# Patient Record
Sex: FEMALE | Race: WHITE | Employment: FULL TIME | URBAN - METROPOLITAN AREA
[De-identification: names, ages, dates, MRNs, and addresses within clinical notes are randomized per-mention and may not be internally consistent; named-entity substitution may affect disease eponyms.]

---

## 2017-01-04 DIAGNOSIS — G43.109 MIGRAINE WITH AURA AND WITHOUT STATUS MIGRAINOSUS, NOT INTRACTABLE: Primary | ICD-10-CM

## 2017-01-04 RX ORDER — SUMATRIPTAN 50 MG/1
50 TABLET, FILM COATED ORAL
Qty: 18 TABLET | Refills: 1 | Status: SHIPPED | OUTPATIENT
Start: 2017-01-04 | End: 2018-10-29

## 2017-01-04 NOTE — TELEPHONE ENCOUNTER
Received refill request for patients sumatriptan. Last seen in clinic 7/2016 with Dr. Colon and plan was to continue this med for migraines. Refill sent per  RN protocol.

## 2017-01-25 ENCOUNTER — RADIANT APPOINTMENT (OUTPATIENT)
Dept: MAMMOGRAPHY | Facility: CLINIC | Age: 47
End: 2017-01-25
Attending: OBSTETRICS & GYNECOLOGY

## 2017-01-25 DIAGNOSIS — Z12.39 BREAST CANCER SCREENING: ICD-10-CM

## 2017-05-24 DIAGNOSIS — I10 ESSENTIAL HYPERTENSION, BENIGN: ICD-10-CM

## 2017-05-25 RX ORDER — ENALAPRIL MALEATE 20 MG/1
TABLET ORAL
Qty: 30 TABLET | Refills: 2 | Status: SHIPPED | OUTPATIENT
Start: 2017-05-25 | End: 2017-08-25

## 2017-05-25 NOTE — TELEPHONE ENCOUNTER
Received refill for enalapril (VASOTEC) 20 MG tablet. Able to temporarily fill per protocol but patient needs to be seen for more refills. Note sent to pharmacy.

## 2017-08-23 DIAGNOSIS — I10 ESSENTIAL HYPERTENSION, BENIGN: ICD-10-CM

## 2017-08-23 NOTE — TELEPHONE ENCOUNTER
Received refill request for enalapril. Patient has clinic appointment in two days on 8/25.     Tried to reach Adriana but received voicemail.  Left message to call back and advise if she has enough medicine to wait until her appointment or if she needs a refill now.

## 2017-08-24 RX ORDER — ENALAPRIL MALEATE 20 MG/1
TABLET ORAL
Qty: 30 TABLET | Refills: 1 | OUTPATIENT
Start: 2017-08-24

## 2017-08-25 ENCOUNTER — OFFICE VISIT (OUTPATIENT)
Dept: INTERNAL MEDICINE | Facility: CLINIC | Age: 47
End: 2017-08-25
Attending: INTERNAL MEDICINE
Payer: COMMERCIAL

## 2017-08-25 VITALS
SYSTOLIC BLOOD PRESSURE: 106 MMHG | WEIGHT: 232.7 LBS | BODY MASS INDEX: 36.45 KG/M2 | DIASTOLIC BLOOD PRESSURE: 74 MMHG | HEART RATE: 78 BPM

## 2017-08-25 DIAGNOSIS — N39.41 URGE INCONTINENCE OF URINE: ICD-10-CM

## 2017-08-25 DIAGNOSIS — G43.109 MIGRAINE WITH AURA AND WITHOUT STATUS MIGRAINOSUS, NOT INTRACTABLE: ICD-10-CM

## 2017-08-25 DIAGNOSIS — I10 ESSENTIAL HYPERTENSION, BENIGN: ICD-10-CM

## 2017-08-25 DIAGNOSIS — Z00.00 ROUTINE GENERAL MEDICAL EXAMINATION AT A HEALTH CARE FACILITY: Primary | ICD-10-CM

## 2017-08-25 LAB
ANION GAP SERPL CALCULATED.3IONS-SCNC: 9 MMOL/L (ref 3–14)
BUN SERPL-MCNC: 14 MG/DL (ref 7–30)
CALCIUM SERPL-MCNC: 8.6 MG/DL (ref 8.5–10.1)
CHLORIDE SERPL-SCNC: 109 MMOL/L (ref 94–109)
CHOLEST SERPL-MCNC: 292 MG/DL
CO2 SERPL-SCNC: 26 MMOL/L (ref 20–32)
CREAT SERPL-MCNC: 0.69 MG/DL (ref 0.52–1.04)
GFR SERPL CREATININE-BSD FRML MDRD: >90 ML/MIN/1.7M2
GLUCOSE SERPL-MCNC: 93 MG/DL (ref 70–99)
HDLC SERPL-MCNC: 57 MG/DL
LDLC SERPL CALC-MCNC: 212 MG/DL
NONHDLC SERPL-MCNC: 235 MG/DL
POTASSIUM SERPL-SCNC: 4.1 MMOL/L (ref 3.4–5.3)
SODIUM SERPL-SCNC: 144 MMOL/L (ref 133–144)
TRIGL SERPL-MCNC: 116 MG/DL
TSH SERPL DL<=0.005 MIU/L-ACNC: 2.04 MU/L (ref 0.4–4)

## 2017-08-25 PROCEDURE — 99213 OFFICE O/P EST LOW 20 MIN: CPT | Mod: ZF

## 2017-08-25 PROCEDURE — 84443 ASSAY THYROID STIM HORMONE: CPT | Performed by: INTERNAL MEDICINE

## 2017-08-25 PROCEDURE — 36415 COLL VENOUS BLD VENIPUNCTURE: CPT | Performed by: INTERNAL MEDICINE

## 2017-08-25 PROCEDURE — 80061 LIPID PANEL: CPT | Performed by: INTERNAL MEDICINE

## 2017-08-25 PROCEDURE — 80048 BASIC METABOLIC PNL TOTAL CA: CPT | Performed by: INTERNAL MEDICINE

## 2017-08-25 RX ORDER — ENALAPRIL MALEATE 20 MG/1
TABLET ORAL
Qty: 90 TABLET | Refills: 3 | Status: SHIPPED | OUTPATIENT
Start: 2017-08-25 | End: 2018-08-10

## 2017-08-25 ASSESSMENT — ANXIETY QUESTIONNAIRES
2. NOT BEING ABLE TO STOP OR CONTROL WORRYING: NOT AT ALL
1. FEELING NERVOUS, ANXIOUS, OR ON EDGE: NOT AT ALL
5. BEING SO RESTLESS THAT IT IS HARD TO SIT STILL: NOT AT ALL
GAD7 TOTAL SCORE: 1
6. BECOMING EASILY ANNOYED OR IRRITABLE: NOT AT ALL
7. FEELING AFRAID AS IF SOMETHING AWFUL MIGHT HAPPEN: SEVERAL DAYS
3. WORRYING TOO MUCH ABOUT DIFFERENT THINGS: NOT AT ALL

## 2017-08-25 ASSESSMENT — ENCOUNTER SYMPTOMS
DIZZINESS: 0
DIARRHEA: 0
POLYPHAGIA: 0
HEARTBURN: 0
NUMBNESS: 0
HALLUCINATIONS: 0
NAUSEA: 0
ALTERED TEMPERATURE REGULATION: 0
JAUNDICE: 0
TINGLING: 0
DECREASED APPETITE: 0
CONSTIPATION: 1
FATIGUE: 1
BLOOD IN STOOL: 0
POLYDIPSIA: 0
MEMORY LOSS: 0
WEIGHT LOSS: 0
CHILLS: 0
HEMATURIA: 0
RECTAL BLEEDING: 0
INCREASED ENERGY: 1
FEVER: 0
VOMITING: 0
WEAKNESS: 0
FLANK PAIN: 0
SEIZURES: 0
DISTURBANCES IN COORDINATION: 0
DYSURIA: 0
TREMORS: 0
HEADACHES: 1
DIFFICULTY URINATING: 0
WEIGHT GAIN: 1
BLOATING: 1
PARALYSIS: 0
SPEECH CHANGE: 0
ABDOMINAL PAIN: 0
RECTAL PAIN: 0
NIGHT SWEATS: 0
BOWEL INCONTINENCE: 0
LOSS OF CONSCIOUSNESS: 0

## 2017-08-25 ASSESSMENT — PATIENT HEALTH QUESTIONNAIRE - PHQ9
SUM OF ALL RESPONSES TO PHQ QUESTIONS 1-9: 2
5. POOR APPETITE OR OVEREATING: NOT AT ALL

## 2017-08-25 NOTE — LETTER
9/26/2017         Adriana Lucia   [address] [facility] [place] [address]        Dear Ms. Lucia:    Your cholesterol was significantly elevated. Recommend starting a medication. Please, schedule a visit to discuss treatment options.     Results for orders placed or performed in visit on 08/25/17   Basic Metabolic Panel   Result Value Ref Range    Sodium 144 133 - 144 mmol/L    Potassium 4.1 3.4 - 5.3 mmol/L    Chloride 109 94 - 109 mmol/L    Carbon Dioxide 26 20 - 32 mmol/L    Anion Gap 9 3 - 14 mmol/L    Glucose 93 70 - 99 mg/dL    Urea Nitrogen 14 7 - 30 mg/dL    Creatinine 0.69 0.52 - 1.04 mg/dL    GFR Estimate >90 >60 mL/min/1.7m2    GFR Estimate If Black >90 >60 mL/min/1.7m2    Calcium 8.6 8.5 - 10.1 mg/dL   Lipid Profile   Result Value Ref Range    Cholesterol 292 (H) <200 mg/dL    Triglycerides 116 <150 mg/dL    HDL Cholesterol 57 >49 mg/dL    LDL Cholesterol Calculated 212 (H) <100 mg/dL    Non HDL Cholesterol 235 (H) <130 mg/dL   TSH with free T4 reflex   Result Value Ref Range    TSH 2.04 0.40 - 4.00 mU/L         Please note that test explanations are brief and do not reflect all diagnostic uses.  If you have any questions or concerns, please call the clinic at [phone].      Sincerely,      Crissy Dozier sent on behalf of  Nadira Colon MD

## 2017-08-25 NOTE — LETTER
8/25/2017        RE: Adriana Lucia  [address] [facility] [place] [address]     Dear Colleague,    Thank you for referring your patient, Adirana Lucia, to the [facility]  at [facility]. Please see a copy of my visit note below.       SUBJECTIVE:   CC: Adriana Lucia is an 46 year old woman who presents for preventive health visit.     Healthy Habits:    Do you get at least three servings of calcium containing foods daily (dairy, green leafy vegetables, etc.)? yes    Amount of exercise or daily activities, outside of work: no regular exercise    Problems taking medications regularly No    Medication side effects: No    Have you had an eye exam in the past two years? yes    Do you see a dentist twice per year? no    Do you have sleep apnea, excessive snoring or daytime drowsiness?no          -------------------------------------    Today's PHQ-2 Score: PHQ-2 ( 1999 [employer]) 8/25/2017 8/8/2016   Q1: Little interest or pleasure in doing things 0 0   Q2: Feeling down, depressed or hopeless 0 0   PHQ-2 Score 0 0   Q1: Little interest or pleasure in doing things Not at all -   Q2: Feeling down, depressed or hopeless Not at all -   PHQ-2 Score 0 -       Abuse: Current or Past(Physical, Sexual or Emotional)- No  Do you feel safe in your environment - Yes  Social History   Substance Use Topics     Smoking status: Never Smoker     Smokeless tobacco: Former User     Quit date: 8/8/2014     Alcohol use 0.0 oz/week     The patient does not drink >3 drinks per day nor >7 drinks per week.    Reviewed orders with patient.  Reviewed health maintenance and updated orders accordingly - Yes  Labs reviewed in EPIC    Patient under age 50, mutual decision reflected in health maintenance.    Pertinent mammograms are reviewed under the imaging tab.  History of abnormal Pap smear: NO - age 30-65 PAP every 5 years with negative HPV co-testing  recommended    Reviewed and updated as needed this visit by clinical staffTobacco  Allergies  Meds         Reviewed and updated as needed this visit by Provider        Past Medical History:   Diagnosis Date     HSV (herpes simplex virus) infection      Hypertension       Past Surgical History:   Procedure Laterality Date      SECTION N/A 2015    Procedure:  SECTION;  Surgeon: Meredith Clark MD;  Location:  L+D     wisdom teeth             ROS:  Review of Systems     Constitutional:  Positive for weight gain, fatigue, recent stressors and increased energy. Negative for fever, chills, weight loss, decreased appetite, night sweats, height loss, post-operative complications, incisional pain, hallucinations, hyperactivity and confused.   HENT:  Negative for ear pain, hearing loss, tinnitus, nosebleeds, trouble swallowing, hoarse voice, mouth sores, sore throat, ear discharge, tooth pain, gum tenderness, taste disturbance, smell disturbance, hearing aid, bleeding gums, dry mouth, sinus pain, sinus congestion and neck mass.    Eyes:  Negative for double vision, pain, redness, eye pain, decreased vision, eye watering, eye bulging, eye dryness, flashing lights, spots, floaters, strabismus, tunnel vision, jaundice and eye irritation.   Respiratory:   Negative for cough, hemoptysis, sputum production, shortness of breath, wheezing, sleep disturbances due to breathing, snores loudly, respiratory pain, dyspnea on exertion, cough disturbing sleep and postural dyspnea.    Cardiovascular:  Negative for chest pain, dyspnea on exertion, palpitations, orthopnea, claudication, leg swelling, fingers/toes turn blue, hypertension, hypotension, syncope, history of heart murmur, chest pain on exertion, chest pain at rest, pacemaker, few scattered varicosities, leg pain, sleep disturbances due to breathing, tachycardia, light-headedness, exercise intolerance and edema.   Gastrointestinal:   Positive for constipation and bloating. Negative for heartburn, nausea, vomiting, abdominal pain, diarrhea, blood in stool, melena, rectal pain, hemorrhoids, bowel incontinence, jaundice, rectal bleeding, coffee ground emesis and change in stool.   Genitourinary:  Positive for bladder incontinence and nocturia. Negative for dysuria, urgency, hematuria, flank pain, vaginal discharge, difficulty urinating, genital sores, dyspareunia, decreased libido, voiding less frequently, arousal difficulty, abnormal vaginal bleeding, excessive menstruation, menstrual changes, hot flashes, vaginal dryness and postmenopausal bleeding.   Musculoskeletal:  Negative for myalgias, back pain, joint swelling, arthralgias, stiffness, muscle cramps, neck pain, bone pain, muscle weakness and fracture.   Skin:  Negative for nail changes, itching, poor wound healing, rash, hair changes, skin changes, acne, warts, poor wound healing, scarring, flaky skin, Raynaud's phenomenon, sensitivity to sunlight and skin thickening.   Neurological:  Positive for headaches. Negative for dizziness, tingling, tremors, speech change, seizures, loss of consciousness, weakness, light-headedness, numbness, disturbances in coordination, memory loss, difficulty walking and paralysis.   Endo/Heme:  Negative for anemia, swollen glands and bruises/bleeds easily.   Psychiatric/Behavioral:  Negative for depression, hallucinations, memory loss, decreased concentration, mood swings and panic attacks.    Breast:  Negative for breast discharge, breast mass, breast pain and nipple retraction.   Endocrine:  Negative for altered temperature regulation, polyphagia, polydipsia, unwanted hair growth and change in facial hair.        OBJECTIVE:   /74  Pulse 78  Wt 105.6 kg (232 lb 11.2 oz)  LMP 08/20/2017  Breastfeeding? No  BMI 36.45 kg/m2  EXAM:  GENERAL: healthy, alert and no distress  EYES: Eyes grossly normal to inspection  HENT: normal cephalic/atraumatic, nose  "and mouth without ulcers or lesions, oropharynx clear and oral mucous membranes moist  NECK: no asymmetry, masses, or scars  RESP: lungs clear to auscultation - no rales, rhonchi or wheezes  CV: regular rate and rhythm, normal S1 S2, no S3 or S4, no murmur, click or rub, no peripheral edema and peripheral pulses strong  ABDOMEN: soft, nontender, no hepatosplenomegaly, no masses and bowel sounds normal  MS: no gross musculoskeletal defects noted, no edema  SKIN: no suspicious lesions or rashes  NEURO: Normal strength and tone, mentation intact and speech normal  PSYCH: mentation appears normal, affect normal/bright    ASSESSMENT/PLAN:   1. Routine general medical examination at a health care facility  Patient is up to date on PAP smear. Discussed screening for hyperlipidemia and diabetes. Patient will be advised on test results accordingly.   - Basic Metabolic Panel  - Lipid Profile  - TSH with free T4 reflex    2. Migraine with aura and without status migrainosus, not intractable  Patient will be monitoring headaches at this time. Will conisder further evaluation of headaches are bocoming more frequent and more severe.     3. Essential hypertension, benign  Blood pressure is at goal. Recommend to continue with current medical therapy without changes.  - enalapril (VASOTEC) 20 MG tablet; TAKE 1 TABLET (20 MG) BY MOUTH DAILY  Dispense: 90 tablet; Refill: 3    4. Urge incontinence of urine  Patient is interested in further evaluation, referral to Urology was placed.   - UROLOGY ADULT REFERRAL    COUNSELING:   Reviewed preventive health counseling, as reflected in patient instructions       Regular exercise       Healthy diet/nutrition       Vision screening   reports that she has never smoked. She quit smokeless tobacco use about 3 years ago.    Estimated body mass index is 36.45 kg/(m^2) as calculated from the following:    Height as of 8/8/16: 1.702 m (5' 7\").    Weight as of this encounter: 105.6 kg (232 lb 11.2 " oz).     Nadira Colon MD  [facility]

## 2017-08-25 NOTE — PROGRESS NOTES
SUBJECTIVE:   CC: Adriana Luica is an 46 year old woman who presents for preventive health visit.     Healthy Habits:    Do you get at least three servings of calcium containing foods daily (dairy, green leafy vegetables, etc.)? yes    Amount of exercise or daily activities, outside of work: no regular exercise    Problems taking medications regularly No    Medication side effects: No    Have you had an eye exam in the past two years? yes    Do you see a dentist twice per year? no    Do you have sleep apnea, excessive snoring or daytime drowsiness?no          -------------------------------------    Today's PHQ-2 Score: PHQ-2 ( 1999 Pf[employer]) 8/25/2017 8/8/2016   Q1: Little interest or pleasure in doing things 0 0   Q2: Feeling down, depressed or hopeless 0 0   PHQ-2 Score 0 0   Q1: Little interest or pleasure in doing things Not at all -   Q2: Feeling down, depressed or hopeless Not at all -   PHQ-2 Score 0 -         Abuse: Current or Past(Physical, Sexual or Emotional)- No  Do you feel safe in your environment - Yes  Social History   Substance Use Topics     Smoking status: Never Smoker     Smokeless tobacco: Former User     Quit date: 8/8/2014     Alcohol use 0.0 oz/week     The patient does not drink >3 drinks per day nor >7 drinks per week.    Reviewed orders with patient.  Reviewed health maintenance and updated orders accordingly - Yes  Labs reviewed in EPIC    Patient under age 50, mutual decision reflected in health maintenance.        Pertinent mammograms are reviewed under the imaging tab.  History of abnormal Pap smear: NO - age 30-65 PAP every 5 years with negative HPV co-testing recommended    Reviewed and updated as needed this visit by clinical staffTobacco  Allergies  Meds         Reviewed and updated as needed this visit by Provider        Past Medical History:   Diagnosis Date     HSV (herpes simplex virus) infection      Hypertension       Past Surgical History:   Procedure Laterality  Date      SECTION N/A 2015    Procedure:  SECTION;  Surgeon: Aparna Camarillo, Meredith Chisholm MD;  Location: [facility] L+[facility]     wisdom teeth      -       ROS:  Review of Systems     Constitutional:  Positive for weight gain, fatigue, recent stressors and increased energy. Negative for fever, chills, weight loss, decreased appetite, night sweats, height loss, post-operative complications, incisional pain, hallucinations, hyperactivity and confused.   HENT:  Negative for ear pain, hearing loss, tinnitus, nosebleeds, trouble swallowing, hoarse voice, mouth sores, sore throat, ear discharge, tooth pain, gum tenderness, taste disturbance, smell disturbance, hearing aid, bleeding gums, dry mouth, sinus pain, sinus congestion and neck mass.    Eyes:  Negative for double vision, pain, redness, eye pain, decreased vision, eye watering, eye bulging, eye dryness, flashing lights, spots, floaters, strabismus, tunnel vision, jaundice and eye irritation.   Respiratory:   Negative for cough, hemoptysis, sputum production, shortness of breath, wheezing, sleep disturbances due to breathing, snores loudly, respiratory pain, dyspnea on exertion, cough disturbing sleep and postural dyspnea.    Cardiovascular:  Negative for chest pain, dyspnea on exertion, palpitations, orthopnea, claudication, leg swelling, fingers/toes turn blue, hypertension, hypotension, syncope, history of heart murmur, chest pain on exertion, chest pain at rest, pacemaker, few scattered varicosities, leg pain, sleep disturbances due to breathing, tachycardia, light-headedness, exercise intolerance and edema.   Gastrointestinal:  Positive for constipation and bloating. Negative for heartburn, nausea, vomiting, abdominal pain, diarrhea, blood in stool, melena, rectal pain, hemorrhoids, bowel incontinence, jaundice, rectal bleeding, coffee ground emesis and change in stool.   Genitourinary:  Positive for bladder incontinence and nocturia. Negative for  dysuria, urgency, hematuria, flank pain, vaginal discharge, difficulty urinating, genital sores, dyspareunia, decreased libido, voiding less frequently, arousal difficulty, abnormal vaginal bleeding, excessive menstruation, menstrual changes, hot flashes, vaginal dryness and postmenopausal bleeding.   Musculoskeletal:  Negative for myalgias, back pain, joint swelling, arthralgias, stiffness, muscle cramps, neck pain, bone pain, muscle weakness and fracture.   Skin:  Negative for nail changes, itching, poor wound healing, rash, hair changes, skin changes, acne, warts, poor wound healing, scarring, flaky skin, Raynaud's phenomenon, sensitivity to sunlight and skin thickening.   Neurological:  Positive for headaches. Negative for dizziness, tingling, tremors, speech change, seizures, loss of consciousness, weakness, light-headedness, numbness, disturbances in coordination, memory loss, difficulty walking and paralysis.   Endo/Heme:  Negative for anemia, swollen glands and bruises/bleeds easily.   Psychiatric/Behavioral:  Negative for depression, hallucinations, memory loss, decreased concentration, mood swings and panic attacks.    Breast:  Negative for breast discharge, breast mass, breast pain and nipple retraction.   Endocrine:  Negative for altered temperature regulation, polyphagia, polydipsia, unwanted hair growth and change in facial hair.        OBJECTIVE:   /74  Pulse 78  Wt 105.6 kg (232 lb 11.2 oz)  LMP 08/20/2017  Breastfeeding? No  BMI 36.45 kg/m2  EXAM:  GENERAL: healthy, alert and no distress  EYES: Eyes grossly normal to inspection  HENT: normal cephalic/atraumatic, nose and mouth without ulcers or lesions, oropharynx clear and oral mucous membranes moist  NECK: no asymmetry, masses, or scars  RESP: lungs clear to auscultation - no rales, rhonchi or wheezes  CV: regular rate and rhythm, normal S1 S2, no S3 or S4, no murmur, click or rub, no peripheral edema and peripheral pulses  "strong  ABDOMEN: soft, nontender, no hepatosplenomegaly, no masses and bowel sounds normal  MS: no gross musculoskeletal defects noted, no edema  SKIN: no suspicious lesions or rashes  NEURO: Normal strength and tone, mentation intact and speech normal  PSYCH: mentation appears normal, affect normal/bright    ASSESSMENT/PLAN:   1. Routine general medical examination at a health care facility  Patient is up to date on PAP smear. Discussed screening for hyperlipidemia and diabetes. Patient will be advised on test results accordingly.   - Basic Metabolic Panel  - Lipid Profile  - TSH with free T4 reflex    2. Migraine with aura and without status migrainosus, not intractable  Patient will be monitoring headaches at this time. Will conisder further evaluation of headaches are bocoming more frequent and more severe.     3. Essential hypertension, benign  Blood pressure is at goal. Recommend to continue with current medical therapy without changes.  - enalapril (VASOTEC) 20 MG tablet; TAKE 1 TABLET (20 MG) BY MOUTH DAILY  Dispense: 90 tablet; Refill: 3    4. Urge incontinence of urine  Patient is interested in further evaluation, referral to Urology was placed.   - UROLOGY ADULT REFERRAL    COUNSELING:   Reviewed preventive health counseling, as reflected in patient instructions       Regular exercise       Healthy diet/nutrition       Vision screening         reports that she has never smoked. She quit smokeless tobacco use about 3 years ago.    Estimated body mass index is 36.45 kg/(m^2) as calculated from the following:    Height as of 8/8/16: 1.702 m (5' 7\").    Weight as of this encounter: 105.6 kg (232 lb 11.2 oz).         Counseling Resources:  ATP IV Guidelines  Pooled Cohorts Equation Calculator  Breast Cancer Risk Calculator  FRAX Risk Assessment  ICSI Preventive Guidelines  Dietary Guidelines for Americans, 2010  [employer] MyPlate  ASA Prophylaxis  Lung CA Screening    Nadira Colon MD  [facility]

## 2017-08-25 NOTE — MR AVS SNAPSHOT
After Visit Summary   8/25/2017    Adriana Lucia    MRN: [id number]           Patient Information     Date Of Birth          1970        Visit Information        Provider Department      8/25/2017 10:20 AM Nadira Colon MD [facility]         Today's Diagnoses     Routine general medical examination at a health care facility    -  1    Migraine with aura and without status migrainosus, not intractable        Essential hypertension, benign        Urge incontinence of urine          Care Instructions      Preventive Health Recommendations  Female Ages 40 to 49    Yearly exam:     See your health care provider every year in order to  1. Review health changes.   2. Discuss preventive care.    3. Review your medicines if your doctor prescribed any.      Get a Pap test every three years (unless you have an abnormal result and your provider advises testing more often).      If you get Pap tests with HPV test, you only need to test every 5 years, unless you have an abnormal result. You do not need a Pap test if your uterus was removed (hysterectomy) and you have not had cancer.      You should be tested each year for STDs (sexually transmitted diseases), if you're at risk.       Ask your doctor if you should have a mammogram.      Have a colonoscopy (test for colon cancer) if someone in your family has had colon cancer or polyps before age 50.       Have a cholesterol test every 5 years.       Have a diabetes test (fasting glucose) after age 45. If you are at risk for diabetes, you should have this test every 3 years.    Shots: Get a flu shot each year. Get a tetanus shot every 10 years.     Nutrition:     Eat at least 5 servings of fruits and vegetables each day.    Eat whole-grain bread, whole-wheat pasta and brown rice instead of white grains and rice.    Talk to your provider about Calcium and Vitamin D.     Lifestyle    Exercise at least 150 minutes a week (an  average of 30 minutes a day, 5 days a week). This will help you control your weight and prevent disease.    Limit alcohol to one drink per day.    No smoking.     Wear sunscreen to prevent skin cancer.    See your dentist every six months for an exam and cleaning.          Follow-ups after your visit        Additional Services     UROLOGY ADULT REFERRAL       Your provider has referred you to: [facility]: [place] [facility] [place] [phone]   [email or web address]    Please be aware that coverage of these services is subject to the terms and limitations of your health insurance plan.  Call member services at your health plan with any benefit or coverage questions.      Please bring the following with you to your appointment:    (1) Any X-Rays, CTs or MRIs which have been performed.  Contact the facility where they were done to arrange for  prior to your scheduled appointment.    (2) List of current medications  (3) This referral request   (4) Any documents/labs given to you for this referral                  Who to contact     Please call your clinic at [phone] to:    Ask questions about your health    Make or cancel appointments    Discuss your medicines    Learn about your test results    Speak to your doctor   If you have compliments or concerns about an experience at your clinic, or if you wish to file a complaint, please contact [facility] Patient Relations at [phone] or email us at [email or web address]         Additional Information About Your Visit        Remotemedicalhart Information     [employer] gives you secure access to your electronic health record. If you see a primary care provider, you can also send messages to your care team and make appointments. If you have questions, please call your primary care clinic.  If you do not have a primary care provider, please call  [phone] and they will assist you.      [employer] is an electronic gateway that provides easy, online access to your medical records. With [employer], you can request a clinic appointment, read your test results, renew a prescription or communicate with your care team.     To access your existing account, please contact your [facility] or call [phone] for assistance.        Care EveryWhere ID     This is your Care EveryWhere ID. This could be used by other organizations to access your [facility] medical records  [id number]        Your Vitals Were     Pulse Last Period Breastfeeding? BMI (Body Mass Index)          78 08/20/2017 No 36.45 kg/m2         Blood Pressure from Last 3 Encounters:   08/25/17 106/74   08/08/16 124/84   07/11/16 103/70    Weight from Last 3 Encounters:   08/25/17 105.6 kg (232 lb 11.2 oz)   08/08/16 99.5 kg (219 lb 4.8 oz)   07/11/16 98.9 kg (218 lb)              We Performed the Following     Basic Metabolic Panel     Lipid Profile     TSH with free T4 reflex     UROLOGY ADULT REFERRAL          Today's Medication Changes          These changes are accurate as of: 8/25/17 11:22 AM.  If you have any questions, ask your nurse or doctor.               These medicines have changed or have updated prescriptions.        Dose/Directions    enalapril 20 MG tablet   Commonly known as:  VASOTEC   This may have changed:  See the new instructions.   Used for:  Essential hypertension, benign   Changed by:  Nadira Colon MD        TAKE 1 TABLET (20 MG) BY MOUTH DAILY   Quantity:  90 tablet   Refills:  3            Where to get your medicines      These medications were sent to [id number] [place] [id number] [address]     Phone:  [phone]     enalapril 20 MG tablet                Primary Care Provider    Physician No Ref-Primary       No address on file        Equal Access to Services     NIA MOTA AH:  Hadii aad ku hadsherriernesto Sorebecaali, wakarisda luqadaha, qaybta kaallizette pacheco, beverly jorgeramon martínezmarci golden. So [facility] [phone].    ATENCIÓN: Si giuseppe rosenbaum, tiene a bolanos disposición servicios gratuitos de asistencia lingüística. Llame al [phone].    We comply with applicable federal civil rights laws and [place] laws. We do not discriminate on the basis of race, color, national origin, age, disability sex, sexual orientation or gender identity.            Thank you!     Thank you for choosing [facility]   for your care. Our goal is always to provide you with excellent care. Hearing back from our patients is one way we can continue to improve our services. Please take a few minutes to complete the written survey that you may receive in the mail after your visit with us. Thank you!             Your Updated Medication List - Protect others around you: Learn how to safely use, store and throw away your medicines at www.disposemymeds.org.          This list is accurate as of: 8/25/17 11:22 AM.  Always use your most recent med list.                   Brand Name Dispense Instructions for use Diagnosis    CALCIUM PO           enalapril 20 MG tablet    VASOTEC    90 tablet    TAKE 1 TABLET (20 MG) BY MOUTH DAILY    Essential hypertension, benign       SUMAtriptan 50 MG tablet    IMITREX    18 tablet    Take 1 tablet (50 mg) by mouth at onset of headache for migraine May repeat dose in 2 hours.  Do not exceed 200 mg in 24 hours    Migraine with aura and without status migrainosus, not intractable       VITAMIN D (CHOLECALCIFEROL) PO      Take 5,000 Units by mouth every other day

## 2017-08-26 ASSESSMENT — ANXIETY QUESTIONNAIRES: GAD7 TOTAL SCORE: 1

## 2017-08-27 ASSESSMENT — ENCOUNTER SYMPTOMS
BOWEL INCONTINENCE: 0
MYALGIAS: 0
NERVOUS/ANXIOUS: 0
FLANK PAIN: 0
LEG SWELLING: 0
WEIGHT GAIN: 1
FEVER: 0
WHEEZING: 0
EYE PAIN: 0
EYE WATERING: 0
BREAST PAIN: 0
DEPRESSION: 0
HEMATURIA: 0
SINUS CONGESTION: 0
COUGH DISTURBING SLEEP: 0
DECREASED CONCENTRATION: 0
MUSCLE WEAKNESS: 0
SPEECH CHANGE: 0
NAUSEA: 0
SNORES LOUDLY: 0
SPUTUM PRODUCTION: 0
JOINT SWELLING: 0
TINGLING: 0
SLEEP DISTURBANCES DUE TO BREATHING: 0
LOSS OF CONSCIOUSNESS: 0
TROUBLE SWALLOWING: 0
TASTE DISTURBANCE: 0
DIFFICULTY URINATING: 0
SMELL DISTURBANCE: 0
CLAUDICATION: 0
POSTURAL DYSPNEA: 0
STIFFNESS: 0
EYE IRRITATION: 0
PALPITATIONS: 0
LIGHT-HEADEDNESS: 0
DECREASED APPETITE: 0
RECTAL BLEEDING: 0
CONSTIPATION: 1
SEIZURES: 0
INSOMNIA: 0
BRUISES/BLEEDS EASILY: 0
BLOATING: 1
HEARTBURN: 0
POOR WOUND HEALING: 0
SWOLLEN GLANDS: 0
TREMORS: 0
HYPERTENSION: 0
JAUNDICE: 0
HOARSE VOICE: 0
NECK MASS: 0
INCREASED ENERGY: 1
NAIL CHANGES: 0
HALLUCINATIONS: 0
DISTURBANCES IN COORDINATION: 0
DIARRHEA: 0
ALTERED TEMPERATURE REGULATION: 0
NIGHT SWEATS: 0
ARTHRALGIAS: 0
PARALYSIS: 0
SYNCOPE: 0
HEMOPTYSIS: 0
CHILLS: 0
POLYDIPSIA: 0
RESPIRATORY PAIN: 0
PANIC: 0
NECK PAIN: 0
HYPOTENSION: 0
DYSPNEA ON EXERTION: 0
EYE REDNESS: 0
TACHYCARDIA: 0
VOMITING: 0
WEAKNESS: 0
SINUS PAIN: 0
BACK PAIN: 0
LEG PAIN: 0
NUMBNESS: 0
POLYPHAGIA: 0
FATIGUE: 1
DOUBLE VISION: 0
EXERCISE INTOLERANCE: 0
ORTHOPNEA: 0
MEMORY LOSS: 0
DYSURIA: 0
COUGH: 0
MUSCLE CRAMPS: 0
HEADACHES: 1
WEIGHT LOSS: 0
BLOOD IN STOOL: 0
ABDOMINAL PAIN: 0
SKIN CHANGES: 0
BREAST MASS: 0
HOT FLASHES: 0
RECTAL PAIN: 0
DECREASED LIBIDO: 0
SORE THROAT: 0
SHORTNESS OF BREATH: 0
DIZZINESS: 0

## 2017-10-11 ENCOUNTER — OFFICE VISIT (OUTPATIENT)
Dept: INTERNAL MEDICINE | Facility: CLINIC | Age: 47
End: 2017-10-11
Attending: INTERNAL MEDICINE
Payer: COMMERCIAL

## 2017-10-11 ENCOUNTER — MYC MEDICAL ADVICE (OUTPATIENT)
Dept: INTERNAL MEDICINE | Facility: CLINIC | Age: 47
End: 2017-10-11

## 2017-10-11 VITALS
HEART RATE: 98 BPM | BODY MASS INDEX: 36.34 KG/M2 | SYSTOLIC BLOOD PRESSURE: 107 MMHG | WEIGHT: 232 LBS | DIASTOLIC BLOOD PRESSURE: 75 MMHG

## 2017-10-11 DIAGNOSIS — E78.5 HYPERLIPIDEMIA LDL GOAL <100: Primary | ICD-10-CM

## 2017-10-11 DIAGNOSIS — E78.5 HYPERLIPIDEMIA LDL GOAL <100: ICD-10-CM

## 2017-10-11 PROCEDURE — 99212 OFFICE O/P EST SF 10 MIN: CPT | Mod: ZF

## 2017-10-11 RX ORDER — ATORVASTATIN CALCIUM 10 MG/1
10 TABLET, FILM COATED ORAL DAILY
Qty: 30 TABLET | Refills: 1 | Status: SHIPPED | OUTPATIENT
Start: 2017-10-11 | End: 2017-10-11

## 2017-10-11 RX ORDER — ATORVASTATIN CALCIUM 10 MG/1
10 TABLET, FILM COATED ORAL DAILY
Qty: 30 TABLET | Refills: 0 | Status: SHIPPED | OUTPATIENT
Start: 2017-10-11 | End: 2017-12-08

## 2017-10-11 RX ORDER — ATORVASTATIN CALCIUM 10 MG/1
5 TABLET, FILM COATED ORAL DAILY
Qty: 15 TABLET | Refills: 0 | Status: SHIPPED | OUTPATIENT
Start: 2017-10-11 | End: 2017-12-06

## 2017-10-11 NOTE — LETTER
10/11/2017       RE: Adriana Lucia  [address] [facility] [place] [address]     Dear Colleague,    Thank you for referring your patient, Adriana Lucia, to the [facility]  at [facility]. Please see a copy of my visit note below.    HPI  Patient is here for follow up in recent blood tests. She states that several of her relatives have elevated cholesterol and take medications. She is wondering if she needs to start the medication.     Review of Systems     Constitutional:  Negative for fever, chills and fatigue.   Respiratory:   Negative for cough and dyspnea on exertion.    Cardiovascular:  Negative for chest pain, dyspnea on exertion and claudication.   Gastrointestinal:  Negative for abdominal pain, diarrhea, constipation and change in stool.   Neurological:  Negative for dizziness and disturbances in coordination.   Psychiatric/Behavioral:  Negative for depression, decreased concentration, mood swings and panic attacks.    Endocrine:  Negative for altered temperature regulation, polyphagia, polydipsia, unwanted hair growth and change in facial hair.    Current Outpatient Prescriptions   Medication     atorvastatin (LIPITOR) 10 MG tablet     atorvastatin (LIPITOR) 10 MG tablet     enalapril (VASOTEC) 20 MG tablet     SUMAtriptan (IMITREX) 50 MG tablet     CALCIUM PO     VITAMIN D, CHOLECALCIFEROL, PO     No current facility-administered medications for this visit.      Facility-Administered Medications Ordered in Other Visits   Medication     bupivacaine 0.25 % - EPINEPHrine 1:200,000 injection     Vitals:    10/11/17 1029 10/11/17 1030 10/11/17 1031   BP: 117/79 115/80 107/75   Pulse: 92 92 98   Weight: 105.2 kg (232 lb)           Physical Exam   Constitutional: She is well-developed, well-nourished, and in no distress.   HENT:   Head: Normocephalic and atraumatic.   Eyes: Conjunctivae are normal.   Neck: Normal range of motion. Neck  supple.   Skin: Skin is warm and dry.   Psychiatric: Mood, memory and affect normal.   Vitals reviewed.    Assessment and Plan:  Adriana was seen today for recheck.    Diagnoses and all orders for this visit:    Hyperlipidemia LDL goal <100. Discussed lipid levels with patient. Advised on options for management of hyperlipidemia. Patient was advised on statin therapy benefits and side effects. Will start atorvastatin at low dose and increase the dose to 40 mg daily over time. She is in agreement with the plan.   -     atorvastatin (LIPITOR) 10 MG tablet; Take 1 tablet (10 mg) by mouth daily      Total time spent 15 minutes.  More than 50% of the time spent with Ms. Lucia on counseling / coordinating her care    Nadira Colon MD

## 2017-10-11 NOTE — MR AVS SNAPSHOT
After Visit Summary   10/11/2017    Adriana Lucia    MRN: [id number]           Patient Information     Date Of Birth          1970        Visit Information        Provider Department      10/11/2017 10:20 AM Nadira Colon MD [facility]         Today's Diagnoses     Hyperlipidemia LDL goal <100    -  1       Follow-ups after your visit        Who to contact     Please call your clinic at [phone] to:    Ask questions about your health    Make or cancel appointments    Discuss your medicines    Learn about your test results    Speak to your doctor   If you have compliments or concerns about an experience at your clinic, or if you wish to file a complaint, please contact [facility] Patient Relations at [phone] or email us at [email or web address]         Additional Information About Your Visit        MyChart Information     [employer] gives you secure access to your electronic health record. If you see a primary care provider, you can also send messages to your care team and make appointments. If you have questions, please call your primary care clinic.  If you do not have a primary care provider, please call [phone] and they will assist you.      [employer] is an electronic gateway that provides easy, online access to your medical records. With [employer], you can request a clinic appointment, read your test results, renew a prescription or communicate with your care team.     To access your existing account, please contact your [facility] or call [phone] for assistance.        Care EveryWhere ID     This is your Care EveryWhere ID. This could be used by other organizations to access your [facility] medical records  [id number]        Your Vitals Were     Pulse Last Period Breastfeeding? BMI (Body Mass Index)          98 09/17/2017 No 36.34 kg/m2         Blood Pressure from Last 3  Encounters:   10/11/17 107/75   08/25/17 106/74   08/08/16 124/84    Weight from Last 3 Encounters:   10/11/17 105.2 kg (232 lb)   08/25/17 105.6 kg (232 lb 11.2 oz)   08/08/16 99.5 kg (219 lb 4.8 oz)              Today, you had the following     No orders found for display         Today's Medication Changes          These changes are accurate as of: 10/11/17 11:59 PM.  If you have any questions, ask your nurse or doctor.               Start taking these medicines.        Dose/Directions    * atorvastatin 10 MG tablet   Commonly known as:  LIPITOR   Used for:  Hyperlipidemia LDL goal <100   Started by:  Nadira Colon MD        Dose:  5 mg   Take 0.5 tablets (5 mg) by mouth daily   Quantity:  15 tablet   Refills:  0       * atorvastatin 10 MG tablet   Commonly known as:  LIPITOR   Used for:  Hyperlipidemia LDL goal <100   Started by:  Nadira Colon MD        Dose:  10 mg   Take 1 tablet (10 mg) by mouth daily   Quantity:  30 tablet   Refills:  0       * Notice:  This list has 2 medication(s) that are the same as other medications prescribed for you. Read the directions carefully, and ask your doctor or other care provider to review them with you.         Where to get your medicines      These medications were sent to [id number] [address]     Phone:  [phone]     atorvastatin 10 MG tablet    atorvastatin 10 MG tablet                Primary Care Provider    Physician No Ref-Primary       NO REF-PRIMARY PHYSICIAN        Equal Access to Services     NIA MOTA AH: Rakan Ndiaye, waaxda luqadaha, qaybta kaalmada adeegyada, waxay yovani franks. So [facility] [phone].    ATENCIÓN: Si habla español, tiene a bolanos disposición servicios gratuitos de asistencia lingüística. Llame al [phone].    We comply with applicable federal civil rights laws and [place] laws. We do not discriminate on  the basis of race, color, national origin, age, disability, sex, sexual orientation, or gender identity.            Thank you!     Thank you for choosing [facility]   for your care. Our goal is always to provide you with excellent care. Hearing back from our patients is one way we can continue to improve our services. Please take a few minutes to complete the written survey that you may receive in the mail after your visit with us. Thank you!             Your Updated Medication List - Protect others around you: Learn how to safely use, store and throw away your medicines at www.disposemymeds.org.          This list is accurate as of: 10/11/17 11:59 PM.  Always use your most recent med list.                   Brand Name Dispense Instructions for use Diagnosis    * atorvastatin 10 MG tablet    LIPITOR    15 tablet    Take 0.5 tablets (5 mg) by mouth daily    Hyperlipidemia LDL goal <100       * atorvastatin 10 MG tablet    LIPITOR    30 tablet    Take 1 tablet (10 mg) by mouth daily    Hyperlipidemia LDL goal <100       CALCIUM PO           enalapril 20 MG tablet    VASOTEC    90 tablet    TAKE 1 TABLET (20 MG) BY MOUTH DAILY    Essential hypertension, benign       SUMAtriptan 50 MG tablet    IMITREX    18 tablet    Take 1 tablet (50 mg) by mouth at onset of headache for migraine May repeat dose in 2 hours.  Do not exceed 200 mg in 24 hours    Migraine with aura and without status migrainosus, not intractable       VITAMIN D (CHOLECALCIFEROL) PO      Take 5,000 Units by mouth every other day        * Notice:  This list has 2 medication(s) that are the same as other medications prescribed for you. Read the directions carefully, and ask your doctor or other care provider to review them with you.

## 2017-10-11 NOTE — TELEPHONE ENCOUNTER
Pt requested order be rewritten and sent to pharmacy to prevent trouble refilling after dose increase in one month

## 2017-10-15 ASSESSMENT — ENCOUNTER SYMPTOMS
INSOMNIA: 0
ALTERED TEMPERATURE REGULATION: 0
NERVOUS/ANXIOUS: 0
DYSPNEA ON EXERTION: 0
COUGH: 0
FEVER: 0
FATIGUE: 0
DISTURBANCES IN COORDINATION: 0
CONSTIPATION: 0
DIZZINESS: 0
DECREASED CONCENTRATION: 0
PANIC: 0
CHILLS: 0
DEPRESSION: 0
DIARRHEA: 0
ABDOMINAL PAIN: 0
POLYPHAGIA: 0
CLAUDICATION: 0
POLYDIPSIA: 0

## 2017-10-16 ASSESSMENT — ANXIETY QUESTIONNAIRES
2. NOT BEING ABLE TO STOP OR CONTROL WORRYING: NOT AT ALL
3. WORRYING TOO MUCH ABOUT DIFFERENT THINGS: NOT AT ALL
GAD7 TOTAL SCORE: 0
7. FEELING AFRAID AS IF SOMETHING AWFUL MIGHT HAPPEN: NOT AT ALL
5. BEING SO RESTLESS THAT IT IS HARD TO SIT STILL: NOT AT ALL
1. FEELING NERVOUS, ANXIOUS, OR ON EDGE: NOT AT ALL
6. BECOMING EASILY ANNOYED OR IRRITABLE: NOT AT ALL

## 2017-10-16 ASSESSMENT — PATIENT HEALTH QUESTIONNAIRE - PHQ9
5. POOR APPETITE OR OVEREATING: NOT AT ALL
SUM OF ALL RESPONSES TO PHQ QUESTIONS 1-9: 2

## 2017-10-16 NOTE — PROGRESS NOTES
HPI  Patient is here for follow up in recent blood tests. She states that several of her relatives have elevated cholesterol and take medications. She is wondering if she needs to start the medication.     Review of Systems     Constitutional:  Negative for fever, chills and fatigue.   Respiratory:   Negative for cough and dyspnea on exertion.    Cardiovascular:  Negative for chest pain, dyspnea on exertion and claudication.   Gastrointestinal:  Negative for abdominal pain, diarrhea, constipation and change in stool.   Neurological:  Negative for dizziness and disturbances in coordination.   Psychiatric/Behavioral:  Negative for depression, decreased concentration, mood swings and panic attacks.    Endocrine:  Negative for altered temperature regulation, polyphagia, polydipsia, unwanted hair growth and change in facial hair.    Current Outpatient Prescriptions   Medication     atorvastatin (LIPITOR) 10 MG tablet     atorvastatin (LIPITOR) 10 MG tablet     enalapril (VASOTEC) 20 MG tablet     SUMAtriptan (IMITREX) 50 MG tablet     CALCIUM PO     VITAMIN D, CHOLECALCIFEROL, PO     No current facility-administered medications for this visit.      Facility-Administered Medications Ordered in Other Visits   Medication     bupivacaine 0.25 % - EPINEPHrine 1:200,000 injection     Vitals:    10/11/17 1029 10/11/17 1030 10/11/17 1031   BP: 117/79 115/80 107/75   Pulse: 92 92 98   Weight: 105.2 kg (232 lb)           Physical Exam   Constitutional: She is well-developed, well-nourished, and in no distress.   HENT:   Head: Normocephalic and atraumatic.   Eyes: Conjunctivae are normal.   Neck: Normal range of motion. Neck supple.   Skin: Skin is warm and dry.   Psychiatric: Mood, memory and affect normal.   Vitals reviewed.    Assessment and Plan:  Adriana was seen today for recheck.    Diagnoses and all orders for this visit:    Hyperlipidemia LDL goal <100. Discussed lipid levels with patient. Advised on options for management  of hyperlipidemia. Patient was advised on statin therapy benefits and side effects. Will start atorvastatin at low dose and increase the dose to 40 mg daily over time. She is in agreement with the plan.   -     atorvastatin (LIPITOR) 10 MG tablet; Take 1 tablet (10 mg) by mouth daily      Total time spent 15 minutes.  More than 50% of the time spent with Ms. Lucia on counseling / coordinating her care    Nadira Colon MD

## 2017-10-17 ASSESSMENT — ANXIETY QUESTIONNAIRES: GAD7 TOTAL SCORE: 0

## 2017-12-06 ENCOUNTER — OFFICE VISIT (OUTPATIENT)
Dept: FAMILY MEDICINE | Facility: CLINIC | Age: 47
End: 2017-12-06
Attending: INTERNAL MEDICINE
Payer: COMMERCIAL

## 2017-12-06 VITALS
WEIGHT: 233.8 LBS | DIASTOLIC BLOOD PRESSURE: 78 MMHG | HEIGHT: 67 IN | SYSTOLIC BLOOD PRESSURE: 119 MMHG | HEART RATE: 79 BPM | BODY MASS INDEX: 36.7 KG/M2

## 2017-12-06 DIAGNOSIS — E78.5 HYPERLIPIDEMIA LDL GOAL <100: Primary | ICD-10-CM

## 2017-12-06 LAB
ALT SERPL W P-5'-P-CCNC: 20 U/L (ref 0–50)
AST SERPL W P-5'-P-CCNC: 10 U/L (ref 0–45)
CHOLEST SERPL-MCNC: 171 MG/DL
HDLC SERPL-MCNC: 46 MG/DL
LDLC SERPL CALC-MCNC: 106 MG/DL
NONHDLC SERPL-MCNC: 125 MG/DL
TRIGL SERPL-MCNC: 93 MG/DL

## 2017-12-06 PROCEDURE — 84450 TRANSFERASE (AST) (SGOT): CPT | Performed by: FAMILY MEDICINE

## 2017-12-06 PROCEDURE — 84460 ALANINE AMINO (ALT) (SGPT): CPT | Performed by: FAMILY MEDICINE

## 2017-12-06 PROCEDURE — 80061 LIPID PANEL: CPT | Performed by: FAMILY MEDICINE

## 2017-12-06 PROCEDURE — 36415 COLL VENOUS BLD VENIPUNCTURE: CPT | Performed by: FAMILY MEDICINE

## 2017-12-06 ASSESSMENT — PAIN SCALES - GENERAL: PAINLEVEL: NO PAIN (0)

## 2017-12-06 NOTE — PROGRESS NOTES
Adriana is a 47 year old female  that presents today to discuss cholesterol management:  Dr. Colon is out of clinic today.   HPI:  Started on 5 mg of atorvastatin X one month and now on 10 mg X one months and no problems. [ 8.2017]. Needs labs drawn before increasing her dose.   ROS:  General: none  Head/Eyes: none  Ears/Nose/Throat: none  Cardiovascular: none  Respiratory: none  Gastrointestinal: none  Breast: none  Genitourinary: none  Sexual Function: none  Musculoskeletal: none  Skin: none  Neurological: none  Mental Health: none  Endocrine: none  PROBLEM LIST:  Patient Active Problem List   Diagnosis     Hyperlipidemia     High-risk pregnancy, AMA     HSV (herpes simplex virus) infection     Cervical polyp     Elevated blood pressure reading without diagnosis of hypertension     Vitamin D deficiency     LGA >97th %ile at 36+6 (efw 3871 g)     Chronic hypertension     S/P  section     Chronic hypertension with superimposed preeclampsia     Cherry angioma     Skin cancer screening     Multiple benign nevi     Dermatofibroma     Skin tag   OB/GYN HISTORY:   Obstetric History       T1      L1     SAB2   TAB0   Ectopic0   Multiple0   Live Births1       # Outcome Date GA Lbr Chandu/2nd Weight Sex Delivery Anes PTL Lv   4 Term 06/07/15 38w2d 07:30 / 08:31 3.881 kg (8 lb 8.9 oz) M CS-LTranv Spinal  BRIGIDO      Apgar1:  5                Apgar5: 8   3 SAB 2013           2 SAB 2008           1 AB 1998              PAST MEDICAL HISTORY:  Past Medical History:   Diagnosis Date     HSV (herpes simplex virus) infection      Hypertension      PAST SURGICAL HISTORY:  Past Surgical History:   Procedure Laterality Date      SECTION N/A 2015    Procedure:  SECTION;  Surgeon: Meredith Clark MD;  Location:  L+D     wisdom teeth         FAMILY HISTORY:  Family History   Problem Relation Age of Onset     Asthma Father      Allergies Father      Lipids  Father      CEREBROVASCULAR DISEASE Father      Alcohol/Drug Paternal Grandfather      Allergies Mother      Lipids Mother      Thyroid Disease Mother      Other - See Comments Mother      DVT during preg      Lipids Maternal Grandmother      Thyroid Disease Maternal Grandfather      Obesity Maternal Grandfather      CANCER No family hx of      no skin cancer   SOCIAL HISTORY:  Social History     Social History     Marital status: Single     Spouse name: Tai Bazan     Number of children: 0     Years of education: N/A     Occupational History     Pharmacy Tech Unknown     Social History Main Topics     Smoking status: Never Smoker     Smokeless tobacco: Former User     Quit date: 8/8/2014     Alcohol use 0.0 oz/week     Drug use: No     Sexual activity: Yes     Partners: Male     Birth control/ protection: None     Other Topics Concern      Service No     Blood Transfusions No     Caffeine Concern No     2 cups a coffee per day     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 Considering biking     Bike Helmet Yes     Seat Belt Yes     Self-Exams Yes     Social History Narrative    How much exercise per week? 3-4 times weekly    How much calcium per day? Diet - yogurt daily       How much caffeine per day? 2 cups    How much vitamin D per day? 5000 IU plus diet    Do you/your family wear seatbelts?  Yes    Do you/your family use safety helmets? n/a    Do you/your family use sunscreen? Yes    Do you/your family keep firearms in the home? Yes    Do you/your family have a smoke detector(s)? Yes        Do you feel safe in your home? Yes    Has anyone ever touched you in an unwanted manner? No    Margoth Coronado LPN    6/20/14           MEDICATIONS:  Current Outpatient Prescriptions   Medication Sig  "Dispense Refill     atorvastatin (LIPITOR) 10 MG tablet Take 1 tablet (10 mg) by mouth daily 30 tablet 0     enalapril (VASOTEC) 20 MG tablet TAKE 1 TABLET (20 MG) BY MOUTH DAILY 90 tablet 3     SUMAtriptan (IMITREX) 50 MG tablet Take 1 tablet (50 mg) by mouth at onset of headache for migraine May repeat dose in 2 hours.  Do not exceed 200 mg in 24 hours 18 tablet 1     CALCIUM PO        VITAMIN D, CHOLECALCIFEROL, PO Take 5,000 Units by mouth every other day       [DISCONTINUED] atorvastatin (LIPITOR) 10 MG tablet Take 0.5 tablets (5 mg) by mouth daily 15 tablet 0   ALLERGIES:  Review of patient's allergies indicates no known allergies.  VITALS:  Blood pressure 119/78, pulse 79, height 1.702 m (5' 7\"), weight 106.1 kg (233 lb 12.8 oz), not currently breastfeeding.  PHYSICAL EXAM:  Constitutional: Well appearing woman in no acute distress.   Psychological: appropriate mood.  Eyes: anicteric, normal extra-ocular movements,   Neurological: normal gait, no tremor.   Diagnoses and associated orders for this visit:  Hyperlipidemia LDL goal <100  -     Lipid Panel  -     ALT  -     AST  Offered to increase her atorvastatin dose, however she will wait for lab results and then follow-up with Dr. Colon's on medication refill and dosage.   "

## 2017-12-06 NOTE — MR AVS SNAPSHOT
"              After Visit Summary   12/6/2017    Adriana Lucia    MRN: [id number]           Patient Information     Date Of Birth          1970        Visit Information        Provider Department      12/6/2017 9:00 AM Jolly Pruitt MD [facility]        Today's Diagnoses     Hyperlipidemia LDL goal <100    -  1       Follow-ups after your visit        Who to contact     Please call your clinic at [phone] to:    Ask questions about your health    Make or cancel appointments    Discuss your medicines    Learn about your test results    Speak to your doctor   If you have compliments or concerns about an experience at your clinic, or if you wish to file a complaint, please contact [facility] Patient Relations at [phone] or email us at [email or web address]         Additional Information About Your Visit        MyChart Information     [employer] gives you secure access to your electronic health record. If you see a primary care provider, you can also send messages to your care team and make appointments. If you have questions, please call your primary care clinic.  If you do not have a primary care provider, please call [phone] and they will assist you.      [facility] is an electronic gateway that provides easy, online access to your medical records. With [facility], you can request a clinic appointment, read your test results, renew a prescription or communicate with your care team.     To access your existing account, please contact your [facility] or call [phone] for assistance.        Care EveryWhere ID     This is your Care EveryWhere ID. This could be used by other organizations to access your [place] medical records  [id number]        Your Vitals Were     Pulse Height BMI (Body Mass Index)             79 1.702 m (5' 7\") 36.62 kg/m2          Blood Pressure from Last 3 Encounters:   12/06/17 " 119/78   10/11/17 107/75   08/25/17 106/74    Weight from Last 3 Encounters:   12/06/17 106.1 kg (233 lb 12.8 oz)   10/11/17 105.2 kg (232 lb)   08/25/17 105.6 kg (232 lb 11.2 oz)               Primary Care Provider Fax #    Physician No Ref-Primary [phone]       No address on file        Equal Access to Services     NIA MOTA : Hadii aad ku hadasho Soomaali, waaxda luqadaha, qaybta kaalmada adeegyada, waxay yovani loisn adecarlos chacon laCarlacisco . So [facility] [phone].    ATENCIÓN: Si adinla robi, tiene a bolanos disposición servicios gratuitos de asistencia lingüística. Llame al [phone].    We comply with applicable federal civil rights laws and [place] laws. We do not discriminate on the basis of race, color, national origin, age, disability, sex, sexual orientation, or gender identity.            Thank you!     Thank you for choosing [facility]  for your care. Our goal is always to provide you with excellent care. Hearing back from our patients is one way we can continue to improve our services. Please take a few minutes to complete the written survey that you may receive in the mail after your visit with us. Thank you!             Your Updated Medication List - Protect others around you: Learn how to safely use, store and throw away your medicines at www.disposemymeds.org.          This list is accurate as of: 12/6/17  9:11 AM.  Always use your most recent med list.                   Brand Name Dispense Instructions for use Diagnosis    atorvastatin 10 MG tablet    LIPITOR    30 tablet    Take 1 tablet (10 mg) by mouth daily    Hyperlipidemia LDL goal <100       CALCIUM PO           enalapril 20 MG tablet    VASOTEC    90 tablet    TAKE 1 TABLET (20 MG) BY MOUTH DAILY    Essential hypertension, benign       SUMAtriptan 50 MG tablet    IMITREX    18 tablet    Take 1 tablet (50 mg) by mouth at onset of headache for migraine May repeat dose in 2 hours.  Do not exceed 200 mg in 24  hours    Migraine with aura and without status migrainosus, not intractable       VITAMIN D (CHOLECALCIFEROL) PO      Take 5,000 Units by mouth every other day

## 2017-12-06 NOTE — NURSING NOTE
Chief Complaint   Patient presents with     Recheck Medication     Discuss medication increase of atorvastatin.

## 2017-12-06 NOTE — LETTER
2017       RE: Adriana Lucia  [address] [facility] [address]     Dear Colleague,    Thank you for referring your patient, Adriana Lucia, to the [facility] at [facility]. Please see a copy of my visit note below.    Adriana is a 47 year old female  that presents today to discuss cholesterol management:  Dr. Colon is out of clinic today.   HPI:  Started on 5 mg of atorvastatin X one month and now on 10 mg X one months and no problems. [ 8.]. Needs labs drawn before increasing her dose.   ROS:  General: none  Head/Eyes: none  Ears/Nose/Throat: none  Cardiovascular: none  Respiratory: none  Gastrointestinal: none  Breast: none  Genitourinary: none  Sexual Function: none  Musculoskeletal: none  Skin: none  Neurological: none  Mental Health: none  Endocrine: none  PROBLEM LIST:  Patient Active Problem List   Diagnosis     Hyperlipidemia     High-risk pregnancy, AMA     HSV (herpes simplex virus) infection     Cervical polyp     Elevated blood pressure reading without diagnosis of hypertension     Vitamin D deficiency     LGA >97th %ile at 36+6 (efw 3871 g)     Chronic hypertension     S/P  section     Chronic hypertension with superimposed preeclampsia     Cherry angioma     Skin cancer screening     Multiple benign nevi     Dermatofibroma     Skin tag   OB/GYN HISTORY:   Obstetric History       T1      L1     SAB2   TAB0   Ectopic0   Multiple0   Live Births1       # Outcome Date GA Lbr Chandu/2nd Weight Sex Delivery Anes PTL Lv   4 Term 06/07/15 38w2d 07:30 / 08:31 3.881 kg (8 lb 8.9 oz) M CS-LTranv Spinal  BRIGIDO      Apgar1:  5                Apgar5: 8   3 SAB 2013           2 SAB 2008           1 AB 1998              PAST MEDICAL HISTORY:  Past Medical History:   Diagnosis Date     HSV (herpes simplex virus) infection      Hypertension      PAST SURGICAL HISTORY:  Past Surgical History:    Procedure Laterality Date      SECTION N/A 2015    Procedure:  SECTION;  Surgeon: Meredith Clark MD;  Location: [facility] L[facility]     wisdom teeth         FAMILY HISTORY:  Family History   Problem Relation Age of Onset     Asthma Father      Allergies Father      Lipids Father      CEREBROVASCULAR DISEASE Father      Alcohol/Drug Paternal Grandfather      Allergies Mother      Lipids Mother      Thyroid Disease Mother      Other - See Comments Mother      DVT during preg      Lipids Maternal Grandmother      Thyroid Disease Maternal Grandfather      Obesity Maternal Grandfather      CANCER No family hx of      no skin cancer   SOCIAL HISTORY:  Social History     Social History     Marital status: Single     Spouse name: Tai Bazan     Number of children: 0     Years of education: N/A     Occupational History     Pharmacy Tech Unknown     Social History Main Topics     Smoking status: Never Smoker     Smokeless tobacco: Former User     Quit date: 2014     Alcohol use 0.0 oz/week     Drug use: No     Sexual activity: Yes     Partners: Male     Birth control/ protection: None     Other Topics Concern      Service No     Blood Transfusions No     Caffeine Concern No     2 cups a coffee per day     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 Considering biking     Bike Helmet Yes     Seat Belt Yes     Self-Exams Yes     Social History Narrative    How much exercise per week? 3-4 times weekly    How much calcium per day? Diet - yogurt daily       How much caffeine per day? 2 cups    How much vitamin D per day? 5000 IU plus diet    Do you/your family wear seatbelts?  Yes    Do you/your family use safety helmets? n/a    Do you/your family use sunscreen? Yes    Do  "you/your family keep firearms in the home? Yes    Do you/your family have a smoke detector(s)? Yes        Do you feel safe in your home? Yes    Has anyone ever touched you in an unwanted manner? No    Margoth Coronado LPN    6/20/14           MEDICATIONS:  Current Outpatient Prescriptions   Medication Sig Dispense Refill     atorvastatin (LIPITOR) 10 MG tablet Take 1 tablet (10 mg) by mouth daily 30 tablet 0     enalapril (VASOTEC) 20 MG tablet TAKE 1 TABLET (20 MG) BY MOUTH DAILY 90 tablet 3     SUMAtriptan (IMITREX) 50 MG tablet Take 1 tablet (50 mg) by mouth at onset of headache for migraine May repeat dose in 2 hours.  Do not exceed 200 mg in 24 hours 18 tablet 1     CALCIUM PO        VITAMIN D, CHOLECALCIFEROL, PO Take 5,000 Units by mouth every other day       [DISCONTINUED] atorvastatin (LIPITOR) 10 MG tablet Take 0.5 tablets (5 mg) by mouth daily 15 tablet 0   ALLERGIES:  Review of patient's allergies indicates no known allergies.  VITALS:  Blood pressure 119/78, pulse 79, height 1.702 m (5' 7\"), weight 106.1 kg (233 lb 12.8 oz), not currently breastfeeding.  PHYSICAL EXAM:  Constitutional: Well appearing woman in no acute distress.   Psychological: appropriate mood.  Eyes: anicteric, normal extra-ocular movements,   Neurological: normal gait, no tremor.   Diagnoses and associated orders for this visit:  Hyperlipidemia LDL goal <100  -     Lipid Panel  -     ALT  -     AST  Offered to increase her atorvastatin dose, however she will wait for lab results and then follow-up with Dr. Colon's on medication refill and dosage.     Again, thank you for allowing me to participate in the care of your patient.      Sincerely,    Jolly Pruitt MD      "

## 2017-12-06 NOTE — LETTER
12/7/2017         Adriana Lucia   [address] [facility] [address]        Dear Ms. Lucia:    The results of your recent test(s) listed below were normal.   Your cholesterol has improved significantly. Recommend to continue with current dose at this time.   Nadira Colon MD                Associated Results         Results for orders placed or performed in visit on 12/06/17   Lipid Panel   Result Value Ref Range    Cholesterol 171 <200 mg/dL    Triglycerides 93 <150 mg/dL    HDL Cholesterol 46 (L) >49 mg/dL    LDL Cholesterol Calculated 106 (H) <100 mg/dL    Non HDL Cholesterol 125 <130 mg/dL   ALT   Result Value Ref Range    ALT 20 0 - 50 U/L   AST   Result Value Ref Range    AST 10 0 - 45 U/L         Please note that test explanations are brief and do not reflect all diagnostic uses.  If you have any questions or concerns, please call the clinic at [phone].      Sincerely,      Jolly Prutit MD

## 2017-12-08 ENCOUNTER — MYC REFILL (OUTPATIENT)
Dept: INTERNAL MEDICINE | Facility: CLINIC | Age: 47
End: 2017-12-08

## 2017-12-08 DIAGNOSIS — E78.5 HYPERLIPIDEMIA LDL GOAL <100: ICD-10-CM

## 2017-12-11 RX ORDER — ATORVASTATIN CALCIUM 10 MG/1
10 TABLET, FILM COATED ORAL DAILY
Qty: 90 TABLET | Refills: 3 | Status: SHIPPED | OUTPATIENT
Start: 2017-12-11 | End: 2018-09-05

## 2017-12-11 NOTE — TELEPHONE ENCOUNTER
Received refill request for lipitor, patient had recent labs WNL. Rx sent per Dr. Colon's result note

## 2017-12-11 NOTE — TELEPHONE ENCOUNTER
Message from MyChart:  Original authorizing provider: MD Adriana Lees would like a refill of the following medications:  atorvastatin (LIPITOR) 10 MG tablet [Nadira Colon MD]    Preferred pharmacy: [id number] [address]    Comment:

## 2018-01-27 ENCOUNTER — HEALTH MAINTENANCE LETTER (OUTPATIENT)
Age: 48
End: 2018-01-27

## 2018-08-10 DIAGNOSIS — I10 ESSENTIAL HYPERTENSION, BENIGN: ICD-10-CM

## 2018-08-13 RX ORDER — ENALAPRIL MALEATE 20 MG/1
TABLET ORAL
Qty: 30 TABLET | Refills: 0 | Status: SHIPPED | OUTPATIENT
Start: 2018-08-13 | End: 2018-09-05

## 2018-08-13 NOTE — TELEPHONE ENCOUNTER
Received refill request for enalapril. Annual exam due at end of August.    Spoke with Adriana and informed her refill request was received and a one month supply can be sent to pharmacy but office visit is due for further refills. She agreed with plan and will call back to schedule.

## 2018-09-04 ASSESSMENT — ENCOUNTER SYMPTOMS
BOWEL INCONTINENCE: 0
BLOOD IN STOOL: 0
HEADACHES: 1
DISTURBANCES IN COORDINATION: 0
DIARRHEA: 0
STIFFNESS: 0
JOINT SWELLING: 0
NAUSEA: 0
HEMATURIA: 0
HEARTBURN: 0
JAUNDICE: 0
MYALGIAS: 1
MUSCLE WEAKNESS: 0
ABDOMINAL PAIN: 0
RECTAL PAIN: 0
TINGLING: 0
DEPRESSION: 0
ARTHRALGIAS: 0
CONSTIPATION: 0
LOSS OF CONSCIOUSNESS: 0
FLANK PAIN: 0
WEAKNESS: 0
PANIC: 0
HOT FLASHES: 0
BLOATING: 1
MUSCLE CRAMPS: 1
DECREASED CONCENTRATION: 0
SPEECH CHANGE: 0
SEIZURES: 0
NUMBNESS: 0
DIFFICULTY URINATING: 0
MEMORY LOSS: 0
DIZZINESS: 1
NECK PAIN: 0
DYSURIA: 1
VOMITING: 0
NERVOUS/ANXIOUS: 0
DECREASED LIBIDO: 1
INSOMNIA: 1
PARALYSIS: 0
BACK PAIN: 1
TREMORS: 0

## 2018-09-04 ASSESSMENT — ANXIETY QUESTIONNAIRES
5. BEING SO RESTLESS THAT IT IS HARD TO SIT STILL: NOT AT ALL
3. WORRYING TOO MUCH ABOUT DIFFERENT THINGS: NOT AT ALL
GAD7 TOTAL SCORE: 1
7. FEELING AFRAID AS IF SOMETHING AWFUL MIGHT HAPPEN: NOT AT ALL
6. BECOMING EASILY ANNOYED OR IRRITABLE: SEVERAL DAYS
GAD7 TOTAL SCORE: 1
7. FEELING AFRAID AS IF SOMETHING AWFUL MIGHT HAPPEN: NOT AT ALL
2. NOT BEING ABLE TO STOP OR CONTROL WORRYING: NOT AT ALL
1. FEELING NERVOUS, ANXIOUS, OR ON EDGE: NOT AT ALL
4. TROUBLE RELAXING: NOT AT ALL

## 2018-09-05 ENCOUNTER — OFFICE VISIT (OUTPATIENT)
Dept: OBGYN | Facility: CLINIC | Age: 48
End: 2018-09-05
Attending: OBSTETRICS & GYNECOLOGY
Payer: COMMERCIAL

## 2018-09-05 ENCOUNTER — RADIANT APPOINTMENT (OUTPATIENT)
Dept: MAMMOGRAPHY | Facility: CLINIC | Age: 48
End: 2018-09-05
Attending: OBSTETRICS & GYNECOLOGY
Payer: COMMERCIAL

## 2018-09-05 ENCOUNTER — OFFICE VISIT (OUTPATIENT)
Dept: INTERNAL MEDICINE | Facility: CLINIC | Age: 48
End: 2018-09-05
Attending: INTERNAL MEDICINE
Payer: COMMERCIAL

## 2018-09-05 VITALS
DIASTOLIC BLOOD PRESSURE: 76 MMHG | HEART RATE: 92 BPM | BODY MASS INDEX: 35.24 KG/M2 | WEIGHT: 225 LBS | SYSTOLIC BLOOD PRESSURE: 112 MMHG

## 2018-09-05 VITALS
HEIGHT: 67 IN | HEART RATE: 83 BPM | BODY MASS INDEX: 35.31 KG/M2 | SYSTOLIC BLOOD PRESSURE: 112 MMHG | WEIGHT: 225 LBS | DIASTOLIC BLOOD PRESSURE: 76 MMHG

## 2018-09-05 DIAGNOSIS — I10 ESSENTIAL HYPERTENSION, BENIGN: ICD-10-CM

## 2018-09-05 DIAGNOSIS — Z12.31 VISIT FOR SCREENING MAMMOGRAM: ICD-10-CM

## 2018-09-05 DIAGNOSIS — R39.15 URINARY URGENCY: ICD-10-CM

## 2018-09-05 DIAGNOSIS — E78.00 PURE HYPERCHOLESTEROLEMIA: Primary | ICD-10-CM

## 2018-09-05 DIAGNOSIS — E78.5 HYPERLIPIDEMIA LDL GOAL <100: ICD-10-CM

## 2018-09-05 DIAGNOSIS — N39.41 URGE INCONTINENCE OF URINE: ICD-10-CM

## 2018-09-05 DIAGNOSIS — Z12.4 SCREENING FOR MALIGNANT NEOPLASM OF CERVIX: ICD-10-CM

## 2018-09-05 DIAGNOSIS — Z01.419 ENCOUNTER FOR GYNECOLOGICAL EXAMINATION WITHOUT ABNORMAL FINDING: Primary | ICD-10-CM

## 2018-09-05 LAB
ALBUMIN UR-MCNC: NEGATIVE MG/DL
ANION GAP SERPL CALCULATED.3IONS-SCNC: 6 MMOL/L (ref 3–14)
APPEARANCE UR: CLEAR
BILIRUB UR QL STRIP: NEGATIVE
BUN SERPL-MCNC: 10 MG/DL (ref 7–30)
CALCIUM SERPL-MCNC: 8.7 MG/DL (ref 8.5–10.1)
CHLORIDE SERPL-SCNC: 107 MMOL/L (ref 94–109)
CHOLEST SERPL-MCNC: 207 MG/DL
CO2 SERPL-SCNC: 29 MMOL/L (ref 20–32)
COLOR UR AUTO: NORMAL
CREAT SERPL-MCNC: 0.72 MG/DL (ref 0.52–1.04)
GFR SERPL CREATININE-BSD FRML MDRD: 86 ML/MIN/1.7M2
GLUCOSE SERPL-MCNC: 90 MG/DL (ref 70–99)
GLUCOSE UR STRIP-MCNC: NEGATIVE MG/DL
HDLC SERPL-MCNC: 53 MG/DL
HGB UR QL STRIP: NEGATIVE
KETONES UR STRIP-MCNC: NEGATIVE MG/DL
LDLC SERPL CALC-MCNC: 135 MG/DL
LEUKOCYTE ESTERASE UR QL STRIP: NEGATIVE
NITRATE UR QL: NEGATIVE
NONHDLC SERPL-MCNC: 154 MG/DL
PH UR STRIP: 6.5 PH (ref 5–7)
POTASSIUM SERPL-SCNC: 4 MMOL/L (ref 3.4–5.3)
RBC #/AREA URNS AUTO: 1 /HPF (ref 0–2)
SODIUM SERPL-SCNC: 142 MMOL/L (ref 133–144)
SOURCE: NORMAL
SP GR UR STRIP: 1.01 (ref 1–1.03)
TRIGL SERPL-MCNC: 94 MG/DL
UROBILINOGEN UR STRIP-MCNC: NORMAL MG/DL (ref 0–2)
WBC #/AREA URNS AUTO: <1 /HPF (ref 0–5)

## 2018-09-05 PROCEDURE — 87624 HPV HI-RISK TYP POOLED RSLT: CPT | Performed by: OBSTETRICS & GYNECOLOGY

## 2018-09-05 PROCEDURE — 80061 LIPID PANEL: CPT | Performed by: INTERNAL MEDICINE

## 2018-09-05 PROCEDURE — 81001 URINALYSIS AUTO W/SCOPE: CPT | Performed by: OBSTETRICS & GYNECOLOGY

## 2018-09-05 PROCEDURE — 77067 SCR MAMMO BI INCL CAD: CPT

## 2018-09-05 PROCEDURE — G0145 SCR C/V CYTO,THINLAYER,RESCR: HCPCS | Performed by: OBSTETRICS & GYNECOLOGY

## 2018-09-05 PROCEDURE — 87086 URINE CULTURE/COLONY COUNT: CPT | Performed by: OBSTETRICS & GYNECOLOGY

## 2018-09-05 PROCEDURE — 36415 COLL VENOUS BLD VENIPUNCTURE: CPT | Performed by: INTERNAL MEDICINE

## 2018-09-05 PROCEDURE — G0463 HOSPITAL OUTPT CLINIC VISIT: HCPCS | Mod: ZF

## 2018-09-05 PROCEDURE — 80048 BASIC METABOLIC PNL TOTAL CA: CPT | Performed by: INTERNAL MEDICINE

## 2018-09-05 RX ORDER — ATORVASTATIN CALCIUM 10 MG/1
10 TABLET, FILM COATED ORAL DAILY
Qty: 90 TABLET | Refills: 3 | Status: SHIPPED | OUTPATIENT
Start: 2018-09-05 | End: 2018-09-06

## 2018-09-05 RX ORDER — ENALAPRIL MALEATE 20 MG/1
20 TABLET ORAL DAILY
Qty: 90 TABLET | Refills: 3 | Status: SHIPPED | OUTPATIENT
Start: 2018-09-05 | End: 2019-10-10

## 2018-09-05 ASSESSMENT — PATIENT HEALTH QUESTIONNAIRE - PHQ9: 5. POOR APPETITE OR OVEREATING: NOT AT ALL

## 2018-09-05 ASSESSMENT — ENCOUNTER SYMPTOMS
POLYPHAGIA: 0
POLYDIPSIA: 0
INSOMNIA: 0
VOMITING: 0
SWOLLEN GLANDS: 0
NAUSEA: 0
DYSPNEA ON EXERTION: 0
PANIC: 0
ALTERED TEMPERATURE REGULATION: 0
ABDOMINAL PAIN: 0
FATIGUE: 0
BRUISES/BLEEDS EASILY: 0
CONSTIPATION: 0
COUGH: 0
DECREASED CONCENTRATION: 0
DIARRHEA: 0
CHILLS: 0
FEVER: 0
NERVOUS/ANXIOUS: 0
DEPRESSION: 0

## 2018-09-05 ASSESSMENT — ANXIETY QUESTIONNAIRES
3. WORRYING TOO MUCH ABOUT DIFFERENT THINGS: NOT AT ALL
7. FEELING AFRAID AS IF SOMETHING AWFUL MIGHT HAPPEN: NOT AT ALL
1. FEELING NERVOUS, ANXIOUS, OR ON EDGE: NOT AT ALL
GAD7 TOTAL SCORE: 1
2. NOT BEING ABLE TO STOP OR CONTROL WORRYING: NOT AT ALL
5. BEING SO RESTLESS THAT IT IS HARD TO SIT STILL: NOT AT ALL
6. BECOMING EASILY ANNOYED OR IRRITABLE: SEVERAL DAYS

## 2018-09-05 NOTE — PROGRESS NOTES
OB/GYN   Clinic Progress Note  2018         Assessment and Plan:      Adriana is a 47 year old female  here for her annual physical exam her last Pap was 2014 here today for her annual physical, and urinary urge incontinence.    1. Encounter for routine Annual Physical with Pap Smear- It has been 3 years since her previous PAP smear. The patient elected to have a PAP with HPV co testing preformed today. If negative her next pap will be in 5 years. No concerning findings on physical exam.  - Mammogram screening as needed.  - Pap with HPV co testing if normal abnormalities due in     2. Urge incontinence- Her symptoms predominantly sound like she has urges to pee frequently and is having accidents 3-4/week most likely cause is urinary urgency/urge incontinence. It is also possible she has underlying UTI.  - UA with culture  - Instructed to keep a bladder journal with drinks, how often she is peeing and accidents.  - Referral to Urogynecology for further evaluation    3. Vaginal Dryness- She has complaints of occasional vaginal dryness. No evidence of atrophy today on exam. Continue to monitor for Vaginal dryness as she enters menopause.     Discussed with staff, Dr. Wandy Nascimento MS3 acting as scribe for Dr. Philip    Staff MD Note    I appreciate the note above by IKER Giraldo. I agree with the PFSH and ROS as completed by the MS. The remainder of the encounter was performed by me and scribed by the MS. The scribed note accurately reflects my personal services and the decisions made by me.    Nel Saba MD              Chief Complaint:   Annual physical, urinary incontinence.         History of Present Illness:   Adriana is a 47 year old female  here for her annual physical exam her last Pap was 2014 was normal and HPV negative, she had 1 abnormal PAP in her 20's with normal colposcopy. Her biggest concern today is bladder issues. She has always had issues with  incontinence; however, they have gotten progressively worse since the birth of her son 3 years ago. She describes it as always having to go to the bathroom. When she urinates it is large amounts of fluid. If she does not go to the bathroom when she has an urge she will have an accident. She has 3-4 accidents/week. She does not have any incontinence problems with laughing, sneezing, coughing or exercise. A few days ago she had some suprapubic pain and increased urinary urgency. She has had this on and off for a few months, but has not been evaluated for UTI.     She also has been experiencing occasional vaginal dryness. It is occasionally painful with intercourse. Does use lubrications and does not have pain with every episode of intercourse.    Her LMP was Aug 26th. She is not using birth control and is sexually active with 1 male partner. She is getting her mammogram today.          Past Medical History:     Past Medical History:   Diagnosis Date     HSV (herpes simplex virus) infection      Hypertension              Past Surgical History:     Past Surgical History:   Procedure Laterality Date      SECTION N/A 2015    Procedure:  SECTION;  Surgeon: Meredith Clark MD;  Location:  L+D     [facility]      -             Social History:     Social History   Substance Use Topics     Smoking status: Never Smoker     Smokeless tobacco: Former User     Quit date: 2014     Alcohol use 0.0 oz/week             Family History:     Family History   Problem Relation Age of Onset     Asthma Father      Allergies Father      Lipids Father      Cerebrovascular Disease Father      Hypertension Father      Hyperlipidemia Father      Diabetes Father      Obesity Father      Alcohol/Drug Paternal Grandfather      Substance Abuse Paternal Grandfather      Allergies Mother      Lipids Mother      Thyroid Disease Mother      Other - See Comments Mother      DVT during preg      Hypertension  "Mother      Hyperlipidemia Mother      Anxiety Disorder Mother      Depression Mother      Obesity Mother      Lipids Maternal Grandmother      Hyperlipidemia Maternal Grandmother      Obesity Maternal Grandmother      Thyroid Disease Maternal Grandfather      Obesity Maternal Grandfather      Cancer No family hx of      no skin cancer               Allergies:   No Known Allergies          Medications:     Current Outpatient Prescriptions   Medication Sig     atorvastatin (LIPITOR) 10 MG tablet Take 1 tablet (10 mg) by mouth daily     CALCIUM PO      enalapril (VASOTEC) 20 MG tablet Take 1 tablet (20 mg) by mouth daily     SUMAtriptan (IMITREX) 50 MG tablet Take 1 tablet (50 mg) by mouth at onset of headache for migraine May repeat dose in 2 hours.  Do not exceed 200 mg in 24 hours     VITAMIN D, CHOLECALCIFEROL, PO Take 5,000 Units by mouth every other day     [DISCONTINUED] atorvastatin (LIPITOR) 10 MG tablet Take 1 tablet (10 mg) by mouth daily     [DISCONTINUED] enalapril (VASOTEC) 20 MG tablet take 1 tablet by mouth daily     No current facility-administered medications for this visit.      Facility-Administered Medications Ordered in Other Visits   Medication     bupivacaine 0.25 % - EPINEPHrine 1:200,000 injection               Review of Systems:   10-point ROS otherwise negative except as noted above.          Physical Exam:   All vitals have been reviewed    /76  Pulse 83  Ht 1.702 m (5' 7\")  Wt 102.1 kg (225 lb)  LMP 08/26/2018  BMI 35.24 kg/m2      Physical Exam:  Gen: alert and awake, no acute distress  HEENT: EOM intact  CV: RRR, normal S1 and S2, no murmurs or gallops  Resp: clear to ascultation all lung fields bilaterally   Abd- soft, nontender, nondistended  Neurologic- normal muscle strength and mental status    Breast Exam:  Breast: without visible skin changes. No dimpling or lesions seen.   Breasts supple, non-tender with palpation, no dominant mass, nodularity, or nipple discharge " noted bilaterally. Axillary nodes negative.      Pelvic Exam  EG/BUS: Normal genital architecture without lesions, erythema or abnormal secretions Bartholin's, Urethra, [place]'s normal             Urethral meatus: normal   Urethra: no masses, tenderness, or scarring   Bladder: bladder tenderness,  no masses  Vagina: moist, pink, rugae with physiologic discharge  secretions  Cervix:  no lesions, deeply located  Uterus: midposition, and small, smooth, firm, mobile w/o pain  Adnexa: Within normal limits and No masses, nodularity, tenderness  Rectum: anus normal             Data:   All laboratory data reviewed    Lab: UA pending      Imaging: none            Answers for HPI/ROS submitted by the patient on 9/4/2018   LANDEN 7 TOTAL SCORE: 1  PHQ-2 Score: 0  General Symptoms: No  Skin Symptoms: No  HENT Symptoms: No  EYE SYMPTOMS: No  HEART SYMPTOMS: No  LUNG SYMPTOMS: No  INTESTINAL SYMPTOMS: Yes  URINARY SYMPTOMS: Yes  GYNECOLOGIC SYMPTOMS: Yes  BREAST SYMPTOMS: No  SKELETAL SYMPTOMS: Yes  BLOOD SYMPTOMS: No  NERVOUS SYSTEM SYMPTOMS: Yes  MENTAL HEALTH SYMPTOMS: Yes  Heart burn or indigestion: No  Nausea: No  Vomiting: No  Abdominal pain: No  Bloating: Yes  Constipation: No  Diarrhea: No  Blood in stool: No  Black stools: No  Rectal or Anal pain: No  Fecal incontinence: No  Yellowing of skin or eyes: No  Vomit with blood: No  Change in stools: No  Trouble holding urine or incontinence: Yes  Pain or burning: Yes  Trouble starting or stopping: No  Increased frequency of urination: Yes  Blood in urine: No  Decreased frequency of urination: No  Frequent nighttime urination: Yes  Flank pain: No  Difficulty emptying bladder: No  Back pain: Yes  Muscle aches: Yes  Neck pain: No  Swollen joints: No  Joint pain: No  Bone pain: No  Muscle cramps: Yes  Muscle weakness: No  Joint stiffness: No  Bone fracture: No  Trouble with coordination: No  Dizziness or trouble with balance: Yes  Fainting or black-out spells: No  Memory loss:  No  Headache: Yes  Seizures: No  Speech problems: No  Tingling: No  Tremor: No  Weakness: No  Difficulty walking: No  Paralysis: No  Numbness: No  Bleeding or spotting between periods: No  Heavy or painful periods: Yes  Irregular periods: No  Vaginal discharge: No  Hot flashes: No  Vaginal dryness: Yes  Genital ulcers: No  Reduced libido: Yes  Painful intercourse: No  Difficulty with sexual arousal: No  Post-menopausal bleeding: No  Nervous or Anxious: No  Depression: No  Trouble sleeping: Yes  Trouble thinking or concentrating: No  Mood changes: Yes  Panic attacks: No

## 2018-09-05 NOTE — PROGRESS NOTES
HPI  Patient is here for follow up on hypertension. She reports that she has been feeling well. She denies cough, chest pain or shortness of breath. Patient reports that she has been dealing with occasional episodes of vertigo, typically with positioning of the head to the right. She reports that the sensation goes away fairly quickly. The episodes are not very frequent.     Review of Systems     Constitutional:  Negative for fever, chills and fatigue.   HENT:  Negative for dry mouth.    Respiratory:   Negative for cough and dyspnea on exertion.    Cardiovascular:  Negative for chest pain, dyspnea on exertion and edema.   Gastrointestinal:  Negative for nausea, vomiting, abdominal pain, diarrhea and constipation.   Skin:  Negative for itching and rash.   Endo/Heme:  Negative for anemia, swollen glands and bruises/bleeds easily.   Psychiatric/Behavioral:  Negative for depression, decreased concentration, mood swings and panic attacks.    Endocrine:  Negative for altered temperature regulation, polyphagia, polydipsia, unwanted hair growth and change in facial hair.    Current Outpatient Prescriptions   Medication     atorvastatin (LIPITOR) 10 MG tablet     CALCIUM PO     enalapril (VASOTEC) 20 MG tablet     SUMAtriptan (IMITREX) 50 MG tablet     VITAMIN D, CHOLECALCIFEROL, PO     No current facility-administered medications for this visit.      Facility-Administered Medications Ordered in Other Visits   Medication     bupivacaine 0.25 % - EPINEPHrine 1:200,000 injection     Vitals:    09/05/18 0912 09/05/18 0913 09/05/18 0914   BP: 110/76 106/74 112/76   Pulse: 83 79 92   Weight: 102.1 kg (225 lb)           Physical Exam   Constitutional: She is well-developed, well-nourished, and in no distress.   HENT:   Head: Normocephalic and atraumatic.   Eyes: Conjunctivae are normal. Pupils are equal, round, and reactive to light.   Cardiovascular: Normal rate.    Pulmonary/Chest: Effort normal.   Musculoskeletal: She exhibits  no edema.   Skin: Skin is warm and dry.   Psychiatric: Mood, memory, affect and judgment normal.   Vitals reviewed.    Assessment and Plan:  Adriana was seen today for recheck.    Diagnoses and all orders for this visit:    Pure hypercholesterolemia.  Reviewed management of hyperlipidemia with patient.  Recommend checking fasting lipid panel today.  Patient will be advised on test results accordingly.  She was also advised that increasing the dose of atorvastatin may be considered.  -     Lipid Profile    Essential hypertension, benign.  Blood pressure is at goal.  Patient denies medication related side effects.  Recommend to continue with current medical therapy without changes.  Patient is in agreement with the plan  -     enalapril (VASOTEC) 20 MG tablet; Take 1 tablet (20 mg) by mouth daily  -     Basic Metabolic Panel    Hyperlipidemia LDL goal <100  -     atorvastatin (LIPITOR) 10 MG tablet; Take 1 tablet (10 mg) by mouth daily      Total time spent 25 minutes.  More than 50% of the time spent with Ms. Lucia on counseling / coordinating her care    Nadira Colon MD

## 2018-09-05 NOTE — LETTER
9/5/2018     RE: Adriana Lucia  [address] [facility] [address]     Dear Colleague,    Thank you for referring your patient, Adriana Lucia, to the [facility]  at [facility]. Please see a copy of my visit note below.    HPI  Patient is here for follow up on hypertension. She reports that she has been feeling well. She denies cough, chest pain or shortness of breath. Patient reports that she has been dealing with occasional episodes of vertigo, typically with positioning of the head to the right. She reports that the sensation goes away fairly quickly. The episodes are not very frequent.     Review of Systems     Constitutional:  Negative for fever, chills and fatigue.   HENT:  Negative for dry mouth.    Respiratory:   Negative for cough and dyspnea on exertion.    Cardiovascular:  Negative for chest pain, dyspnea on exertion and edema.   Gastrointestinal:  Negative for nausea, vomiting, abdominal pain, diarrhea and constipation.   Skin:  Negative for itching and rash.   Endo/Heme:  Negative for anemia, swollen glands and bruises/bleeds easily.   Psychiatric/Behavioral:  Negative for depression, decreased concentration, mood swings and panic attacks.    Endocrine:  Negative for altered temperature regulation, polyphagia, polydipsia, unwanted hair growth and change in facial hair.    Current Outpatient Prescriptions   Medication     atorvastatin (LIPITOR) 10 MG tablet     CALCIUM PO     enalapril (VASOTEC) 20 MG tablet     SUMAtriptan (IMITREX) 50 MG tablet     VITAMIN D, CHOLECALCIFEROL, PO     No current facility-administered medications for this visit.      Facility-Administered Medications Ordered in Other Visits   Medication     bupivacaine 0.25 % - EPINEPHrine 1:200,000 injection     Vitals:    09/05/18 0912 09/05/18 0913 09/05/18 0914   BP: 110/76 106/74 112/76   Pulse: 83 79 92   Weight: 102.1 kg (225 lb)           Physical  Exam   Constitutional: She is well-developed, well-nourished, and in no distress.   HENT:   Head: Normocephalic and atraumatic.   Eyes: Conjunctivae are normal. Pupils are equal, round, and reactive to light.   Cardiovascular: Normal rate.    Pulmonary/Chest: Effort normal.   Musculoskeletal: She exhibits no edema.   Skin: Skin is warm and dry.   Psychiatric: Mood, memory, affect and judgment normal.   Vitals reviewed.    Assessment and Plan:  Adriana was seen today for recheck.    Diagnoses and all orders for this visit:    Pure hypercholesterolemia.  Reviewed management of hyperlipidemia with patient.  Recommend checking fasting lipid panel today.  Patient will be advised on test results accordingly.  She was also advised that increasing the dose of atorvastatin may be considered.  -     Lipid Profile    Essential hypertension, benign.  Blood pressure is at goal.  Patient denies medication related side effects.  Recommend to continue with current medical therapy without changes.  Patient is in agreement with the plan  -     enalapril (VASOTEC) 20 MG tablet; Take 1 tablet (20 mg) by mouth daily  -     Basic Metabolic Panel    Hyperlipidemia LDL goal <100  -     atorvastatin (LIPITOR) 10 MG tablet; Take 1 tablet (10 mg) by mouth daily      Total time spent 25 minutes.  More than 50% of the time spent with Ms. Lucia on counseling / coordinating her care    Nadira Colon MD

## 2018-09-05 NOTE — LETTER
9/5/2018         Adriana Lucia   [address] [facility] [address]        Dear Ms. Lucia:    Your cholesterol was elevated. Increasing the dose of atorvastatin can be considered. Please, contact the clinic to discuss options.     Results for orders placed or performed in visit on 09/05/18   Basic Metabolic Panel   Result Value Ref Range    Sodium 142 133 - 144 mmol/L    Potassium 4.0 3.4 - 5.3 mmol/L    Chloride 107 94 - 109 mmol/L    Carbon Dioxide 29 20 - 32 mmol/L    Anion Gap 6 3 - 14 mmol/L    Glucose 90 70 - 99 mg/dL    Urea Nitrogen 10 7 - 30 mg/dL    Creatinine 0.72 0.52 - 1.04 mg/dL    GFR Estimate 86 >60 mL/min/1.7m2    GFR Estimate If Black >90 >60 mL/min/1.7m2    Calcium 8.7 8.5 - 10.1 mg/dL   Lipid Profile   Result Value Ref Range    Cholesterol 207 (H) <200 mg/dL    Triglycerides 94 <150 mg/dL    HDL Cholesterol 53 >49 mg/dL    LDL Cholesterol Calculated 135 (H) <100 mg/dL    Non HDL Cholesterol 154 (H) <130 mg/dL         Please note that test explanations are brief and do not reflect all diagnostic uses.  If you have any questions or concerns, please call the clinic at [phone].      Sincerely,      Crissy Dozier sent on behalf of  Nadira Colon MD

## 2018-09-05 NOTE — MR AVS SNAPSHOT
After Visit Summary   9/5/2018    Adriana Lucia    MRN: [id number]           Patient Information     Date Of Birth          1970        Visit Information        Provider Department      9/5/2018 10:00 AM Nel Saba MD [facility]        Today's Diagnoses     Encounter for gynecological examination without abnormal finding    -  1    Screening for malignant neoplasm of cervix        Urinary urgency        Urge incontinence of urine           Follow-ups after your visit        Additional Services     UROLOGY ADULT REFERRAL       Your provider has referred you to: [facility] [place] [facility] [place] [phone]   [email or web address]    Dr. Hawk, Dr. Lizarraga or Dr. Domínguez    Please be aware that coverage of these services is subject to the terms and limitations of your health insurance plan.  Call member services at your health plan with any benefit or coverage questions.      Please bring the following with you to your appointment:    (1) Any X-Rays, CTs or MRIs which have been performed.  Contact the facility where they were done to arrange for  prior to your scheduled appointment.    (2) List of current medications  (3) This referral request   (4) Any documents/labs given to you for this referral                  Follow-up notes from your care team     Return in about 2 weeks (around 9/19/2018) for Needs new patient appointment with Carine.      Your next 10 appointments already scheduled     Sep 27, 2018 10:00 AM CDT   New Urogyn with Eyad Hawk MD   [facility] ([facility])    [facility]  [address] [facility] [address]  [facility] [address] [phone]           Please call [facility] , [phone], with any questions or concerns you may have regarding your appointment.              Who to contact     Please call your clinic at [phone]  "to:    Ask questions about your health    Make or cancel appointments    Discuss your medicines    Learn about your test results    Speak to your doctor            Additional Information About Your Visit        [email or web address] Information     [employer] gives you secure access to your electronic health record. If you see a primary care provider, you can also send messages to your care team and make appointments. If you have questions, please call your primary care clinic.  If you do not have a primary care provider, please call [phone] and they will assist you.      [employer]pert is an electronic gateway that provides easy, online access to your medical records. With [employer], you can request a clinic appointment, read your test results, renew a prescription or communicate with your care team.     To access your existing account, please contact your [facility] or call [phone] for assistance.        Care EveryWhere ID     This is your Care EveryWhere ID. This could be used by other organizations to access your [place] medical records  [id number]        Your Vitals Were     Pulse Height Last Period BMI (Body Mass Index)          83 1.702 m (5' 7\") 08/26/2018 35.24 kg/m2         Blood Pressure from Last 3 Encounters:   09/05/18 112/76   09/05/18 112/76   12/06/17 119/78    Weight from Last 3 Encounters:   09/05/18 102.1 kg (225 lb)   09/05/18 102.1 kg (225 lb)   12/06/17 106.1 kg (233 lb 12.8 oz)              We Performed the Following     HPV High Risk Types DNA Cervical     Obtaining, preparing and conveyance of cervical or vaginal smear to laboratory.     Pap imaged thin layer screen with HPV - recommended age 30 - 65 years (select HPV order below)     Urinalysis - No culture (Collected in Clinic)     Urine Culture Aerobic Bacterial     UROLOGY ADULT REFERRAL          Today's Medication Changes          These changes are accurate as of 9/5/18  3:23 PM.  If you have any questions, ask your " nurse or doctor.               These medicines have changed or have updated prescriptions.        Dose/Directions    enalapril 20 MG tablet   Commonly known as:  VASOTEC   This may have changed:  See the new instructions.   Used for:  Essential hypertension, benign   Changed by:  Nadira Colon MD        Dose:  20 mg   Take 1 tablet (20 mg) by mouth daily   Quantity:  90 tablet   Refills:  3            Where to get your medicines      These medications were sent to [id number] [address] [address]     Phone:  [phone]     atorvastatin 10 MG tablet    enalapril 20 MG tablet                Primary Care Provider Fax #    Physician No Ref-Primary [phone]       No address on file        Equal Access to Services     NIA MOTA : Rakan Ndiaye, wasienna rome, qaybta kaalmada tara, beverly franks. So [facility] [phone].    ATENCIÓN: Si habla español, tiene a bolanos disposición servicios gratuitos de asistencia lingüística. Llame al [phone].    We comply with applicable federal civil rights laws and [place] laws. We do not discriminate on the basis of race, color, national origin, age, disability, sex, sexual orientation, or gender identity.            Thank you!     Thank you for choosing [facility]  for your care. Our goal is always to provide you with excellent care. Hearing back from our patients is one way we can continue to improve our services. Please take a few minutes to complete the written survey that you may receive in the mail after your visit with us. Thank you!             Your Updated Medication List - Protect others around you: Learn how to safely use, store and throw away your medicines at www.disposemymeds.org.          This list is accurate as of 9/5/18  3:23 PM.  Always use your most recent med list.                   Brand Name Dispense  Instructions for use Diagnosis    atorvastatin 10 MG tablet    LIPITOR    90 tablet    Take 1 tablet (10 mg) by mouth daily    Hyperlipidemia LDL goal <100       CALCIUM PO           enalapril 20 MG tablet    VASOTEC    90 tablet    Take 1 tablet (20 mg) by mouth daily    Essential hypertension, benign       SUMAtriptan 50 MG tablet    IMITREX    18 tablet    Take 1 tablet (50 mg) by mouth at onset of headache for migraine May repeat dose in 2 hours.  Do not exceed 200 mg in 24 hours    Migraine with aura and without status migrainosus, not intractable       VITAMIN D (CHOLECALCIFEROL) PO      Take 5,000 Units by mouth every other day

## 2018-09-05 NOTE — MR AVS SNAPSHOT
After Visit Summary   9/5/2018    Adriana Lucia    MRN: [id number]           Patient Information     Date Of Birth          1970        Visit Information        Provider Department      9/5/2018 9:00 AM Nadira Colon MD [facility]         Today's Diagnoses     Pure hypercholesterolemia    -  1    Essential hypertension, benign        Hyperlipidemia LDL goal <100           Follow-ups after your visit        Your next 10 appointments already scheduled     Sep 27, 2018 10:00 AM CDT   New Urogyn with Eyad Hawk MD   [facility] ([facility])    [facility]  [address]  [facility] [address] [phone]           Please call [facility] , [phone], with any questions or concerns you may have regarding your appointment.              Who to contact     Please call your clinic at [phone] to:    Ask questions about your health    Make or cancel appointments    Discuss your medicines    Learn about your test results    Speak to your doctor            Additional Information About Your Visit        D[employer] Information     [employer] gives you secure access to your electronic health record. If you see a primary care provider, you can also send messages to your care team and make appointments. If you have questions, please call your primary care clinic.  If you do not have a primary care provider, please call [phone] and they will assist you.      [employer] is an electronic gateway that provides easy, online access to your medical records. With [employer], you can request a clinic appointment, read your test results, renew a prescription or communicate with your care team.     To access your existing account, please contact your [facility] or call [phone] for assistance.        Care EveryWhere ID     This is your Care EveryWhere ID. This  could be used by other organizations to access your [place] medical records  [id number]        Your Vitals Were     Pulse Last Period Breastfeeding? BMI (Body Mass Index)          92 08/26/2018 No 35.24 kg/m2         Blood Pressure from Last 3 Encounters:   09/05/18 112/76   09/05/18 112/76   12/06/17 119/78    Weight from Last 3 Encounters:   09/05/18 102.1 kg (225 lb)   09/05/18 102.1 kg (225 lb)   12/06/17 106.1 kg (233 lb 12.8 oz)              We Performed the Following     Basic Metabolic Panel     Lipid Profile          Today's Medication Changes          These changes are accurate as of 9/5/18  3:14 PM.  If you have any questions, ask your nurse or doctor.               These medicines have changed or have updated prescriptions.        Dose/Directions    enalapril 20 MG tablet   Commonly known as:  VASOTEC   This may have changed:  See the new instructions.   Used for:  Essential hypertension, benign   Changed by:  Nadira Colon MD        Dose:  20 mg   Take 1 tablet (20 mg) by mouth daily   Quantity:  90 tablet   Refills:  3            Where to get your medicines      These medications were sent to [id number] [place], [place] - [address] [place] [address]     Phone:  [phone]     atorvastatin 10 MG tablet    enalapril 20 MG tablet                Primary Care Provider Fax #    Physician No Ref-Primary [phone]       No address on file        Equal Access to Services     NIA MOTA AH: Rakan Ndiaye, emily rome, qapuneet kaalbeverly novoa . So [facility] [phone].    ATENCIÓN: Si habla español, tiene a bolanos disposición servicios gratuitos de asistencia lingüística. Llame al [phone].    We comply with applicable federal civil rights laws and [place] laws. We do not discriminate on the basis of race, color, national origin, age, disability, sex, sexual orientation, or gender  identity.            Thank you!     Thank you for choosing [facility]   for your care. Our goal is always to provide you with excellent care. Hearing back from our patients is one way we can continue to improve our services. Please take a few minutes to complete the written survey that you may receive in the mail after your visit with us. Thank you!             Your Updated Medication List - Protect others around you: Learn how to safely use, store and throw away your medicines at www.disposemymeds.org.          This list is accurate as of 9/5/18  3:14 PM.  Always use your most recent med list.                   Brand Name Dispense Instructions for use Diagnosis    atorvastatin 10 MG tablet    LIPITOR    90 tablet    Take 1 tablet (10 mg) by mouth daily    Hyperlipidemia LDL goal <100       CALCIUM PO           enalapril 20 MG tablet    VASOTEC    90 tablet    Take 1 tablet (20 mg) by mouth daily    Essential hypertension, benign       SUMAtriptan 50 MG tablet    IMITREX    18 tablet    Take 1 tablet (50 mg) by mouth at onset of headache for migraine May repeat dose in 2 hours.  Do not exceed 200 mg in 24 hours    Migraine with aura and without status migrainosus, not intractable       VITAMIN D (CHOLECALCIFEROL) PO      Take 5,000 Units by mouth every other day

## 2018-09-05 NOTE — LETTER
2018     RE: Adriana Lucia  [address] [facility] [place] [address]     Dear Colleague,    Thank you for referring your patient, Adriana Lucia, to the [facility] at [facility]. Please see a copy of my visit note below.    OB/GYN   Clinic Progress Note  2018         Assessment and Plan:      Adriana is a 47 year old female  here for her annual physical exam her last Pap was 2014 here today for her annual physical, and urinary urge incontinence.    1. Encounter for routine Annual Physical with Pap Smear- It has been 3 years since her previous PAP smear. The patient elected to have a PAP with HPV co testing preformed today. If negative her next pap will be in 5 years. No concerning findings on physical exam.  - Mammogram screening as needed.  - Pap with HPV co testing if normal abnormalities due in     2. Urge incontinence- Her symptoms predominantly sound like she has urges to pee frequently and is having accidents 3-4/week most likely cause is urinary urgency/urge incontinence. It is also possible she has underlying UTI.  - UA with culture  - Instructed to keep a bladder journal with drinks, how often she is peeing and accidents.  - Referral to Urogynecology for further evaluation    3. Vaginal Dryness- She has complaints of occasional vaginal dryness. No evidence of atrophy today on exam. Continue to monitor for Vaginal dryness as she enters menopause.     Discussed with staff, Dr. Wandy Nascimento MS3 acting as scribe for Dr. Cedrick Miguel MD Note    I appreciate the note above by IKER Giraldo. I agree with the PFSH and ROS as completed by the MS. The remainder of the encounter was performed by me and scribed by the MS. The scribed note accurately reflects my personal services and the decisions made by me.    Nel Saba MD              Chief Complaint:   Annual physical, urinary  incontinence.         History of Present Illness:   Adriana is a 47 year old female  here for her annual physical exam her last Pap was 2014 was normal and HPV negative, she had 1 abnormal PAP in her 20's with normal colposcopy. Her biggest concern today is bladder issues. She has always had issues with incontinence; however, they have gotten progressively worse since the birth of her son 3 years ago. She describes it as always having to go to the bathroom. When she urinates it is large amounts of fluid. If she does not go to the bathroom when she has an urge she will have an accident. She has 3-4 accidents/week. She does not have any incontinence problems with laughing, sneezing, coughing or exercise. A few days ago she had some suprapubic pain and increased urinary urgency. She has had this on and off for a few months, but has not been evaluated for UTI.     She also has been experiencing occasional vaginal dryness. It is occasionally painful with intercourse. Does use lubrications and does not have pain with every episode of intercourse.    Her LMP was Aug 26th. She is not using birth control and is sexually active with 1 male partner. She is getting her mammogram today.          Past Medical History:     Past Medical History:   Diagnosis Date     HSV (herpes simplex virus) infection      Hypertension              Past Surgical History:     Past Surgical History:   Procedure Laterality Date      SECTION N/A 2015    Procedure:  SECTION;  Surgeon: Meredith Clark MD;  Location: [facility]                   Social History:     Social History   Substance Use Topics     Smoking status: Never Smoker     Smokeless tobacco: Former User     Quit date: 2014     Alcohol use 0.0 oz/week             Family History:     Family History   Problem Relation Age of Onset     Asthma Father      Allergies Father      Lipids Father      Cerebrovascular Disease Father       "Hypertension Father      Hyperlipidemia Father      Diabetes Father      Obesity Father      Alcohol/Drug Paternal Grandfather      Substance Abuse Paternal Grandfather      Allergies Mother      Lipids Mother      Thyroid Disease Mother      Other - See Comments Mother      DVT during preg      Hypertension Mother      Hyperlipidemia Mother      Anxiety Disorder Mother      Depression Mother      Obesity Mother      Lipids Maternal Grandmother      Hyperlipidemia Maternal Grandmother      Obesity Maternal Grandmother      Thyroid Disease Maternal Grandfather      Obesity Maternal Grandfather      Cancer No family hx of      no skin cancer               Allergies:   No Known Allergies          Medications:     Current Outpatient Prescriptions   Medication Sig     atorvastatin (LIPITOR) 10 MG tablet Take 1 tablet (10 mg) by mouth daily     CALCIUM PO      enalapril (VASOTEC) 20 MG tablet Take 1 tablet (20 mg) by mouth daily     SUMAtriptan (IMITREX) 50 MG tablet Take 1 tablet (50 mg) by mouth at onset of headache for migraine May repeat dose in 2 hours.  Do not exceed 200 mg in 24 hours     VITAMIN D, CHOLECALCIFEROL, PO Take 5,000 Units by mouth every other day     [DISCONTINUED] atorvastatin (LIPITOR) 10 MG tablet Take 1 tablet (10 mg) by mouth daily     [DISCONTINUED] enalapril (VASOTEC) 20 MG tablet take 1 tablet by mouth daily     No current facility-administered medications for this visit.      Facility-Administered Medications Ordered in Other Visits   Medication     bupivacaine 0.25 % - EPINEPHrine 1:200,000 injection               Review of Systems:   10-point ROS otherwise negative except as noted above.          Physical Exam:   All vitals have been reviewed    /76  Pulse 83  Ht 1.702 m (5' 7\")  Wt 102.1 kg (225 lb)  LMP 08/26/2018  BMI 35.24 kg/m2      Physical Exam:  Gen: alert and awake, no acute distress  HEENT: EOM intact  CV: RRR, normal S1 and S2, no murmurs or gallops  Resp: clear to " ascultation all lung fields bilaterally   Abd- soft, nontender, nondistended  Neurologic- normal muscle strength and mental status    Breast Exam:  Breast: without visible skin changes. No dimpling or lesions seen.   Breasts supple, non-tender with palpation, no dominant mass, nodularity, or nipple discharge noted bilaterally. Axillary nodes negative.      Pelvic Exam  EG/BUS: Normal genital architecture without lesions, erythema or abnormal secretions Bartholin's, Urethra, [place]'s normal             Urethral meatus: normal   Urethra: no masses, tenderness, or scarring   Bladder: bladder tenderness,  no masses  Vagina: moist, pink, rugae with physiologic discharge  secretions  Cervix:  no lesions, deeply located  Uterus: midposition, and small, smooth, firm, mobile w/o pain  Adnexa: Within normal limits and No masses, nodularity, tenderness  Rectum: anus normal             Data:   All laboratory data reviewed    Lab: UA pending      Imaging: none            Answers for HPI/ROS submitted by the patient on 9/4/2018   LANDEN 7 TOTAL SCORE: 1  PHQ-2 Score: 0  General Symptoms: No  Skin Symptoms: No  HENT Symptoms: No  EYE SYMPTOMS: No  HEART SYMPTOMS: No  LUNG SYMPTOMS: No  INTESTINAL SYMPTOMS: Yes  URINARY SYMPTOMS: Yes  GYNECOLOGIC SYMPTOMS: Yes  BREAST SYMPTOMS: No  SKELETAL SYMPTOMS: Yes  BLOOD SYMPTOMS: No  NERVOUS SYSTEM SYMPTOMS: Yes  MENTAL HEALTH SYMPTOMS: Yes  Heart burn or indigestion: No  Nausea: No  Vomiting: No  Abdominal pain: No  Bloating: Yes  Constipation: No  Diarrhea: No  Blood in stool: No  Black stools: No  Rectal or Anal pain: No  Fecal incontinence: No  Yellowing of skin or eyes: No  Vomit with blood: No  Change in stools: No  Trouble holding urine or incontinence: Yes  Pain or burning: Yes  Trouble starting or stopping: No  Increased frequency of urination: Yes  Blood in urine: No  Decreased frequency of urination: No  Frequent nighttime urination: Yes  Flank pain: No  Difficulty emptying bladder:  No  Back pain: Yes  Muscle aches: Yes  Neck pain: No  Swollen joints: No  Joint pain: No  Bone pain: No  Muscle cramps: Yes  Muscle weakness: No  Joint stiffness: No  Bone fracture: No  Trouble with coordination: No  Dizziness or trouble with balance: Yes  Fainting or black-out spells: No  Memory loss: No  Headache: Yes  Seizures: No  Speech problems: No  Tingling: No  Tremor: No  Weakness: No  Difficulty walking: No  Paralysis: No  Numbness: No  Bleeding or spotting between periods: No  Heavy or painful periods: Yes  Irregular periods: No  Vaginal discharge: No  Hot flashes: No  Vaginal dryness: Yes  Genital ulcers: No  Reduced libido: Yes  Painful intercourse: No  Difficulty with sexual arousal: No  Post-menopausal bleeding: No  Nervous or Anxious: No  Depression: No  Trouble sleeping: Yes  Trouble thinking or concentrating: No  Mood changes: Yes  Panic attacks: No    Pap    Again, thank you for allowing me to participate in the care of your patient.      Sincerely,    Nel Saba MD

## 2018-09-06 ENCOUNTER — TELEPHONE (OUTPATIENT)
Dept: OBGYN | Facility: CLINIC | Age: 48
End: 2018-09-06

## 2018-09-06 DIAGNOSIS — E78.5 HYPERLIPIDEMIA LDL GOAL <100: ICD-10-CM

## 2018-09-06 LAB
BACTERIA SPEC CULT: NO GROWTH
Lab: NORMAL
SPECIMEN SOURCE: NORMAL

## 2018-09-06 RX ORDER — ATORVASTATIN CALCIUM 20 MG/1
20 TABLET, FILM COATED ORAL DAILY
Qty: 90 TABLET | Refills: 3 | Status: SHIPPED | OUTPATIENT
Start: 2018-09-06 | End: 2019-10-10

## 2018-09-06 ASSESSMENT — PATIENT HEALTH QUESTIONNAIRE - PHQ9: SUM OF ALL RESPONSES TO PHQ QUESTIONS 1-9: 1

## 2018-09-06 ASSESSMENT — ANXIETY QUESTIONNAIRES
GAD7 TOTAL SCORE: 1
GAD7 TOTAL SCORE: 1

## 2018-09-06 NOTE — TELEPHONE ENCOUNTER
Spoke to Adriana who is calling to have her Lipitor increased to 20 mg daily d/t lipid results from yesterday (9/5/18).  She said at her appt with Dr Colon yesterday they did discuss the possibility/need to increase the dose to 20 mg.  Adriana just picked up a refill of the 10 mg so will double up and take 20 mg daily until gone and requesting a new prescription for the 20 mg going forward.    I will route 20 mg new prescription to Dr Colon to approve.Pt indicated understanding and agreed with plan.

## 2018-09-06 NOTE — TELEPHONE ENCOUNTER
----- Message from Ghazala Layton RN sent at 9/5/2018  4:42 PM CDT -----  Regarding: Returning call to Isaiah re: adjusting cholesterol medicine  Pt called 9/5 at 1618.    KATHY Pond September 5, 2018 4:42 PM

## 2018-09-07 LAB
COPATH REPORT: NORMAL
PAP: NORMAL

## 2018-09-11 LAB
FINAL DIAGNOSIS: NORMAL
HPV HR 12 DNA CVX QL NAA+PROBE: NEGATIVE
HPV16 DNA SPEC QL NAA+PROBE: NEGATIVE
HPV18 DNA SPEC QL NAA+PROBE: NEGATIVE
SPECIMEN DESCRIPTION: NORMAL
SPECIMEN SOURCE CVX/VAG CYTO: NORMAL

## 2018-09-27 ENCOUNTER — OFFICE VISIT (OUTPATIENT)
Dept: UROLOGY | Facility: CLINIC | Age: 48
End: 2018-09-27
Attending: OBSTETRICS & GYNECOLOGY
Payer: COMMERCIAL

## 2018-09-27 VITALS
HEART RATE: 77 BPM | HEIGHT: 67 IN | SYSTOLIC BLOOD PRESSURE: 133 MMHG | BODY MASS INDEX: 32.65 KG/M2 | WEIGHT: 208 LBS | DIASTOLIC BLOOD PRESSURE: 84 MMHG

## 2018-09-27 DIAGNOSIS — N39.41 URGE INCONTINENCE: Primary | ICD-10-CM

## 2018-09-27 DIAGNOSIS — N81.6 RECTOCELE: ICD-10-CM

## 2018-09-27 RX ORDER — TOLTERODINE 2 MG/1
2 CAPSULE, EXTENDED RELEASE ORAL DAILY
Qty: 90 CAPSULE | Refills: 1 | Status: SHIPPED | OUTPATIENT
Start: 2018-09-27 | End: 2018-12-18 | Stop reason: DRUGHIGH

## 2018-09-27 ASSESSMENT — PAIN SCALES - GENERAL: PAINLEVEL: NO PAIN (0)

## 2018-09-27 NOTE — LETTER
2018       RE: Adriana Lucia  [address] [facility] [place] [address]     Dear Colleague,    Thank you for referring your patient, Adriana Lucia, to the [facility] at [facility]. Please see a copy of my visit note below.    2018    Referring Provider: Referred Self, MD  No address on file    Primary Care Provider: No Ref-Primary, Physician    CC: urinary urgency, frequency, and leakage    HPI:  Adriana Lucia is a 48 year old  female who presents for evaluation of her pelvic floor symptoms. She has been experiencing bladder symptoms for approximately 6 years. The symptoms started gradually and have been largely the same over that time.    She describes symptoms of urinary frequency. She is going to the bathroom 7-8 times per day, especially in the evening and at night. She describes having to get up to go to the bathroom 3 times during a dinner. Her problems get worse with increased fluid intake. She deals with urinary frequency on a daily basis. When she does void, she is voiding normal to large volumes. She reports waking 1-2 times per night to void.    She also describes symptoms of urinary urgency. She does not experience urge symptoms daily, but does experience them at least weekly. She is sometimes able to hold her urine when she experiences urgency, but will often void normal to large volumes of urine.    Finally, she describes symptoms of urinary leakage associated with her urgency symptoms. She has accidents a couple times a week. When she goes out for long periods of time, she makes sure to wear a pad in case of leakage.    She denies any pain associated with any of her symptoms. After voiding, she will sometimes feel a slight ache, but does not have any pain with urination. She also reports some vaginal dryness which she associates with possible menopausal symptoms.    She has been keeping a  bladder journal since her visit with Dr. Saba on 9/5 consistent with her symptoms described.    Prolapse:  Do you feel a vaginal bulge? No         Pressure? No  Do you have to place your fingers in the vagina or in the rectum to have a bowel movement? No  Impact to quality of life? N/A    Stress Incontinence:  Do you leak urine with cough, sneeze, exercise? No  How often do you leak with cough, sneeze, exercise? No  How much do you usually leak? N/A  Do you wear a pad? N/A If so; N/A  Impact to quality of life? N/A    Urge Incontinence:  Do you often get sudden urges to urinate? Yes  How often do have urges? Weekly  If so, do you leak with these urges? Yes  How much do you usually leak? Soak  Impact to quality of life? Moderate    Voids/day: 8  Nocturia: 1-2  Fluid intake: 32oz  Caffeine: 1 double shot americano per day    Urinating:  Difficulty starting urination or strain to void? No  Weak or intermittent stream? Yes  Incomplete emptying or dribbling? Yes  Pain or burning with urination? No  Any blood in your urine? No    GI:  Constipation? No  Frequency stools Daily    Straining for stools No  Stool consistency Normal    Ever leak stool (Accidental Bowel Leakage)? No      If so, how often?               N/A      If so, do you leak?             N/A      Soiling without sensation? N/A  History of irritable bowel or Crohn's? No    Sexual/Pain:  Are you currently having sex? Yes  Pain with sex?   None  Sexual Partner: Male  Do any of these symptoms interfere with sex? N/A  Impact to quality of life? N/A    Prior therapy:  Ever done pelvic floor physical therapy? No  Trial of medication? No  Have you ever tried a pessary? No    Medical History:  Do you have?   High Cholesterol? Yes  Diabetes? No  High Blood pressure? Yes  Recurrent UTIs? No  Sleep Apnea? No  Other medical problems: None    Surgical History:    Hysterectomy? No  Bladder Surgery? No   Other? C section 2015    OB/Gyn History:  Pregnancies?  4  Deliveries? 1  Vaginal 0  Section 1 ()  Current birth control? None currently  Periods? Regular  When was the first day of your last period? 2018  Last Pap smear? 2014 Any abnormal? 1 abnormal in her 20s with normal colposcopy  Last mammogram? 2018  Last colonoscopy? N/A    Medications/Vitamins/Supplements:   Current Outpatient Prescriptions   Medication     atorvastatin (LIPITOR) 20 MG tablet     enalapril (VASOTEC) 20 MG tablet     tolterodine (DETROL LA) 2 MG 24 hr capsule     VITAMIN D, CHOLECALCIFEROL, PO     CALCIUM PO     SUMAtriptan (IMITREX) 50 MG tablet     No current facility-administered medications for this visit.      Facility-Administered Medications Ordered in Other Visits   Medication     bupivacaine 0.25 % - EPINEPHrine 1:200,000 injection     Drug Allergies: No known allergies  Latex Allergy: No  Iodine Allergy No    Family History: (list relationship and age at diagnosis)  Breast cancer? No  Ovarian cancer? No   Colon cancer? No  Other? None    Social History:  Marital status: Domestic partner  Do you/ have you ever smoke(d)  cigarettes? No  Drink more than 1 alcoholic beverage a day?  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 No  Unusual headaches? Yes  Leg pain w/ walking/exercise? No  Easy bruising? No  Difficulty breathing w/ walking/exercise? No  Problems with vision? No  Dizziness, falls, or fainting? Occasional dizziness  Excessive bleeding from cuts, gums, surgery? No  Other: None    Past Medical History:   Diagnosis Date     HSV (herpes simplex virus) infection      Hypertension        Past Surgical History:   Procedure Laterality Date      SECTION N/A 2015    Procedure:  SECTION;  Surgeon: Meredith Clark MD;  [facility]: [facility]             Social History     Social History     Marital status: Single     Spouse name: Tai  Beni     Number of children: 0     Years of education: N/A     Occupational History     Pharmacy Tech Unknown     Social History Main Topics     Smoking status: Never Smoker     Smokeless tobacco: Former User     Quit date: 8/8/2014     Alcohol use 1.2 oz/week     2 Shots of liquor per week     Drug use: No     Sexual activity: Yes     Partners: Male     Birth control/ protection: None     Other Topics Concern      Service No     Blood Transfusions No     Caffeine Concern No     2 cups a coffee per day     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 Considering biking     Bike Helmet Yes     Seat Belt Yes     Self-Exams Yes     Social History Narrative    How much exercise per week? 3-4 times weekly    How much calcium per day? Diet - yogurt daily       How much caffeine per day? 2 cups    How much vitamin D per day? 5000 IU plus diet    Do you/your family wear seatbelts?  Yes    Do you/your family use safety helmets? n/a    Do you/your family use sunscreen? Yes    Do you/your family keep firearms in the home? Yes    Do you/your family have a smoke detector(s)? Yes        Do you feel safe in your home? Yes    Has anyone ever touched you in an unwanted manner? No    Margoth Coronado LPN    6/20/14               Family History   Problem Relation Age of Onset     Asthma Father      Allergies Father      Lipids Father      Cerebrovascular Disease Father      Hypertension Father      Hyperlipidemia Father      Diabetes Father      Obesity Father      Alcohol/Drug Paternal Grandfather      Substance Abuse Paternal Grandfather      Allergies Mother      Lipids Mother      Thyroid Disease Mother      Other - See Comments Mother      DVT during preg      Hypertension Mother      Hyperlipidemia Mother   "    Anxiety Disorder Mother      Depression Mother      Obesity Mother      Lipids Maternal Grandmother      Hyperlipidemia Maternal Grandmother      Obesity Maternal Grandmother      Thyroid Disease Maternal Grandfather      Obesity Maternal Grandfather      Cancer No family hx of      no skin cancer       ROS negative other than listed in HPI    No Known Allergies    Current Outpatient Prescriptions   Medication     atorvastatin (LIPITOR) 20 MG tablet     enalapril (VASOTEC) 20 MG tablet     tolterodine (DETROL LA) 2 MG 24 hr capsule     VITAMIN D, CHOLECALCIFEROL, PO     CALCIUM PO     SUMAtriptan (IMITREX) 50 MG tablet     No current facility-administered medications for this visit.      Facility-Administered Medications Ordered in Other Visits   Medication     bupivacaine 0.25 % - EPINEPHrine 1:200,000 injection       /84  Pulse 77  Ht 1.702 m (5' 7\")  Wt 94.3 kg (208 lb)  LMP 09/24/2018  BMI 32.58 kg/m2 Patient's last menstrual period was 09/24/2018. Body mass index is 32.58 kg/(m^2).  Adriana is alert, comfortable in no acute distress, non-labored breathing.   Abdomen is soft, non-tender, non-distended.    Normal external female genitalia. The urethra was normal appearing with no lesions or erythema.  She has good anterior support on supine strain with descent of cervix and posterior vaginal wall.  Speculum and bimanual exam are remarkable for posterior wall prolapse to the hymenal ring.  3/5 kegels.    POPQ  Aa -2  Ba -2  C -3  D -8  GH 4  PB 3  TVL 10  Ap 0  Bp 0    neg SST  VOID 200 ml  PVR 0 mL by bladder scan    9/5 UA revealed no evidence of UTI, no culture growth    A/P: Adriana Lucia is a 48 year old F with urgency, urge incontinence, and posterior wall prolapse.    Discussed with patient her symptoms and her diagnosis of urgency and urge incontinence. Treatment options to include:  1) observation with f/u in 6 -12 months  2) pelvic floor physical therapy and bladder training  3) " anti-cholinergic medications or myrbetriq  4) BOTOX injections  5) Interstim    Given the frequency of her symptoms, we discussed pursuing pelvic floor physical therapy/bladder training in conjunction with medical management. We discussed starting detrol/tolterodine. Instructed patient on side effects of medication and patient was agreeable to start.    Also discussed with patient her posterior prolapse and that there is no need to undergo any treatment for asymptomatic prolapse. We discussed that she should contact our office if she starts to notice symptoms from her posterior prolapse.    Patient was given the [employer] POP-Q interactive handout demonstrating her prolapse, as well as a resource sheet on PFPT/bladder training from [employer].    Plan to follow up in 3 months to evaluate symptoms and need for further management.    A total of 60 minutes were spent with the patient today, > 50% in counseling and coordination of care    Eyad Hawk MD  Professor, OB/GYN  Urogynecologist    CC  Patient Care Team:  No Ref-Primary, Physician as PCP - General  Humera Becerra MD as MD (OB/Gyn)  Zeynep Stauffer PA-C as Physician Assistant (Physician Assistant)  Nadira Colon MD as MD (Internal Medicine)  SELF, REFERRED

## 2018-09-27 NOTE — NURSING NOTE
Patient tested negative for urinary stress incontinence  Voided 200 ml   Bladder scan  Showed 0 ml   Residual.

## 2018-09-27 NOTE — PROGRESS NOTES
2018    Referring Provider: Referred Self, MD  No address on file    Primary Care Provider: No Ref-Primary, Physician    CC: urinary urgency, frequency, and leakage    HPI:  Adriana Lucia is a 48 year old  female who presents for evaluation of her pelvic floor symptoms. She has been experiencing bladder symptoms for approximately 6 years. The symptoms started gradually and have been largely the same over that time.    She describes symptoms of urinary frequency. She is going to the bathroom 7-8 times per day, especially in the evening and at night. She describes having to get up to go to the bathroom 3 times during a dinner. Her problems get worse with increased fluid intake. She deals with urinary frequency on a daily basis. When she does void, she is voiding normal to large volumes. She reports waking 1-2 times per night to void.    She also describes symptoms of urinary urgency. She does not experience urge symptoms daily, but does experience them at least weekly. She is sometimes able to hold her urine when she experiences urgency, but will often void normal to large volumes of urine.    Finally, she describes symptoms of urinary leakage associated with her urgency symptoms. She has accidents a couple times a week. When she goes out for long periods of time, she makes sure to wear a pad in case of leakage.    She denies any pain associated with any of her symptoms. After voiding, she will sometimes feel a slight ache, but does not have any pain with urination. She also reports some vaginal dryness which she associates with possible menopausal symptoms.    She has been keeping a bladder journal since her visit with Dr. Saba on  consistent with her symptoms described.    Prolapse:  Do you feel a vaginal bulge? No         Pressure? No  Do you have to place your fingers in the vagina or in the rectum to have a bowel movement? No  Impact to quality of life? N/A    Stress  Incontinence:  Do you leak urine with cough, sneeze, exercise? No  How often do you leak with cough, sneeze, exercise? No  How much do you usually leak? N/A  Do you wear a pad? N/A If so; N/A  Impact to quality of life? N/A    Urge Incontinence:  Do you often get sudden urges to urinate? Yes  How often do have urges? Weekly  If so, do you leak with these urges? Yes  How much do you usually leak? Soak  Impact to quality of life? Moderate    Voids/day: 8  Nocturia: 1-2  Fluid intake: 32oz  Caffeine: 1 double shot americano per day    Urinating:  Difficulty starting urination or strain to void? No  Weak or intermittent stream? Yes  Incomplete emptying or dribbling? Yes  Pain or burning with urination? No  Any blood in your urine? No    GI:  Constipation? No  Frequency stools Daily    Straining for stools No  Stool consistency Normal    Ever leak stool (Accidental Bowel Leakage)? No      If so, how often?               N/A      If so, do you leak?             N/A      Soiling without sensation? N/A  History of irritable bowel or Crohn's? No    Sexual/Pain:  Are you currently having sex? Yes  Pain with sex?   None  Sexual Partner: Male  Do any of these symptoms interfere with sex? N/A  Impact to quality of life? N/A    Prior therapy:  Ever done pelvic floor physical therapy? No  Trial of medication? No  Have you ever tried a pessary? No    Medical History:  Do you have?   High Cholesterol? Yes  Diabetes? No  High Blood pressure? Yes  Recurrent UTIs? No  Sleep Apnea? No  Other medical problems: None    Surgical History:    Hysterectomy? No  Bladder Surgery? No   Other? C section     OB/Gyn History:  Pregnancies? 4  Deliveries? 1  Vaginal 0  Section 1 ()  Current birth control? None currently  Periods? Regular  When was the first day of your last period? 2018  Last Pap smear? 2014 Any abnormal? 1 abnormal in her 20s with normal colposcopy  Last mammogram? 2018  Last colonoscopy?  N/A    Medications/Vitamins/Supplements:   Current Outpatient Prescriptions   Medication     atorvastatin (LIPITOR) 20 MG tablet     enalapril (VASOTEC) 20 MG tablet     tolterodine (DETROL LA) 2 MG 24 hr capsule     VITAMIN D, CHOLECALCIFEROL, PO     CALCIUM PO     SUMAtriptan (IMITREX) 50 MG tablet     No current facility-administered medications for this visit.      Facility-Administered Medications Ordered in Other Visits   Medication     bupivacaine 0.25 % - EPINEPHrine 1:200,000 injection     Drug Allergies: No known allergies  Latex Allergy: No  Iodine Allergy No    Family History: (list relationship and age at diagnosis)  Breast cancer? No  Ovarian cancer? No   Colon cancer? No  Other? None    Social History:  Marital status: Domestic partner  Do you/ have you ever smoke(d)  cigarettes? No  Drink more than 1 alcoholic beverage a day?  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 No  Unusual headaches? Yes  Leg pain w/ walking/exercise? No  Easy bruising? No  Difficulty breathing w/ walking/exercise? No  Problems with vision? No  Dizziness, falls, or fainting? Occasional dizziness  Excessive bleeding from cuts, gums, surgery? No  Other: None    Past Medical History:   Diagnosis Date     HSV (herpes simplex virus) infection      Hypertension        Past Surgical History:   Procedure Laterality Date      SECTION N/A 2015    Procedure:  SECTION;  Surgeon: Meredith Clark MD;  [facility]             Social History     Social History     Marital status: Single     Spouse name: Tai Bazan     Number of children: 0     Years of education: N/A     Occupational History     Pharmacy Tech Unknown     Social History Main Topics     Smoking status: Never Smoker     Smokeless tobacco: Former User     Quit date: 2014     Alcohol use 1.2 oz/week     2 Shots of liquor per week      Drug use: No     Sexual activity: Yes     Partners: Male     Birth control/ protection: None     Other Topics Concern      Service No     Blood Transfusions No     Caffeine Concern No     2 cups a coffee per day     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 Considering biking     Bike Helmet Yes     Seat Belt Yes     Self-Exams Yes     Social History Narrative    How much exercise per week? 3-4 times weekly    How much calcium per day? Diet - yogurt daily       How much caffeine per day? 2 cups    How much vitamin D per day? 5000 IU plus diet    Do you/your family wear seatbelts?  Yes    Do you/your family use safety helmets? n/a    Do you/your family use sunscreen? Yes    Do you/your family keep firearms in the home? Yes    Do you/your family have a smoke detector(s)? Yes        Do you feel safe in your home? Yes    Has anyone ever touched you in an unwanted manner? No    Margoth Coronado LPN    6/20/14               Family History   Problem Relation Age of Onset     Asthma Father      Allergies Father      Lipids Father      Cerebrovascular Disease Father      Hypertension Father      Hyperlipidemia Father      Diabetes Father      Obesity Father      Alcohol/Drug Paternal Grandfather      Substance Abuse Paternal Grandfather      Allergies Mother      Lipids Mother      Thyroid Disease Mother      Other - See Comments Mother      DVT during preg      Hypertension Mother      Hyperlipidemia Mother      Anxiety Disorder Mother      Depression Mother      Obesity Mother      Lipids Maternal Grandmother      Hyperlipidemia Maternal Grandmother      Obesity Maternal Grandmother      Thyroid Disease Maternal Grandfather      Obesity Maternal Grandfather      Cancer No family hx of      no skin  "cancer       ROS negative other than listed in HPI    No Known Allergies    Current Outpatient Prescriptions   Medication     atorvastatin (LIPITOR) 20 MG tablet     enalapril (VASOTEC) 20 MG tablet     tolterodine (DETROL LA) 2 MG 24 hr capsule     VITAMIN D, CHOLECALCIFEROL, PO     CALCIUM PO     SUMAtriptan (IMITREX) 50 MG tablet     No current facility-administered medications for this visit.      Facility-Administered Medications Ordered in Other Visits   Medication     bupivacaine 0.25 % - EPINEPHrine 1:200,000 injection       /84  Pulse 77  Ht 1.702 m (5' 7\")  Wt 94.3 kg (208 lb)  LMP 09/24/2018  BMI 32.58 kg/m2 Patient's last menstrual period was 09/24/2018. Body mass index is 32.58 kg/(m^2).  Adriana is alert, comfortable in no acute distress, non-labored breathing.   Abdomen is soft, non-tender, non-distended.    Normal external female genitalia. The urethra was normal appearing with no lesions or erythema.  She has good anterior support on supine strain with descent of cervix and posterior vaginal wall.  Speculum and bimanual exam are remarkable for posterior wall prolapse to the hymenal ring.  3/5 kegels.    POPQ  Aa -2  Ba -2  C -3  D -8  GH 4  PB 3  TVL 10  Ap 0  Bp 0    neg SST  VOID 200 ml  PVR 0 mL by bladder scan    9/5 UA revealed no evidence of UTI, no culture growth    A/P: Adriana Lucia is a 48 year old F with urgency, urge incontinence, and posterior wall prolapse.    Discussed with patient her symptoms and her diagnosis of urgency and urge incontinence. Treatment options to include:  1) observation with f/u in 6 -12 months  2) pelvic floor physical therapy and bladder training  3) anti-cholinergic medications or myrbetriq  4) BOTOX injections  5) Interstim    Given the frequency of her symptoms, we discussed pursuing pelvic floor physical therapy/bladder training in conjunction with medical management. We discussed starting detrol/tolterodine. Instructed patient on side effects " of medication and patient was agreeable to start.    Also discussed with patient her posterior prolapse and that there is no need to undergo any treatment for asymptomatic prolapse. We discussed that she should contact our office if she starts to notice symptoms from her posterior prolapse.    Patient was given the AU[employer] POP-Q interactive handout demonstrating her prolapse, as well as a resource sheet on PFPT/bladder training from [employer].    Plan to follow up in 3 months to evaluate symptoms and need for further management.    A total of 60 minutes were spent with the patient today, > 50% in counseling and coordination of care    Eyad Hawk MD  Professor, OB/GYN  Urogynecologist  CC  Patient Care Team:  No Ref-Primary, Physician as PCP - General  Humera Becerra MD as MD (OB/Gyn)  Zeynep Stauffer PA-C as Physician Assistant (Physician Assistant)  Nadira Colon MD as MD (Internal Medicine)  SELF, REFERRED

## 2018-09-27 NOTE — MR AVS SNAPSHOT
After Visit Summary   9/27/2018    Adriana Lucia    MRN: [id number]           Patient Information     Date Of Birth          1970        Visit Information        Provider Department      9/27/2018 10:00 AM Eyad Hawk MD [facility]        Today's Diagnoses     Urge incontinence    -  1      Care Instructions    Follow up with Dr. Hawk in 3 months          Follow-ups after your visit        Additional Services     [facility], [facility] CH[facility]RACT[facility] REFERRAL       Physical Therapy, Hand Therapy and Chiropractic Care are available through:  *[facility]  *Hand Therapy (Occupational Therapy or Physical Therapy)  *[facility]    Call one number to schedule at any of the above locations: [phone].    Physical therapy, Hand therapy and/or Chiropractic care has been recommended by your physician as an excellent treatment option to reduce pain and help people return to normal activities, including sports.  Therapy and/or chiropractic care services are a great complement or alternative to expensive and invasive surgery, injections, or long-term use of prescription medications. The primary goal is to identify the underlying problem and provide you the tools to manage your condition on your own.     Please be aware that coverage of these services is subject to the terms and limitations of your health insurance plan.  Call member services at your health plan with any benefit or coverage questions.      Please bring the following to your appointment:  *Your personal calendar for scheduling future appointments  *Comfortable clothing                  Your next 10 appointments already scheduled     Dec 13, 2018 10:00 AM CST   Return Urogyn with Eyad Hawk MD   [facility] ([facility])    [facility]  [address] [facility] [address]  [facility] [address] [phone]         "   Please call [facility] , [phone], with any questions or concerns you may have regarding your appointment              Future tests that were ordered for you today     Open Future Orders        Priority Expected Expires Ordered    [facility] REFERRAL Routine  9/27/2019 9/27/2018            Who to contact     Please call your clinic at [phone] to:    Ask questions about your health    Make or cancel appointments    Discuss your medicines    Learn about your test results    Speak to your doctor            Additional Information About Your Visit        Vobile Information     [employer] gives you secure access to your electronic health record. If you see a primary care provider, you can also send messages to your care team and make appointments. If you have questions, please call your primary care clinic.  If you do not have a primary care provider, please call [phone] and they will assist you.      [employer] is an electronic gateway that provides easy, online access to your medical records. With [employer], you can request a clinic appointment, read your test results, renew a prescription or communicate with your care team.     To access your existing account, please contact your [facility] or call [phone] for assistance.        Care EveryWhere ID     This is your Care EveryWhere ID. This could be used by other organizations to access your [place] medical records  [id number]        Your Vitals Were     Pulse Height Last Period BMI (Body Mass Index)          77 1.702 m (5' 7\") 09/24/2018 32.58 kg/m2         Blood Pressure from Last 3 Encounters:   09/27/18 133/84   09/05/18 112/76   09/05/18 112/76    Weight from Last 3 Encounters:   09/27/18 94.3 kg (208 lb)   09/05/18 102.1 kg (225 lb)   09/05/18 102.1 kg (225 lb)                 Today's Medication Changes          These changes are accurate as of 9/27/18 10:57 AM.  If you have any " questions, ask your nurse or doctor.               Start taking these medicines.        Dose/Directions    tolterodine 2 MG 24 hr capsule   Commonly known as:  DETROL LA   Used for:  Urge incontinence   Started by:  Eyad Hawk MD        Dose:  2 mg   Take 1 capsule (2 mg) by mouth daily   Quantity:  90 capsule   Refills:  1            Where to get your medicines      These medications were sent to [id number] - [place], [place] - [address]  [address] [facility] [address]     Phone:  [phone]     tolterodine 2 MG 24 hr capsule                Primary Care Provider Fax #    Physician No Ref-Primary [phone]       No address on file        Equal Access to Services     [facility]: Hadii ezekiel Ndiaye, waaxda lufarzanehadaha, qaybta kaalmada adecarlosyada, beverly wright . So [facility] [phone].    ATENCIÓN: Si habla español, tiene a bolanos disposición servicios gratuitos de asistencia lingüística. [facility] [phone].    We comply with applicable federal civil rights laws and [place] laws. We do not discriminate on the basis of race, color, national origin, age, disability, sex, sexual orientation, or gender identity.            Thank you!     Thank you for choosing [facility]  for your care. Our goal is always to provide you with excellent care. Hearing back from our patients is one way we can continue to improve our services. Please take a few minutes to complete the written survey that you may receive in the mail after your visit with us. Thank you!             Your Updated Medication List - Protect others around you: Learn how to safely use, store and throw away your medicines at www.disposemymeds.org.          This list is accurate as of 9/27/18 10:57 AM.  Always use your most recent med list.                   Brand Name Dispense Instructions for use Diagnosis    atorvastatin 20 MG tablet    LIPITOR    90 tablet    Take 1  tablet (20 mg) by mouth daily    Hyperlipidemia LDL goal <100       CALCIUM PO           enalapril 20 MG tablet    VASOTEC    90 tablet    Take 1 tablet (20 mg) by mouth daily    Essential hypertension, benign       SUMAtriptan 50 MG tablet    IMITREX    18 tablet    Take 1 tablet (50 mg) by mouth at onset of headache for migraine May repeat dose in 2 hours.  Do not exceed 200 mg in 24 hours    Migraine with aura and without status migrainosus, not intractable       tolterodine 2 MG 24 hr capsule    DETROL LA    90 capsule    Take 1 capsule (2 mg) by mouth daily    Urge incontinence       VITAMIN D (CHOLECALCIFEROL) PO      Take 5,000 Units by mouth every other day

## 2018-10-02 ENCOUNTER — MEDICAL CORRESPONDENCE (OUTPATIENT)
Dept: HEALTH INFORMATION MANAGEMENT | Facility: CLINIC | Age: 48
End: 2018-10-02

## 2018-10-17 ENCOUNTER — THERAPY VISIT (OUTPATIENT)
Dept: PHYSICAL THERAPY | Facility: CLINIC | Age: 48
End: 2018-10-17
Payer: COMMERCIAL

## 2018-10-17 DIAGNOSIS — M99.05 SOMATIC DYSFUNCTION OF PELVIC REGION: Primary | ICD-10-CM

## 2018-10-17 DIAGNOSIS — N32.81 URGENCY-FREQUENCY SYNDROME: ICD-10-CM

## 2018-10-17 DIAGNOSIS — N39.41 URGE INCONTINENCE: ICD-10-CM

## 2018-10-17 DIAGNOSIS — N39.41 URGE INCONTINENCE OF URINE: ICD-10-CM

## 2018-10-17 PROCEDURE — 97530 THERAPEUTIC ACTIVITIES: CPT | Mod: GP | Performed by: PHYSICAL THERAPIST

## 2018-10-17 PROCEDURE — 97112 NEUROMUSCULAR REEDUCATION: CPT | Mod: GP | Performed by: PHYSICAL THERAPIST

## 2018-10-17 PROCEDURE — 97161 PT EVAL LOW COMPLEX 20 MIN: CPT | Mod: GP | Performed by: PHYSICAL THERAPIST

## 2018-10-17 NOTE — PROGRESS NOTES
[facility] [place]  System  Physical Exam  General   [facility]      Physical Therapy Initial Examination/Evaluation 2018   Adriana Lucia is a 48 year old female referred to physical therapy by Dr. Hawk for treatment of Pelvic floor dysfunction; urgency/frequency  with Precautions/Restrictions/MD instructions E&T   Therapist Assessment:   Clinical Impression: Pt presents to [facility] with primary complaint of urgency/frequency syndrome.  Per clinical examination, pt with decreased strength in pelvic floor.  Rectocele also noted with cough and valsalva. Pt with overall weakness in core and proximal muscles.   Pt will benefit from skilled physical therapy for strengthening and stabilization program as well as education on normal voiding patterns and optimal hydration for improved bladder health.      Subjective: Pt reports that she has always had to go to the bathroom frequently. Pt reports increased gas after .   DOI/onset: MD order 2018  Mechanism of injury: NA   DOS NA   Related PMH: chronic frequency  Previous treatment: none   Imaging: NA   Chief Complaint: Urgency/Frequency   Pain: rest 0  /10, activity 0/10 Described as: NA Alleviated by: NA Exacerbated by: NA Progression of symptoms since initial onset: Worsening over the past 10-15 years Time of day when pain is worse: none noted   Sleeping: Waking 1-3x/ night to urinate   Social history: 3 year old son; Lives with partner/son's father  Occupation: Pharmacy Tech Job duties: Computer work, prolonged standing    Current HEP/exercise regimen: nothing    Patient's goals are Decrease frequency of urination    Other pertinent PMH: htn, migraines, overweight General health as reported by patient: Fair-Good   Return to MD: 2018       Urination:  Do you leak on the way to the bathroom or with a strong urge to void? Yes    Do you leak with cough,sneeze, jumping, running?No   Any other activities that cause leaking? No   Do you  "have triggers that make you feel you can't wait to go to the bathroom? Yes   what are they: water running.  Type of pad and number used per day? Once in a while  When you leak what is the amount? Large    How long can you delay the need to urinate? 15-20 minutes.   How many times do you get up to urinate at night? 1-3    Can you stop the flow of urine when on the toilet? Yes  Is the volume of urine passed usually: average. (8sec rule=  250ml with average bladder storing  400-600ml)    Do you strain to pass urine? No  Do you have a slow or hesitant urinary stream? No  Do you have difficulty initiating the urine stream? No  Is urination painful?  No    How many bladder infections have you had in last 12 months? 0    Fluid intake(one glass is 8oz or one cup) 3 glasses/day, 1 caffinated glasses/day  \"depends\"  alcohol glasses/day.    Bowel habits:  Frequency of bowel movements? 1 times a day  Consistancy of stool? soft formed  Do you ignore the urge to defecate? No  Do you strain to pass stool? No    Pelvic Pain:  Do you have any pelvic pain with intercourse, exams, use of tampons? No  Is initial penetration during intercourse painful? No  Is deeper penetration painful? Yes  Do you use lubricant?   Yes What kind? Water-based      Given birth? Yes Any complications? Pushing for 4 hours followed by emergency   # of vaginal delieveries? 0   # of C-sections? 1  # of episiotomies? 0.  Are you sexually active?Yes  Have you ever been worried for your physical safety? No  Any abdominal or pelvic surgeries?    Are you having any regular exercise?No  Have you practiced the PF(kegel) exercises for 4 or more weeks? No      LUMBAR ROM  Flexion: WNL  Extension: WNL  Sidebendin% ROM with increased tightness to the R  Rotation: 75% with good feeling of stretch     MUSCLE PERFORMANCE    Baseline PF tone:hypo with areas of trigger points in deeper layers  PF Tone with cough: bulge in rectocele  Valsalva: bulge  PF " Response quality: moderate  PF Power: Center: 2   Endurance: Maximum contraction in seconds: 7 before compensation from adductors  # of endurance contractions before fatigue: NT  Quick contraction repetitions prior to fatigue: 6.  Specificity/accessory muscles: Abdominals, adductors, glutes  Prolapse: Cyctocele/Rectocele/Uterine ndgndrndanddndend:nd nd2nd rectocele     MMT 10/17/2018 Left Right    Hip Flexion  4+/5 4+/5   Hip Abduction  3+/5 4-/5   Hip Extension  4-/5 4-/5           PALPATION: Increased hypertonicity on the L side and in deeper layers of PF    Therapeutic Activity (20 min): Today's session consisted of education regarding pelvic floor muscle anatomy, normal bladder function, urge suppression techniques and/or relaxation techniques as indicated, and instruction to bring in bladder diary that she completed for Dr. Hawk.  Pt was instructed in the pathoanatomy of the pelvic floor utilizing pelvic model.  We discussed what pelvic floor physical therapy is, components of exam, and typical patient progression.  Pt was told that she was in control of exam progression, and if at any time was uncomfortable and wished to discontinue we could.    NMR (25 mins)  Biofeedback unit used to provide visualization of PF activation in multiple body positions including supine, sidelying, & sitting. Education provided on the importance of relaxation and control of pelvic floor including isolation of pelvic floor muscles.  Manual cues provided for pelvic floor contraction, as pt tending to have more control along ischiocavernosus but decreased muscle contraction along bulbocavernosus. Initiated education on proper body mechanics for lifting and the importance of core strength. Discussed activation of TA and performed contraction in both supine and sitting working up to 20-30s holds. Progressed core strengthening exercises as appropriate. See daily flowsheet for details.  Pt provided with HEP.         Assessment/Plan:    Patient is a 48  year old female with pelvic complaints.    Patient has the following significant findings with corresponding treatment plan.                Diagnosis 1:  Urgency/Frequency Syndrome, Pelvic Floor Dysfunction   Decreased ROM/flexibility - manual therapy, therapeutic exercise and home program  Decreased strength - therapeutic exercise, therapeutic activities and home program  Impaired muscle performance - biofeedback, neuro re-education and home program  Decreased function - therapeutic activities and home program    Therapy Evaluation Codes:   1) History comprised of:   Personal factors that impact the plan of care:      Age, Past/current experiences and Time since onset of symptoms.    Comorbidity factors that impact the plan of care are:      High blood pressure, Migraines/headaches and Overweight.     Medications impacting care: High blood pressure.  2) Examination of Body Systems comprised of:   Body structures and functions that impact the plan of care:      Pelvis.   Activity limitations that impact the plan of care are:      Frequency, Urgency, Urge incontinence and Urinary incontinence.  3) Clinical presentation characteristics are:   Stable/Uncomplicated.  4) Decision-Making    Low complexity using standardized patient assessment instrument and/or measureable assessment of functional outcome.  Cumulative Therapy Evaluation is: Low complexity.    Previous and current functional limitations:  (See Goal Flow Sheet for this information)    Short term and Long term goals: (See Goal Flow Sheet for this information)     Communication ability:  Patient appears to be able to clearly communicate and understand verbal and written communication and follow directions correctly.  Treatment Explanation - The following has been discussed with the patient:   RX ordered/plan of care  Anticipated outcomes  Possible risks and side effects  This patient would benefit from PT intervention to resume normal activities.   Rehab  potential is good.    Frequency:  1 X week, once daily  Duration:  for 6 weeks  Discharge Plan:  Achieve all LTG.  Independent in home treatment program.  Reach maximal therapeutic benefit.    Please refer to the daily flowsheet for treatment today, total treatment time and time spent performing 1:1 timed codes.

## 2018-10-17 NOTE — MR AVS SNAPSHOT
After Visit Summary   10/17/2018    Adriana Lucia    MRN: [id number]           Patient Information     Date Of Birth          1970        Visit Information        Provider Department      10/17/2018 12:40 PM Margoth Osuna PT [facility] Physical Therapy        Today's Diagnoses     Somatic dysfunction of pelvic region    -  1    Urge incontinence        Urgency-frequency syndrome        Urge incontinence of urine           Follow-ups after your visit        Your next 10 appointments already scheduled     Oct 25, 2018 12:40 PM CDT   [facility] For Women Only with Margoth Osuna, PT   [facility] ([facility]  )    [address] [place] [address]   [phone]            Oct 31, 2018  4:40 PM CDT   [facility] Only with Margoth Osuna, PT   [facility] ([facility]  )    [address] [place] [address]   [phone]            Nov 08, 2018 12:40 PM CST   [facility] Only with Margoth Osuna, PT   [place] [facility] ([facility]  )    [address] [place] [address]   [phone]            Nov 15, 2018 12:40 PM CST   [facility] Only with Margoth Osuna, PT   [facility] ([facility]  )    [address] [place] [address]   [phone]            Nov 21, 2018 12:40 PM CST   [facility] Only with Margoth Osuna, PT   [facility] ([facility]  )    [address] [place] [address]   [phone]            Nov 29, 2018 12:40 PM CST   [facility] Only with Margoth Osuna, PT   [facility] ([facility]  )    [address] [place] [address]   [phone]            Dec  13, 2018 10:00 AM CST   Return Urogyn with Eyad Hawk MD   [facility] ([facility])    [facility]  [address]  [facility] [address] [phone]           Please call [facility] , [phone], with any questions or concerns you may have regarding your appointment              Who to contact     If you have questions or need follow up information about today's clinic visit or your schedule please contact [facility]AP[facility] directly at [phone].  Normal or non-critical lab and imaging results will be communicated to you by MyChart, letter or phone within 4 business days after the clinic has received the results. If you do not hear from us within 7 days, please contact the clinic through true[x] Media[employer] or phone. If you have a critical or abnormal lab result, we will notify you by phone as soon as possible.  Submit refill requests through [employer] or call your pharmacy and they will forward the refill request to us. Please allow 3 business days for your refill to be completed.          Additional Information About Your Visit        true[x] Media[employer] Information     [employer] gives you secure access to your electronic health record. If you see a primary care provider, you can also send messages to your care team and make appointments. If you have questions, please call your primary care clinic.  If you do not have a primary care provider, please call [phone] and they will assist you.        Care EveryWhere ID     This is your Care EveryWhere ID. This could be used by other organizations to access your [facility] medical records  [id number]        Your Vitals Were     Last Period                   09/24/2018            Blood Pressure from Last 3 Encounters:   09/27/18 133/84   09/05/18 112/76   09/05/18 112/76    Weight from Last 3 Encounters:   09/27/18 94.3 kg (208 lb)   09/05/18 102.1 kg  (225 lb)   09/05/18 102.1 kg (225 lb)              We Performed the Following     [facility] PT EVAL, LOW COMPLEXITY     [facility] INITIAL EVAL REPORT     [facility] PT, HAND, AND CHIROPRACTIC REFERRAL     NEUROMUSCULAR RE-EDUCATION     THERAPEUTIC ACTIVITIES        Primary Care Provider Office Phone # Fax #    Nadira Jamie Colon [phone]       [facility] [address]  [facility] [address]        Equal Access to Services     NIA MOTA : Hadii aad ku hadasho Soomaali, waaxda luqadaha, qaybta kaalmada adeegyada, waxay idiin hayaan adeeg kharash lajacklyn . So [facility] [phone].    ATENCIÓN: Si giuseppe rosenbaum, tiene a bolanos disposición servicios gratuitos de asistencia lingüística. [facility] [phone].    We comply with applicable federal civil rights laws and [place] laws. We do not discriminate on the basis of race, color, national origin, age, disability, sex, sexual orientation, or gender identity.            Thank you!     Thank you for choosing [facility]  for your care. Our goal is always to provide you with excellent care. Hearing back from our patients is one way we can continue to improve our services. Please take a few minutes to complete the written survey that you may receive in the mail after your visit with us. Thank you!             Your Updated Medication List - Protect others around you: Learn how to safely use, store and throw away your medicines at www.disposemymeds.org.          This list is accurate as of 10/17/18 10:37 PM.  Always use your most recent med list.                   Brand Name Dispense Instructions for use Diagnosis    atorvastatin 20 MG tablet    LIPITOR    90 tablet    Take 1 tablet (20 mg) by mouth daily    Hyperlipidemia LDL goal <100       CALCIUM PO           enalapril 20 MG tablet    VASOTEC    90 tablet    Take 1 tablet (20 mg) by mouth daily    Essential hypertension, benign       SUMAtriptan 50 MG  tablet    IMITREX    18 tablet    Take 1 tablet (50 mg) by mouth at onset of headache for migraine May repeat dose in 2 hours.  Do not exceed 200 mg in 24 hours    Migraine with aura and without status migrainosus, not intractable       tolterodine 2 MG 24 hr capsule    DETROL LA    90 capsule    Take 1 capsule (2 mg) by mouth daily    Urge incontinence       VITAMIN D (CHOLECALCIFEROL) PO      Take 5,000 Units by mouth every other day

## 2018-10-25 ENCOUNTER — THERAPY VISIT (OUTPATIENT)
Dept: PHYSICAL THERAPY | Facility: CLINIC | Age: 48
End: 2018-10-25
Payer: COMMERCIAL

## 2018-10-25 DIAGNOSIS — N39.41 URGE INCONTINENCE OF URINE: ICD-10-CM

## 2018-10-25 DIAGNOSIS — N32.81 URGENCY-FREQUENCY SYNDROME: ICD-10-CM

## 2018-10-25 DIAGNOSIS — M99.05 SOMATIC DYSFUNCTION OF PELVIC REGION: Primary | ICD-10-CM

## 2018-10-25 PROCEDURE — 97110 THERAPEUTIC EXERCISES: CPT | Mod: GP | Performed by: PHYSICAL THERAPIST

## 2018-10-25 PROCEDURE — 97112 NEUROMUSCULAR REEDUCATION: CPT | Mod: GP | Performed by: PHYSICAL THERAPIST

## 2018-10-29 DIAGNOSIS — G43.109 MIGRAINE WITH AURA AND WITHOUT STATUS MIGRAINOSUS, NOT INTRACTABLE: ICD-10-CM

## 2018-11-01 RX ORDER — SUMATRIPTAN 50 MG/1
TABLET, FILM COATED ORAL
Qty: 12 TABLET | Refills: 0 | Status: SHIPPED | OUTPATIENT
Start: 2018-11-01 | End: 2019-05-10

## 2018-11-01 NOTE — TELEPHONE ENCOUNTER
Last refill of this medication sent in January 2017-- Called patient to discuss use of medication and if she has needed greater than 8 doses per month. Per protocol, she is OK to refill if she has not used this amount within 1 month. If she is using greater than 8x per month, will need to be seen in clinic to discuss    Reached voicemail and left message to call triage.

## 2018-11-08 ENCOUNTER — THERAPY VISIT (OUTPATIENT)
Dept: PHYSICAL THERAPY | Facility: CLINIC | Age: 48
End: 2018-11-08
Payer: COMMERCIAL

## 2018-11-08 DIAGNOSIS — M99.05 SOMATIC DYSFUNCTION OF PELVIC REGION: Primary | ICD-10-CM

## 2018-11-08 DIAGNOSIS — N39.41 URGE INCONTINENCE OF URINE: ICD-10-CM

## 2018-11-08 DIAGNOSIS — N32.81 URGENCY-FREQUENCY SYNDROME: ICD-10-CM

## 2018-11-08 PROCEDURE — 97110 THERAPEUTIC EXERCISES: CPT | Mod: GP | Performed by: PHYSICAL THERAPIST

## 2018-11-08 PROCEDURE — 97112 NEUROMUSCULAR REEDUCATION: CPT | Mod: GP | Performed by: PHYSICAL THERAPIST

## 2018-12-13 ENCOUNTER — OFFICE VISIT (OUTPATIENT)
Dept: UROLOGY | Facility: CLINIC | Age: 48
End: 2018-12-13
Attending: OBSTETRICS & GYNECOLOGY
Payer: COMMERCIAL

## 2018-12-13 VITALS
BODY MASS INDEX: 35.4 KG/M2 | HEART RATE: 81 BPM | WEIGHT: 226 LBS | DIASTOLIC BLOOD PRESSURE: 76 MMHG | SYSTOLIC BLOOD PRESSURE: 145 MMHG

## 2018-12-13 DIAGNOSIS — N81.6 RECTOCELE: ICD-10-CM

## 2018-12-13 DIAGNOSIS — N39.41 URGE INCONTINENCE: Primary | ICD-10-CM

## 2018-12-13 ASSESSMENT — PAIN SCALES - GENERAL: PAINLEVEL: NO PAIN (0)

## 2018-12-13 NOTE — PROGRESS NOTES
"Name: Adriana Lucia   MRN: [id number]   Date: December 13, 2018    Return visit    Subjective:  Patient returns today for follow-up regarding urinary frequency, urgency, and urge incontinence.     Since her last visit she she has been working with pelvic floor physical therapy, but unfortunately she has not been able to make it there as frequently as she wished. She has gone to a total of three sessions since September. She remarks that the physical therapist has noted a difference in her pelvic floor muscles, but Adriana doesn't necessarily note a difference from baseline. She wants to continue with physical therapy as she remains hopefully that this will assist with her urinary dysfunction.    As far as the Detrol she started, she feels this has also had a limited impact on her urinary dysfunction. She reports that her frequency has remained the same or worse than pervious. She continues to note strong urge symptoms that prompt need to quickly use the restroom. She fortunately has not had any accidents since September. She denies any side effects such as dry mouth or constipation from the medication.    Objective:  /76   Pulse 81   Wt 102.5 kg (226 lb)   BMI 35.40 kg/m      General: Comfortable, No acute distress  CV: Regular rate  Pulm: Normal respiratory effort    Assessment/Plan:  Patient returns today for follow-up regarding urinary frequency, urgency, and urge incontinence, which is currently being managed via pelvic floor physical therapy and Detrol.    - Recommend continuation of pelvic floor physical therapy as well as bladder training strategies (kegel exercises at time of urge onset) to continue to address frequency and urgency in conjunction to medication  - Recommend increase in Detrol to 4 mg daily.  Although the patient does not note any difference as far as urgency, she has not had an \"accident\" since September.  Will trial increase in Detrol for an additional 4-6 weeks. If " unsuccessful, will consider alternative anti-muscarinic agent vs. Myrbetriq.  - Return to clinic in 4-6 weeks to reassess progress and discuss further management, such as alternative medication, botox injection, interstim, etc.    Seen and staffed with Dr. Carine Reese MD  OB/GYN Resident, PGY-4  12/13/2018 10:15 AM   -----    A total of 15 minutes were spent with the patient today, > 50% in counseling and coordination of care    I attest that I was present for the history and physical and that I agree with the findings and treatment plan outlined above.    Eyad Hawk MD  Professor, OB/GYN  Urogynecologist           CC  Patient Care Team:  Nadira Colon MD as PCP - General (Internal Medicine)  Humera Becerra MD as MD (OB/Gyn)  Xiomy, Zeynep Andrea PA-C as Physician Assistant (Physician Assistant)  Nadira Colon MD as MD (Internal Medicine)  SELF, REFERRED

## 2018-12-13 NOTE — LETTER
12/13/2018       RE: Adriana Lucia  [address] [facility] [address]     Dear Colleague,    Thank you for referring your patient, Adriana Lucia, to the [facility]. Please see a copy of my visit note below.    Name: Adriana Lucia   MRN: [id number]   Date: December 13, 2018    Return visit    Subjective:  Patient returns today for follow-up regarding urinary frequency, urgency, and urge incontinence.     Since her last visit she she has been working with pelvic floor physical therapy, but unfortunately she has not been able to make it there as frequently as she wished. She has gone to a total of three sessions since September. She remarks that the physical therapist has noted a difference in her pelvic floor muscles, but Adriana doesn't necessarily note a difference from baseline. She wants to continue with physical therapy as she remains hopefully that this will assist with her urinary dysfunction.    As far as the Detrol she started, she feels this has also had a limited impact on her urinary dysfunction. She reports that her frequency has remained the same or worse than pervious. She continues to note strong urge symptoms that prompt need to quickly use the restroom. She fortunately has not had any accidents since September. She denies any side effects such as dry mouth or constipation from the medication.    Objective:  /76   Pulse 81   Wt 102.5 kg (226 lb)   BMI 35.40 kg/m       General: Comfortable, No acute distress  CV: Regular rate  Pulm: Normal respiratory effort    Assessment/Plan:  Patient returns today for follow-up regarding urinary frequency, urgency, and urge incontinence, which is currently being managed via pelvic floor physical therapy and Detrol.    - Recommend continuation of pelvic floor physical therapy as well as bladder training strategies (kegel exercises at time of urge onset) to  "continue to address frequency and urgency in conjunction to medication  - Recommend increase in Detrol to 4 mg daily.  Although the patient does not note any difference as far as urgency, she has not had an \"accident\" since September.  Will trial increase in Detrol for an additional 4-6 weeks. If unsuccessful, will consider alternative anti-muscarinic agent vs. Myrbetriq.  - Return to clinic in 4-6 weeks to reassess progress and discuss further management, such as alternative medication, botox injection, interstim, etc.    Seen and staffed with Dr. Carine Reese MD  OB/GYN Resident, PGY-4  12/13/2018 10:15 AM   -----    A total of 15 minutes were spent with the patient today, > 50% in counseling and coordination of care    I attest that I was present for the history and physical and that I agree with the findings and treatment plan outlined above.    Eyad Hawk MD  Professor, OB/GYN  Urogynecologist       CC  Patient Care Team:  Nadira Colon MD as PCP - General (Internal Medicine)  Humera Becerra MD as MD (OB/Gyn)  Zeynep Stauffer PA-C as Physician Assistant (Physician Assistant)  Nadira Colon MD as MD (Internal Medicine)  SELF, REFERRED          "

## 2018-12-18 DIAGNOSIS — N39.41 URGE INCONTINENCE: ICD-10-CM

## 2018-12-18 RX ORDER — TOLTERODINE 4 MG/1
4 CAPSULE, EXTENDED RELEASE ORAL DAILY
Qty: 90 CAPSULE | Refills: 3 | Status: SHIPPED | OUTPATIENT
Start: 2018-12-18 | End: 2019-05-30

## 2018-12-20 ENCOUNTER — THERAPY VISIT (OUTPATIENT)
Dept: PHYSICAL THERAPY | Facility: CLINIC | Age: 48
End: 2018-12-20
Payer: COMMERCIAL

## 2018-12-20 DIAGNOSIS — N32.81 URGENCY-FREQUENCY SYNDROME: ICD-10-CM

## 2018-12-20 DIAGNOSIS — N39.41 URGE INCONTINENCE OF URINE: ICD-10-CM

## 2018-12-20 PROCEDURE — 97110 THERAPEUTIC EXERCISES: CPT | Mod: GP | Performed by: PHYSICAL THERAPIST

## 2018-12-20 PROCEDURE — 97112 NEUROMUSCULAR REEDUCATION: CPT | Mod: GP | Performed by: PHYSICAL THERAPIST

## 2019-01-17 ENCOUNTER — THERAPY VISIT (OUTPATIENT)
Dept: PHYSICAL THERAPY | Facility: CLINIC | Age: 49
End: 2019-01-17
Payer: COMMERCIAL

## 2019-01-17 DIAGNOSIS — N39.41 URGE INCONTINENCE OF URINE: ICD-10-CM

## 2019-01-17 DIAGNOSIS — N32.81 URGENCY-FREQUENCY SYNDROME: ICD-10-CM

## 2019-01-17 PROCEDURE — 97112 NEUROMUSCULAR REEDUCATION: CPT | Mod: GP | Performed by: PHYSICAL THERAPIST

## 2019-01-17 NOTE — PROGRESS NOTES
[facility]  System  Physical Exam  General   ROS    PROGRESS  REPORT    Progress reporting period is from 10/17/2018 to 1/17/2019.       SUBJECTIVE  Subjective: Pt has been more compliant with HEP. She is having the most incontinence first thing in the morning. She has not noticed any difference with increased dosage of medication.     Current pain level is: 0/10   Initial Pain level: 0/10.   Changes in function:  Minimal change in function   Adverse reaction to treatment or activity: None    OBJECTIVE  Changes noted in objective findings:  Yes,     MUSCLE PERFORMANCE 1/17/2019     Baseline PF tone: WNL  PF Tone with cough: WNL  Valsalva: slight bulge  PF Response quality: moderate  PF Power: Center: 2   Endurance: Maximum contraction in seconds: 5-7 seconds before compensation   # of endurance contractions before fatigue: NT  Quick contraction repetitions prior to fatigue: NT  Specificity/accessory muscles: Improved isolation this session   Prolapse: Cyctocele/Rectocele/Uterine ndgndrndanddndend:nd nd2nd rectocele     MUSCLE PERFORMANCE 10/17/2018     Baseline PF tone:hypo with areas of trigger points in deeper layers  PF Tone with cough: bulge in rectocele  Valsalva: bulge  PF Response quality: moderate  PF Power: Center: 2   Endurance: Maximum contraction in seconds: 7 before compensation from adductors  # of endurance contractions before fatigue: NT  Quick contraction repetitions prior to fatigue: 6.  Specificity/accessory muscles: Abdominals, adductors, glutes  Prolapse: Cyctocele/Rectocele/Uterine ndgndrndanddndend:nd nd2nd rectocele   Objective: Improved control of pelvic floor this session. Still more difficulty relaxing but improved with practice.      ASSESSMENT/PLAN  Updated problem list and treatment plan: Diagnosis 1:  Pelvic floor dysfunction, urgency/frequency  Decreased strength - therapeutic exercise, therapeutic activities and home program  Impaired muscle performance - biofeedback, electric stimulation, neuro re-education and home  program  Decreased function - therapeutic activities and home program  STG/LTGs have been met or progress has been made towards goals:  Slow progress towards goals   Assessment of Progress: The patient's condition has potential to improve.  Self Management Plans:  Patient has been instructed in a home treatment program.  I have re-evaluated this patient and find that the nature, scope, duration and intensity of the therapy is appropriate for the medical condition of the patient.  Adriana continues to require the following intervention to meet STG and LTG's:  PT    Recommendations:  This patient would benefit from continued therapy.     Frequency:  2 X a month, once daily  Duration:  for 2 months      Please refer to the daily flowsheet for treatment today, total treatment time and time spent performing 1:1 timed codes.

## 2019-01-31 ENCOUNTER — OFFICE VISIT (OUTPATIENT)
Dept: UROLOGY | Facility: CLINIC | Age: 49
End: 2019-01-31
Attending: OBSTETRICS & GYNECOLOGY
Payer: COMMERCIAL

## 2019-01-31 VITALS
SYSTOLIC BLOOD PRESSURE: 115 MMHG | WEIGHT: 224 LBS | BODY MASS INDEX: 35.16 KG/M2 | HEART RATE: 85 BPM | HEIGHT: 67 IN | DIASTOLIC BLOOD PRESSURE: 78 MMHG

## 2019-01-31 DIAGNOSIS — N39.41 URGE INCONTINENCE: Primary | ICD-10-CM

## 2019-01-31 PROCEDURE — G0463 HOSPITAL OUTPT CLINIC VISIT: HCPCS | Mod: ZF

## 2019-01-31 ASSESSMENT — PAIN SCALES - GENERAL: PAINLEVEL: NO PAIN (0)

## 2019-01-31 ASSESSMENT — MIFFLIN-ST. JEOR: SCORE: 1683.81

## 2019-01-31 NOTE — PROGRESS NOTES
"Urogynecology return visit note  2019     CC: follow up urge incontinence    HPI: Ms Lucia is a 48 year old  who has been seen in Urogynecology clinic for urinary frequency, urgency and urge incontinence. She is receiving pelvic floor physical therapy and has been to biweekly appointments. Last visit (), her Detrol was increased from 2 mg (started September) to 4 mg. Despite this, she really does not feel any different in her urges. As far as accidents, she had not had any in December but reports having two small volume losses this past week. Confirms that she still only has urge-type symptoms, never losing urine with cough/sneeze/laugh etc. Notes that she could work a little harder at her PFPT exercises but has been attending all appointments. Has not had side effects like dry mouth or constipation with the Detrol.     O:   Patient Vitals for the past 12 hrs:   BP Pulse Height Weight   19 0830 115/78 85 1.702 m (5' 7.01\") 101.6 kg (224 lb)     Gen: alert, NAD    A/P  48 year old with urge incontinence. Essentially no improvement with Detrol dose increase and continued adherence to PFPT.     - Discontinue Detrol, start Myrbetriq 25 mg with option to increase to 50 mg daily at 2-4 weeks if improved symptoms.   - She is already aware of future interventions such as Botox or Interstim; these had been discussed at previous appointments.     Discussed with Dr. Hawk.     Margarita Gaspar MD PGY4  OB/Gyn  2019, 8:54 AM    A total of 15 minutes was spent with the patient. Greater than 50% was spent counseling and reviewing her medical plan.    I attest that I was present for the history and physical and that I agree with the findings and treatment plan outlined above.    Eyad Hawk MD  Professor, OB/GYN  Urogynecologist          "

## 2019-01-31 NOTE — NURSING NOTE
Chief Complaint   Patient presents with     RECHECK     6 week follow up after PT   detrol still not helping and PT is still in beginning in process.

## 2019-01-31 NOTE — LETTER
"2019       RE: Adriana Lucia  [address] [facility] [place] [address]     Dear Colleague,    Thank you for referring your patient, Adriana Lucia, to the [facility] at [facility]. Please see a copy of my visit note below.    Urogynecology return visit note  2019     CC: follow up urge incontinence    HPI: Ms Lucia is a 48 year old  who has been seen in Urogynecology clinic for urinary frequency, urgency and urge incontinence. She is receiving pelvic floor physical therapy and has been to biweekly appointments. Last visit (), her Detrol was increased from 2 mg (started September) to 4 mg. Despite this, she really does not feel any different in her urges. As far as accidents, she had not had any in December but reports having two small volume losses this past week. Confirms that she still only has urge-type symptoms, never losing urine with cough/sneeze/laugh etc. Notes that she could work a little harder at her PFPT exercises but has been attending all appointments. Has not had side effects like dry mouth or constipation with the Detrol.     O:   Patient Vitals for the past 12 hrs:   BP Pulse Height Weight   19 0830 115/78 85 1.702 m (5' 7.01\") 101.6 kg (224 lb)     Gen: alert, NAD    A/P  48 year old with urge incontinence. Essentially no improvement with Detrol dose increase and continued adherence to PFPT.     - Discontinue Detrol, start Myrbetriq 25 mg with option to increase to 50 mg daily at 2-4 weeks if improved symptoms.   - She is already aware of future interventions such as Botox or Interstim; these had been discussed at previous appointments.     Discussed with Dr. Hawk.     Margarita Gaspar MD PGY4  OB/Gyn  2019, 8:54 AM    A total of 15 minutes was spent with the patient. Greater than 50% was spent counseling and reviewing her medical plan.    I attest that I was present for the history and " physical and that I agree with the findings and treatment plan outlined above.    Eyad Hawk MD  Professor, OB/GYN  Urogynecologist

## 2019-02-04 DIAGNOSIS — N32.81 OVERACTIVE BLADDER: Primary | ICD-10-CM

## 2019-02-04 RX ORDER — MIRABEGRON 25 MG/1
25 TABLET, FILM COATED, EXTENDED RELEASE ORAL DAILY
Qty: 90 TABLET | Refills: 3 | Status: SHIPPED | OUTPATIENT
Start: 2019-02-04 | End: 2019-11-04

## 2019-05-10 DIAGNOSIS — G43.109 MIGRAINE WITH AURA AND WITHOUT STATUS MIGRAINOSUS, NOT INTRACTABLE: ICD-10-CM

## 2019-05-13 RX ORDER — SUMATRIPTAN 50 MG/1
TABLET, FILM COATED ORAL
Qty: 9 TABLET | Refills: 0 | Status: SHIPPED | OUTPATIENT
Start: 2019-05-13 | End: 2020-10-09

## 2019-05-30 ENCOUNTER — OFFICE VISIT (OUTPATIENT)
Dept: UROLOGY | Facility: CLINIC | Age: 49
End: 2019-05-30
Attending: OBSTETRICS & GYNECOLOGY
Payer: COMMERCIAL

## 2019-05-30 VITALS
HEART RATE: 80 BPM | DIASTOLIC BLOOD PRESSURE: 83 MMHG | BODY MASS INDEX: 34.76 KG/M2 | SYSTOLIC BLOOD PRESSURE: 119 MMHG | WEIGHT: 222 LBS

## 2019-05-30 DIAGNOSIS — N32.81 OVERACTIVE BLADDER: Primary | ICD-10-CM

## 2019-05-30 DIAGNOSIS — N39.41 URGE INCONTINENCE: ICD-10-CM

## 2019-05-30 RX ORDER — MIRABEGRON 50 MG/1
50 TABLET, EXTENDED RELEASE ORAL DAILY
Qty: 90 TABLET | Refills: 1 | Status: SHIPPED | OUTPATIENT
Start: 2019-05-30 | End: 2019-12-30

## 2019-05-30 ASSESSMENT — PAIN SCALES - GENERAL: PAINLEVEL: NO PAIN (0)

## 2019-05-30 NOTE — NURSING NOTE
About 15% improvement with PT and medications-  Hurt back  Still trying to do exercises at home.   10-12 times urinating during the day.  Get up once a night.

## 2019-05-30 NOTE — PROGRESS NOTES
May 30, 2019    Return visit    Patient returns today for a follow up on her urge incontinence and frequency, on Myrbetriq 25 mg daily. She reports since her last visit, a 15% improvement of symptoms, mostly slightly decrease in frequency. She voids 8-12x daily currently, whereas, prior to starting her medication reports voids >12x daily. Reports urge incontinence is similar to prior to starting medication at x2-3 weekly. She has not been going to PF PT as much recently due to a lower back injury this spring, but has been doing a PF PT home regimen and plans to return to formal PT soon. She would prefer to increase the dose of her medication or trial another medication vs botox or interstim.     /83   Pulse 80   Wt 100.7 kg (222 lb)   BMI 34.76 kg/m    She is comfortable, in no distress, non-labored breathing.      A/P: 48 year old F with urge incontinence and frequency currently on Myrbetriq 25 mg daily with minor improvement of symptoms. Has tried Detrol in the past with no relief. She would prefer to increase the dose of Myrbetriq, but understands that botox injections and interstim placement are other future options.   - Increase Myrbetriq to 50 mg daily.  - We reviewed tertiary options for the treatment of her UUI to include BOTOX injections of the bladder and sacral neuromodulation.    Rhonda Evans, MS4, [employer], scribed for supervisor, Eyad Hawk MD    A total of 15 minutes were spent with the patient today, > 50% in counseling and coordination of care    Eyad Hawk MD  Professor, OB/GYN  Urogynecologist    CC  Patient Care Team:  Nadira Colon MD as PCP - General (Internal Medicine)  Humera Becerra MD as MD (OB/Gyn)  Zeynep Stauffer PA-C as Physician Assistant (Physician Assistant)  Nadira Colon MD as MD (Internal Medicine)  SELF, REFERRED

## 2019-05-30 NOTE — LETTER
5/30/2019       RE: Adriana Lucia  [address] [facility] [place] [address]     Dear Colleague,    Thank you for referring your patient, Adriana Lucia, to the [facility] at [facility]. Please see a copy of my visit note below.    May 30, 2019    Return visit    Patient returns today for a follow up on her urge incontinence and frequency, on Myrbetriq 25 mg daily. She reports since her last visit, a 15% improvement of symptoms, mostly slightly decrease in frequency. She voids 8-12x daily currently, whereas, prior to starting her medication reports voids >12x daily. Reports urge incontinence is similar to prior to starting medication at x2-3 weekly. She has not been going to PF PT as much recently due to a lower back injury this spring, but has been doing a PF PT home regimen and plans to return to formal PT soon. She would prefer to increase the dose of her medication or trial another medication vs botox or interstim.     /83   Pulse 80   Wt 100.7 kg (222 lb)   BMI 34.76 kg/m     She is comfortable, in no distress, non-labored breathing.      A/P: 48 year old F with urge incontinence and frequency currently on Myrbetriq 25 mg daily with minor improvement of symptoms. Has tried Detrol in the past with no relief. She would prefer to increase the dose of Myrbetriq, but understands that botox injections and interstim placement are other future options.   - Increase Myrbetriq to 50 mg daily.  - We reviewed tertiary options for the treatment of her UUI to include BOTOX injections of the bladder and sacral neuromodulation.    Rhonda Evans, MS4, [employer], scribed for supervisor, Eyad Hawk MD    A total of 15 minutes were spent with the patient today, > 50% in counseling and coordination of care    Eyad Hawk MD  Professor, OB/GYN  Urogynecologist    CC  Patient Care Team:  Nadira Colon MD  as PCP - General (Internal Medicine)  Humera Becerra MD as MD (OB/Gyn)  Zeynep Stauffer PA-C as Physician Assistant (Physician Assistant)  Nadira Colon MD as MD (Internal Medicine)

## 2019-09-23 ENCOUNTER — OFFICE VISIT (OUTPATIENT)
Dept: ORTHOPEDICS | Facility: CLINIC | Age: 49
End: 2019-09-23
Payer: COMMERCIAL

## 2019-09-23 VITALS — BODY MASS INDEX: 34.77 KG/M2 | RESPIRATION RATE: 16 BRPM | HEIGHT: 67 IN

## 2019-09-23 DIAGNOSIS — M79.642 LEFT HAND PAIN: Primary | ICD-10-CM

## 2019-09-23 RX ORDER — DICLOFENAC SODIUM 75 MG/1
75 TABLET, DELAYED RELEASE ORAL 2 TIMES DAILY
Qty: 28 TABLET | Refills: 1 | Status: SHIPPED | OUTPATIENT
Start: 2019-09-23 | End: 2019-11-04

## 2019-09-23 NOTE — PROGRESS NOTES
[facility]  Orthopedics  Lee Huitron MD  2019     Name: Adriana Lucia  MRN: [id number]  Age: 49 year old  : 1970  Referring provider: Referred Self     Chief Complaint: Pain of the Left Hand    History of Present Illness:   Adriana Lucia is a 49 year old, right handed female who presents today for evaluation of left hand pain sustained approximately two weeks ago. She currently endorses shooting and sharp pain over the third MCP joint, and some pain in her fourth finger as well. She also endorses swelling localized in the same area that began on 19. Pain intermittently radiates down to her finger. Pain is exacerbated with gripping. Pain is alleviated with rest, ice, heat, and certain arm positions.     Review of Systems:   A 10-point review of systems was obtained and is negative except for as noted in the HPI.     Medications:      atorvastatin (LIPITOR) 20 MG tablet, Take 1 tablet (20 mg) by mouth daily, Disp: 90 tablet, Rfl: 3     CALCIUM PO, , Disp: , Rfl:      enalapril (VASOTEC) 20 MG tablet, Take 1 tablet (20 mg) by mouth daily, Disp: 90 tablet, Rfl: 3     mirabegron (MYRBETRIQ) 25 MG 24 hr tablet, Take 1 tablet (25 mg) by mouth daily, Disp: 90 tablet, Rfl: 3     mirabegron (MYRBETRIQ) 50 MG 24 hr tablet, Take 1 tablet (50 mg) by mouth daily, Disp: 90 tablet, Rfl: 1     SUMAtriptan (IMITREX) 50 MG tablet, take 1 tablet by mouth at onset of headache for migraine, may repeat dose in 2 hours, max 200mg in 24 hours, Disp: 9 tablet, Rfl: 0     VITAMIN D, CHOLECALCIFEROL, PO, Take 5,000 Units by mouth every other day, Disp: , Rfl:     Allergies:  The patient reports no known allergies.    Past Medical History:  HSV  Hyperlipidemia  Hypertension    Past Surgical History:   Section  [place] teeth    Social History:  She denies tobacco use and admits to alcohol use.     Family History:  Positive: Asthma (father), Allergies (father, mother), Lipids (father, mother, maternal  "grandmother), Cerebrovascular Disease (father), Hypertension (father, mother), Hyperlipidemia (father, mother, maternal grandmother), Diabetes (father), Obesity (father, mother, maternal grandmother), Alcohol/Drug (paternal grandfather), Substance Abuse (paternal grandfather), Thyroid disease (mother), Anxiety disorder (mother), Depression (mother)    Physical Examination:  Resp. rate 16, height 1.702 m (5' 7\"), not currently breastfeeding.  General  - alert, pleasant, no distress  CV  - normal radial pulse, cap refill brisk  Musculoskeletal - left hand  - inspection: no atrophy, normal joint alignment, no swelling  - palpation: no bony or soft tissue tenderness, no tenderness at the anatomical snuffbox  - ROM:  MCP 90 deg flexion   0 deg extension    deg flexion   0 deg extension   DIP 80 deg flexion   0 deg extension  - strength: 5/5  strength, 5/5 wrist abduction, 5/5 flexion, extension, pronation, supination, adduction  - special tests:  (-) varus  (-) valgus  Neuro  - no numbness, no motor deficit, grossly normal coordination, normal muscle tone  Skin  - no ecchymosis, erythema, warmth, or induration, no obvious rash      Assessment:   49 year old female with left hand pain. Etiology somewhat unclear, possibly overuse, though nerve impingement also considered.     Plan:     Recommended taking diclofenac for symptomatic relief.     Also recommended a wrist splint for comfort and to rest the wrist/hand.     Follow-up with me if pain is persistent or worsens.     Lee Huitron MD    Scribe Disclosure:  I, Alexey Santos, am serving as a scribe to document services personally performed by Lee Hiutron MD at this visit, based upon the provider's statements to me. All documentation has been reviewed by the aforementioned provider prior to being entered into the official medical record.       "

## 2019-09-23 NOTE — Clinical Note
2019       RE: Adriana Lucia  [address] [facility] [place] [address]     Dear Colleague,    Thank you for referring your patient, Adrinaa Lucia, to the [facility]ALK [facility] CLINIC at [facility]. Please see a copy of my visit note below.    [facility]  Orthopedics  Lee Huitron MD  2019     Name: Adriana Lucia  MRN: [id number]  Age: 49 year old  : 1970  Referring provider: Referred Self     Chief Complaint: Pain of the Left Hand    History of Present Illness:   Adriana Lucia is a 49 year old, right handed female who presents today for evaluation of left hand pain sustained approximately two weeks ago. She currently endorses shooting and sharp pain over the third MCP joint, and some pain in her fourth finger as well. She also endorses swelling localized in the same area that began on 19. Pain intermittently radiates down to her finger. Pain is exacerbated with gripping. Pain is alleviated with rest, ice, heat, and certain arm positions.     Review of Systems:   A 10-point review of systems was obtained and is negative except for as noted in the HPI.     Medications:      atorvastatin (LIPITOR) 20 MG tablet, Take 1 tablet (20 mg) by mouth daily, Disp: 90 tablet, Rfl: 3     CALCIUM PO, , Disp: , Rfl:      enalapril (VASOTEC) 20 MG tablet, Take 1 tablet (20 mg) by mouth daily, Disp: 90 tablet, Rfl: 3     mirabegron (MYRBETRIQ) 25 MG 24 hr tablet, Take 1 tablet (25 mg) by mouth daily, Disp: 90 tablet, Rfl: 3     mirabegron (MYRBETRIQ) 50 MG 24 hr tablet, Take 1 tablet (50 mg) by mouth daily, Disp: 90 tablet, Rfl: 1     SUMAtriptan (IMITREX) 50 MG tablet, take 1 tablet by mouth at onset of headache for migraine, may repeat dose in 2 hours, max 200mg in 24 hours, Disp: 9 tablet, Rfl: 0     VITAMIN D, CHOLECALCIFEROL, PO, Take 5,000 Units by mouth every other day, Disp: , Rfl:     Allergies:  The patient reports no  "known allergies.    Past Medical History:  HSV  Hyperlipidemia  Hypertension    Past Surgical History:   Section  [place] teeth    Social History:  She denies tobacco use and admits to alcohol use.     Family History:  Positive: Asthma (father), Allergies (father, mother), Lipids (father, mother, maternal grandmother), Cerebrovascular Disease (father), Hypertension (father, mother), Hyperlipidemia (father, mother, maternal grandmother), Diabetes (father), Obesity (father, mother, maternal grandmother), Alcohol/Drug (paternal grandfather), Substance Abuse (paternal grandfather), Thyroid disease (mother), Anxiety disorder (mother), Depression (mother)    Physical Examination:  Resp. rate 16, height 1.702 m (5' 7\"), not currently breastfeeding.  Exam:  Patient is alert, in no acute distress, pleasant and conversational    {left/right:435287}wrist/hand:  Skin intact, no erythema, rash, or ecchymosis. No skin breakdown   No soft tissue swelling or joint effusion of the wrist or hand. No atrophy of thenar or hypothenar emminances.   Full flexion and extension of the wrist and elbow without pain    Range of Motion:  Hand AROM: Full flexion and extension of the DIP, PIP and MCP joints of digits #2-5., Thumb with full flexion and extension of IP and MCP joints, opposition, abduction and adduction intact.    No subjective pain reported with range of motion testing. ROM {IS:502725} symmetric with uninjured side     Strength Testing:  Hand: Full strength with flexion, extension, abduction and adduction of the phalanges #2-5, Full strength with flexion/extension, abduction, adduction and opposition of the thumb  No subjective pain reported with strength testing    Palpation:  {POSITIVE/NEGATIVE:974933::\"negative\"} tenderness to palpation at the anatomic snuffbox  {POSITIVE/NEGATIVE:454323::\"negative\"} tenderness with compression loading of the scaphoid  {POSITIVE/NEGATIVE:457561::\"negative\"} tenderness to palpation of the " "other carpal bones  {POSITIVE/NEGATIVE:054883::\"negative\"} tenderness to palpation of the metacarpals  {POSITIVE/NEGATIVE:425783::\"negative\"} tenderness to palpation of the phalanges    {POSITIVE/NEGATIVE:508778::\"negative\"} tenderness to palpation of the distal radius or distal ulna  {POSITIVE/NEGATIVE:011779::\"negative\"} tenderness to palpation of the radial head    {POSITIVE/NEGATIVE:842379::\"negative\"} carpal instability on exam     +2/4 radial pulse, less than 2 second capillary refill  Sensation is intact throughout the hand to light touch and pinprick testing    Assessment:   49 year old female with ***    Plan:     Recommended taking diclofenac for symptomatic relief.     Also recommended a wrist splint for comfort.     Follow-up with me if pain is persistent or worsens.     Scribe Disclosure:  IAlexey, am serving as a scribe to document services personally performed by Lee Huitron MD at this visit, based upon the provider's statements to me. All documentation has been reviewed by the aforementioned provider prior to being entered into the official medical record.               SPORTS & ORTHOPEDIC WALK-IN VISIT 9/23/2019    Primary Care Physician: Dr. Colon    Woke up 2 weeks ago, felt like she jammed her knuckle in her sleep. On 9/20 started swelling. Pain is mainly over third MCP joint but does feel some pain in fourth finger as well. Sometimes pain shoots down finger. If her arm is in a certain position it makes pain worse. Keeps her from sleeping sometimes. A few months ago had been having trouble sleeping on that side, pain in shoulder, arm would go numb sometimes.     Reason for visit:     What part of your body is injured / painful?  left hand    What caused the injury /pain? Unsure    How long ago did your injury occur or pain begin? several weeks ago    What are your most bothersome symptoms? Pain and Swelling    How would you characterize your symptom?  Shooting, sharp    What makes " your symptoms better? Rest, Ice and Heat - ibuprofen doesn't help     What makes your symptoms worse? Other: gripping     Have you been previously seen for this problem? No    Medical History:    Any recent changes to your medical history? No    Any new medication prescribed since last visit? No    Have you had surgery on this body part before? No    Social History:    Occupation: pharmacy tech     Handedness: Right    Exercise:     Review of Systems:    Do you have fever, chills, weight loss? No    Do you have any vision problems? No    Do you have any chest pain or edema? No    Do you have any shortness of breath or wheezing?  No    Do you have stomach problems? No    Do you have any numbness or focal weakness? No    Do you have diabetes? No    Do you have problems with bleeding or clotting? No    Do you have an rashes or other skin lesions? No           Again, thank you for allowing me to participate in the care of your patient.      Sincerely,    Lee Huitron MD

## 2019-09-23 NOTE — PROGRESS NOTES
SPORTS & ORTHOPEDIC WALK-IN VISIT 9/23/2019    Primary Care Physician: Dr. Colon    Woke up 2 weeks ago, felt like she jammed her knuckle in her sleep. On 9/20 started swelling. Pain is mainly over third MCP joint but does feel some pain in fourth finger as well. Sometimes pain shoots down finger. If her arm is in a certain position it makes pain worse. Keeps her from sleeping sometimes. A few months ago had been having trouble sleeping on that side, pain in shoulder, arm would go numb sometimes.     Reason for visit:     What part of your body is injured / painful?  left hand    What caused the injury /pain? Unsure    How long ago did your injury occur or pain begin? several weeks ago    What are your most bothersome symptoms? Pain and Swelling    How would you characterize your symptom?  Shooting, sharp    What makes your symptoms better? Rest, Ice and Heat - ibuprofen doesn't help     What makes your symptoms worse? Other: gripping     Have you been previously seen for this problem? No    Medical History:    Any recent changes to your medical history? No    Any new medication prescribed since last visit? No    Have you had surgery on this body part before? No    Social History:    Occupation: pharmacy tech     Handedness: Right    Exercise:     Review of Systems:    Do you have fever, chills, weight loss? No    Do you have any vision problems? No    Do you have any chest pain or edema? No    Do you have any shortness of breath or wheezing?  No    Do you have stomach problems? No    Do you have any numbness or focal weakness? No    Do you have diabetes? No    Do you have problems with bleeding or clotting? No    Do you have an rashes or other skin lesions? No

## 2019-10-03 ENCOUNTER — HEALTH MAINTENANCE LETTER (OUTPATIENT)
Age: 49
End: 2019-10-03

## 2019-10-10 DIAGNOSIS — E78.5 HYPERLIPIDEMIA LDL GOAL <100: ICD-10-CM

## 2019-10-10 DIAGNOSIS — I10 ESSENTIAL HYPERTENSION, BENIGN: ICD-10-CM

## 2019-10-17 RX ORDER — ENALAPRIL MALEATE 20 MG/1
20 TABLET ORAL DAILY
Qty: 30 TABLET | Refills: 2 | Status: SHIPPED | OUTPATIENT
Start: 2019-10-17 | End: 2019-11-13

## 2019-10-17 RX ORDER — ATORVASTATIN CALCIUM 20 MG/1
TABLET, FILM COATED ORAL
Qty: 30 TABLET | Refills: 2 | Status: SHIPPED | OUTPATIENT
Start: 2019-10-17 | End: 2019-11-13

## 2019-10-17 NOTE — TELEPHONE ENCOUNTER
Received refill request for atorvastatin, enalapril.  Patient has upcoming visit November 2019. Refills sent.

## 2019-10-21 ASSESSMENT — ENCOUNTER SYMPTOMS
DECREASED APPETITE: 0
FLANK PAIN: 0
WEIGHT GAIN: 0
DIFFICULTY URINATING: 0
NECK PAIN: 0
DEPRESSION: 1
JOINT SWELLING: 0
MUSCLE CRAMPS: 1
HEARTBURN: 0
POLYPHAGIA: 0
BOWEL INCONTINENCE: 0
JAUNDICE: 0
CHILLS: 0
NAUSEA: 0
WEIGHT LOSS: 0
PANIC: 0
INCREASED ENERGY: 0
ALTERED TEMPERATURE REGULATION: 1
BACK PAIN: 1
HALLUCINATIONS: 0
BLOOD IN STOOL: 0
HEMATURIA: 0
DECREASED CONCENTRATION: 1
NIGHT SWEATS: 1
ABDOMINAL PAIN: 0
VOMITING: 0
FEVER: 0
STIFFNESS: 0
BLOATING: 1
INSOMNIA: 1
RECTAL PAIN: 0
ARTHRALGIAS: 1
FATIGUE: 1
POLYDIPSIA: 0
MUSCLE WEAKNESS: 1
DIARRHEA: 0
CONSTIPATION: 1
NERVOUS/ANXIOUS: 0
DYSURIA: 0
MYALGIAS: 1

## 2019-10-21 NOTE — PROGRESS NOTES
Patient did not return for further treatment and no additional progress was noted.  Please refer to the progress note and goal flowsheet completed on 01/17/19 for discharge information.

## 2019-10-28 PROBLEM — N32.81 URGENCY-FREQUENCY SYNDROME: Status: RESOLVED | Noted: 2018-10-17 | Resolved: 2019-01-17

## 2019-10-28 PROBLEM — N39.41 URGE INCONTINENCE OF URINE: Status: RESOLVED | Noted: 2018-10-17 | Resolved: 2019-01-17

## 2019-11-01 ENCOUNTER — HEALTH MAINTENANCE LETTER (OUTPATIENT)
Age: 49
End: 2019-11-01

## 2019-11-04 ENCOUNTER — OFFICE VISIT (OUTPATIENT)
Dept: INTERNAL MEDICINE | Facility: CLINIC | Age: 49
End: 2019-11-04
Attending: INTERNAL MEDICINE
Payer: COMMERCIAL

## 2019-11-04 VITALS
DIASTOLIC BLOOD PRESSURE: 84 MMHG | HEIGHT: 67 IN | SYSTOLIC BLOOD PRESSURE: 123 MMHG | WEIGHT: 226.7 LBS | HEART RATE: 85 BPM | BODY MASS INDEX: 35.58 KG/M2

## 2019-11-04 DIAGNOSIS — I10 CHRONIC HYPERTENSION: ICD-10-CM

## 2019-11-04 DIAGNOSIS — G47.00 INSOMNIA, UNSPECIFIED TYPE: Primary | ICD-10-CM

## 2019-11-04 DIAGNOSIS — Z00.00 ROUTINE GENERAL MEDICAL EXAMINATION AT A HEALTH CARE FACILITY: ICD-10-CM

## 2019-11-04 LAB
ANION GAP SERPL CALCULATED.3IONS-SCNC: 5 MMOL/L (ref 3–14)
BUN SERPL-MCNC: 8 MG/DL (ref 7–30)
CALCIUM SERPL-MCNC: 8.7 MG/DL (ref 8.5–10.1)
CHLORIDE SERPL-SCNC: 112 MMOL/L (ref 94–109)
CHOLEST SERPL-MCNC: 154 MG/DL
CO2 SERPL-SCNC: 25 MMOL/L (ref 20–32)
CREAT SERPL-MCNC: 0.62 MG/DL (ref 0.52–1.04)
GFR SERPL CREATININE-BSD FRML MDRD: >90 ML/MIN/{1.73_M2}
GLUCOSE SERPL-MCNC: 90 MG/DL (ref 70–99)
HDLC SERPL-MCNC: 51 MG/DL
LDLC SERPL CALC-MCNC: 86 MG/DL
NONHDLC SERPL-MCNC: 103 MG/DL
POTASSIUM SERPL-SCNC: 3.8 MMOL/L (ref 3.4–5.3)
SODIUM SERPL-SCNC: 142 MMOL/L (ref 133–144)
TRIGL SERPL-MCNC: 87 MG/DL
TSH SERPL DL<=0.005 MIU/L-ACNC: 2.84 MU/L (ref 0.4–4)

## 2019-11-04 PROCEDURE — 80048 BASIC METABOLIC PNL TOTAL CA: CPT | Performed by: INTERNAL MEDICINE

## 2019-11-04 PROCEDURE — 80061 LIPID PANEL: CPT | Performed by: INTERNAL MEDICINE

## 2019-11-04 PROCEDURE — 36415 COLL VENOUS BLD VENIPUNCTURE: CPT | Performed by: INTERNAL MEDICINE

## 2019-11-04 PROCEDURE — 84443 ASSAY THYROID STIM HORMONE: CPT | Performed by: INTERNAL MEDICINE

## 2019-11-04 ASSESSMENT — ENCOUNTER SYMPTOMS
POLYDIPSIA: 0
NECK PAIN: 0
SLEEP DISTURBANCES DUE TO BREATHING: 0
SINUS PAIN: 0
LOSS OF CONSCIOUSNESS: 0
NIGHT SWEATS: 1
MUSCLE WEAKNESS: 1
RECTAL PAIN: 0
DEPRESSION: 1
EXERCISE INTOLERANCE: 0
INCREASED ENERGY: 0
CLAUDICATION: 0
SMELL DISTURBANCE: 0
LIGHT-HEADEDNESS: 0
DYSPNEA ON EXERTION: 0
DOUBLE VISION: 0
ARTHRALGIAS: 1
TACHYCARDIA: 0
FATIGUE: 1
SWOLLEN GLANDS: 0
INSOMNIA: 1
SEIZURES: 0
EYE IRRITATION: 0
EXTREMITY NUMBNESS: 0
WHEEZING: 0
PARALYSIS: 0
DECREASED LIBIDO: 0
BREAST MASS: 0
DISTURBANCES IN COORDINATION: 0
EYE WATERING: 0
WEAKNESS: 0
HEMATURIA: 0
HOARSE VOICE: 0
NUMBNESS: 0
NAIL CHANGES: 0
BRUISES/BLEEDS EASILY: 0
WEIGHT LOSS: 0
DIZZINESS: 0
HYPOTENSION: 0
BREAST PAIN: 0
COUGH: 0
COUGH DISTURBING SLEEP: 0
NAUSEA: 0
CHILLS: 0
PANIC: 0
DECREASED CONCENTRATION: 1
FEVER: 0
STIFFNESS: 0
HEADACHES: 0
POSTURAL DYSPNEA: 0
TREMORS: 0
SNORES LOUDLY: 0
CONSTIPATION: 1
HALLUCINATIONS: 0
PALPITATIONS: 0
SINUS CONGESTION: 0
HEMOPTYSIS: 0
DIARRHEA: 0
DECREASED APPETITE: 0
ORTHOPNEA: 0
POLYPHAGIA: 0
VOMITING: 0
NECK MASS: 0
POOR WOUND HEALING: 0
RESPIRATORY PAIN: 0
ABDOMINAL PAIN: 0
LEG SWELLING: 0
SORE THROAT: 0
BACK PAIN: 1
HYPERTENSION: 0
EYE REDNESS: 0
WEIGHT GAIN: 0
SPUTUM PRODUCTION: 0
JOINT SWELLING: 0
MEMORY LOSS: 0
MUSCLE CRAMPS: 1
FLANK PAIN: 0
SPEECH CHANGE: 0
SKIN CHANGES: 0
HEARTBURN: 0
BLOATING: 1
BLOOD IN STOOL: 0
DYSURIA: 0
HOT FLASHES: 0
ALTERED TEMPERATURE REGULATION: 1
TROUBLE SWALLOWING: 0
LEG PAIN: 0
BOWEL INCONTINENCE: 0
SHORTNESS OF BREATH: 0
TINGLING: 0
SYNCOPE: 0
EYE PAIN: 0
MYALGIAS: 1
NERVOUS/ANXIOUS: 0
DIFFICULTY URINATING: 0
TASTE DISTURBANCE: 0
JAUNDICE: 0

## 2019-11-04 ASSESSMENT — ANXIETY QUESTIONNAIRES
2. NOT BEING ABLE TO STOP OR CONTROL WORRYING: NOT AT ALL
5. BEING SO RESTLESS THAT IT IS HARD TO SIT STILL: NOT AT ALL
6. BECOMING EASILY ANNOYED OR IRRITABLE: SEVERAL DAYS
1. FEELING NERVOUS, ANXIOUS, OR ON EDGE: SEVERAL DAYS
7. FEELING AFRAID AS IF SOMETHING AWFUL MIGHT HAPPEN: NOT AT ALL
3. WORRYING TOO MUCH ABOUT DIFFERENT THINGS: NOT AT ALL
GAD7 TOTAL SCORE: 2

## 2019-11-04 ASSESSMENT — MIFFLIN-ST. JEOR: SCORE: 1690.93

## 2019-11-04 ASSESSMENT — PATIENT HEALTH QUESTIONNAIRE - PHQ9
SUM OF ALL RESPONSES TO PHQ QUESTIONS 1-9: 3
5. POOR APPETITE OR OVEREATING: NOT AT ALL

## 2019-11-04 NOTE — PROGRESS NOTES
SUBJECTIVE:   CC: Adriana Lucia is an 49 year old woman who presents for preventive health visit.     Healthy Habits:    Do you get at least three servings of calcium containing foods daily (dairy, green leafy vegetables, etc.)? yes    Amount of exercise or daily activities, outside of work: walking daily    Problems taking medications regularly No    Medication side effects: No    Have you had an eye exam in the past two years? yes    Do you see a dentist twice per year? yes    Do you have sleep apnea, excessive snoring or daytime drowsiness?no      -------------------------------------    Today's PHQ-2 Score:   PHQ-2 ( 1999 [employer]) 11/4/2019 9/4/2018   Q1: Little interest or pleasure in doing things 0 0   Q2: Feeling down, depressed or hopeless 0 0   PHQ-2 Score 0 0   Q1: Little interest or pleasure in doing things - Not at all   Q2: Feeling down, depressed or hopeless - Not at all   PHQ-2 Score - 0       Abuse: Current or Past(Physical, Sexual or Emotional)- No  Do you feel safe in your environment? Yes        Social History     Tobacco Use     Smoking status: Never Smoker     Smokeless tobacco: Former User   Substance Use Topics     Alcohol use: Yes     Alcohol/week: 2.0 standard drinks     If you drink alcohol do you typically have >3 drinks per day or >7 drinks per week? No                     Reviewed orders with patient.  Reviewed health maintenance and updated orders accordingly - Yes  Labs reviewed in EPIC    Mammogram Screening: Patient over age 50, mutual decision to screen reflected in health maintenance.    Pertinent mammograms are reviewed under the imaging tab.  History of abnormal Pap smear: NO - age 30-65 PAP every 5 years with negative HPV co-testing recommended  PAP / HPV Latest Ref Rng & Units 9/5/2018 11/24/2014   PAP - NIL NIL   HPV 16 DNA NEG:Negative Negative -   HPV 18 DNA NEG:Negative Negative -   OTHER HR HPV NEG:Negative Negative -     Reviewed and updated as needed this visit by  clinical staff  Tobacco  Meds         Reviewed and updated as needed this visit by Provider        Past Medical History:   Diagnosis Date     HSV (herpes simplex virus) infection      Hyperlipidemia      Hypertension       Past Surgical History:   Procedure Laterality Date      SECTION N/A 2015    Procedure:  SECTION;  Surgeon: Meredith Clark MD;  Location:  L+D     wisdom teeth             ROS:  Review of Systems     Constitutional:  Positive for fatigue, night sweats and recent stressors. Negative for fever, chills, weight loss, weight gain, decreased appetite, height loss, post-operative complications, incisional pain, hallucinations, increased energy, hyperactivity and confused.   HENT:  Negative for ear pain, hearing loss, tinnitus, nosebleeds, trouble swallowing, hoarse voice, mouth sores, sore throat, ear discharge, tooth pain, gum tenderness, taste disturbance, smell disturbance, hearing aid, bleeding gums, dry mouth, sinus pain, sinus congestion and neck mass.    Eyes:  Negative for double vision, pain, redness, eye pain, decreased vision, eye watering, eye bulging, eye dryness, flashing lights, spots, floaters, strabismus, tunnel vision, jaundice and eye irritation.   Respiratory:   Negative for cough, hemoptysis, sputum production, shortness of breath, wheezing, sleep disturbances due to breathing, snores loudly, respiratory pain, dyspnea on exertion, cough disturbing sleep and postural dyspnea.    Cardiovascular:  Negative for chest pain, dyspnea on exertion, palpitations, orthopnea, claudication, leg swelling, fingers/toes turn blue, hypertension, hypotension, syncope, history of heart murmur, chest pain on exertion, chest pain at rest, pacemaker, few scattered varicosities, leg pain, sleep disturbances due to breathing, tachycardia, light-headedness, exercise intolerance and edema.   Gastrointestinal:  Positive for constipation and bloating. Negative for  "heartburn, nausea, vomiting, abdominal pain, diarrhea, blood in stool, melena, rectal pain, bowel incontinence, jaundice, coffee ground emesis and change in stool.   Genitourinary:  Positive for bladder incontinence and nocturia. Negative for dysuria, urgency, hematuria, flank pain, vaginal discharge, difficulty urinating, genital sores, dyspareunia, decreased libido, voiding less frequently, arousal difficulty, abnormal vaginal bleeding, excessive menstruation, menstrual changes, hot flashes, vaginal dryness and postmenopausal bleeding.   Musculoskeletal:  Positive for myalgias, back pain, arthralgias, muscle cramps and muscle weakness. Negative for joint swelling, stiffness, neck pain, bone pain and fracture.   Skin:  Negative for nail changes, itching, poor wound healing, rash, hair changes, skin changes, acne, warts, poor wound healing, scarring, flaky skin, Raynaud's phenomenon, sensitivity to sunlight and skin thickening.   Neurological:  Negative for dizziness, tingling, tremors, speech change, seizures, loss of consciousness, weakness, light-headedness, numbness, headaches, disturbances in coordination, extremity numbness, memory loss, difficulty walking and paralysis.   Endo/Heme:  Negative for anemia, swollen glands and bruises/bleeds easily.   Psychiatric/Behavioral:  Positive for depression, decreased concentration and mood swings. Negative for hallucinations, memory loss and panic attacks.    Breast:  Negative for breast discharge, breast mass, breast pain and nipple retraction.   Endocrine:  Positive for altered temperature regulation.Negative for polyphagia, polydipsia, unwanted hair growth and change in facial hair.        OBJECTIVE:   /84   Pulse 85   Ht 1.702 m (5' 7\")   Wt 102.8 kg (226 lb 11.2 oz)   LMP 11/03/2019   Breastfeeding? No   BMI 35.51 kg/m    EXAM:  GENERAL: healthy, alert and no distress  EYES: Eyes grossly normal to inspection, PERRL and conjunctivae and sclerae " "normal  HENT: mouth without ulcers or lesions  NECK: no adenopathy, no asymmetry, masses, or scars and thyroid normal to palpation  MS: no gross musculoskeletal defects noted, no edema  SKIN: no suspicious lesions or rashes  NEURO: Normal strength and tone, mentation intact and speech normal  PSYCH: mentation appears normal, affect normal/bright    Diagnostic Test Results:  Labs reviewed in Epic    ASSESSMENT/PLAN:   1. Insomnia, unspecified type  Discussed causes of insomnia with patient.  Recommend consultation with sleep medicine.  We will also take check TSH today.  - TSH with free T4 reflex  - SLEEP EVALUATION & MANAGEMENT REFERRAL - ADULT -[facility] [place] [phone] (Age 15 and up); Future    2. Chronic hypertension  Blood pressure is currently at goal we will continue with current therapy without changes.  Will check lipid panel and basic chemistry panel today.  - Lipid Profile  - Basic Metabolic Panel    3. Routine general medical examination at a health care facility  Patient is up-to-date on age-appropriate cancer screening and vaccinations.      COUNSELING:   Reviewed preventive health counseling, as reflected in patient instructions       Regular exercise       Healthy diet/nutrition       Vision screening    Estimated body mass index is 35.51 kg/m  as calculated from the following:    Height as of this encounter: 1.702 m (5' 7\").    Weight as of this encounter: 102.8 kg (226 lb 11.2 oz).         reports that she has never smoked. She quit smokeless tobacco use about 5 years ago.      Counseling Resources:  ATP IV Guidelines  Pooled Cohorts Equation Calculator  Breast Cancer Risk Calculator  FRAX Risk Assessment  ICSI Preventive Guidelines  Dietary Guidelines for Americans, 2010  USDA's MyPlate  ASA Prophylaxis  Lung CA Screening    Nadira Colon MD  [facility]   "

## 2019-11-04 NOTE — LETTER
11/4/2019       RE: Adriana Lucia  [address] [facility] [place] [address]     Dear Colleague,    Thank you for referring your patient, Adriana Lcuia, to the [facility]  at [facility]. Please see a copy of my visit note below.     SUBJECTIVE:   CC: Adriana Lucia is an 49 year old woman who presents for preventive health visit.     Healthy Habits:    Do you get at least three servings of calcium containing foods daily (dairy, green leafy vegetables, etc.)? yes    Amount of exercise or daily activities, outside of work: walking daily    Problems taking medications regularly No    Medication side effects: No    Have you had an eye exam in the past two years? yes    Do you see a dentist twice per year? yes    Do you have sleep apnea, excessive snoring or daytime drowsiness?no      -------------------------------------    Today's PHQ-2 Score:   PHQ-2 ( 1999 [employer]) 11/4/2019 9/4/2018   Q1: Little interest or pleasure in doing things 0 0   Q2: Feeling down, depressed or hopeless 0 0   PHQ-2 Score 0 0   Q1: Little interest or pleasure in doing things - Not at all   Q2: Feeling down, depressed or hopeless - Not at all   PHQ-2 Score - 0       Abuse: Current or Past(Physical, Sexual or Emotional)- No  Do you feel safe in your environment? Yes        Social History     Tobacco Use     Smoking status: Never Smoker     Smokeless tobacco: Former User   Substance Use Topics     Alcohol use: Yes     Alcohol/week: 2.0 standard drinks     If you drink alcohol do you typically have >3 drinks per day or >7 drinks per week? No                     Reviewed orders with patient.  Reviewed health maintenance and updated orders accordingly - Yes  Labs reviewed in EPIC    Mammogram Screening: Patient over age 50, mutual decision to screen reflected in health maintenance.    Pertinent mammograms are reviewed under the imaging tab.  History of abnormal Pap  smear: NO - age 30-65 PAP every 5 years with negative HPV co-testing recommended  PAP / HPV Latest Ref Rng & Units 2014   PAP - NIL NIL   HPV 16 DNA NEG:Negative Negative -   HPV 18 DNA NEG:Negative Negative -   OTHER HR HPV NEG:Negative Negative -     Reviewed and updated as needed this visit by clinical staff  Tobacco  Meds         Reviewed and updated as needed this visit by Provider        Past Medical History:   Diagnosis Date     HSV (herpes simplex virus) infection      Hyperlipidemia      Hypertension       Past Surgical History:   Procedure Laterality Date      SECTION N/A 2015    Procedure:  SECTION;  Surgeon: Meredith Clark MD;  Location:  L+D     wisdom teeth      -       ROS:  Review of Systems     Constitutional:  Positive for fatigue, night sweats and recent stressors. Negative for fever, chills, weight loss, weight gain, decreased appetite, height loss, post-operative complications, incisional pain, hallucinations, increased energy, hyperactivity and confused.   HENT:  Negative for ear pain, hearing loss, tinnitus, nosebleeds, trouble swallowing, hoarse voice, mouth sores, sore throat, ear discharge, tooth pain, gum tenderness, taste disturbance, smell disturbance, hearing aid, bleeding gums, dry mouth, sinus pain, sinus congestion and neck mass.    Eyes:  Negative for double vision, pain, redness, eye pain, decreased vision, eye watering, eye bulging, eye dryness, flashing lights, spots, floaters, strabismus, tunnel vision, jaundice and eye irritation.   Respiratory:   Negative for cough, hemoptysis, sputum production, shortness of breath, wheezing, sleep disturbances due to breathing, snores loudly, respiratory pain, dyspnea on exertion, cough disturbing sleep and postural dyspnea.    Cardiovascular:  Negative for chest pain, dyspnea on exertion, palpitations, orthopnea, claudication, leg swelling, fingers/toes turn blue, hypertension,  hypotension, syncope, history of heart murmur, chest pain on exertion, chest pain at rest, pacemaker, few scattered varicosities, leg pain, sleep disturbances due to breathing, tachycardia, light-headedness, exercise intolerance and edema.   Gastrointestinal:  Positive for constipation and bloating. Negative for heartburn, nausea, vomiting, abdominal pain, diarrhea, blood in stool, melena, rectal pain, bowel incontinence, jaundice, coffee ground emesis and change in stool.   Genitourinary:  Positive for bladder incontinence and nocturia. Negative for dysuria, urgency, hematuria, flank pain, vaginal discharge, difficulty urinating, genital sores, dyspareunia, decreased libido, voiding less frequently, arousal difficulty, abnormal vaginal bleeding, excessive menstruation, menstrual changes, hot flashes, vaginal dryness and postmenopausal bleeding.   Musculoskeletal:  Positive for myalgias, back pain, arthralgias, muscle cramps and muscle weakness. Negative for joint swelling, stiffness, neck pain, bone pain and fracture.   Skin:  Negative for nail changes, itching, poor wound healing, rash, hair changes, skin changes, acne, warts, poor wound healing, scarring, flaky skin, Raynaud's phenomenon, sensitivity to sunlight and skin thickening.   Neurological:  Negative for dizziness, tingling, tremors, speech change, seizures, loss of consciousness, weakness, light-headedness, numbness, headaches, disturbances in coordination, extremity numbness, memory loss, difficulty walking and paralysis.   Endo/Heme:  Negative for anemia, swollen glands and bruises/bleeds easily.   Psychiatric/Behavioral:  Positive for depression, decreased concentration and mood swings. Negative for hallucinations, memory loss and panic attacks.    Breast:  Negative for breast discharge, breast mass, breast pain and nipple retraction.   Endocrine:  Positive for altered temperature regulation.Negative for polyphagia, polydipsia, unwanted hair growth  "and change in facial hair.        OBJECTIVE:   /84   Pulse 85   Ht 1.702 m (5' 7\")   Wt 102.8 kg (226 lb 11.2 oz)   LMP 11/03/2019   Breastfeeding? No   BMI 35.51 kg/m     EXAM:  GENERAL: healthy, alert and no distress  EYES: Eyes grossly normal to inspection, PERRL and conjunctivae and sclerae normal  HENT: mouth without ulcers or lesions  NECK: no adenopathy, no asymmetry, masses, or scars and thyroid normal to palpation  MS: no gross musculoskeletal defects noted, no edema  SKIN: no suspicious lesions or rashes  NEURO: Normal strength and tone, mentation intact and speech normal  PSYCH: mentation appears normal, affect normal/bright    Diagnostic Test Results:  Labs reviewed in Epic    ASSESSMENT/PLAN:   1. Insomnia, unspecified type  Discussed causes of insomnia with patient.  Recommend consultation with sleep medicine.  We will also take check TSH today.  - TSH with free T4 reflex  - SLEEP EVALUATION & MANAGEMENT REFERRAL - [facility] [facility] [place] [phone] (Age 15 and up); Future    2. Chronic hypertension  Blood pressure is currently at goal we will continue with current therapy without changes.  Will check lipid panel and basic chemistry panel today.  - Lipid Profile  - Basic Metabolic Panel    3. Routine general medical examination at a health care facility  Patient is up-to-date on age-appropriate cancer screening and vaccinations.      COUNSELING:   Reviewed preventive health counseling, as reflected in patient instructions       Regular exercise       Healthy diet/nutrition       Vision screening    Estimated body mass index is 35.51 kg/m  as calculated from the following:    Height as of this encounter: 1.702 m (5' 7\").    Weight as of this encounter: 102.8 kg (226 lb 11.2 oz).    Reports that she has never smoked. She quit smokeless tobacco use about 5 years ago.    Counseling Resources:  ATP IV Guidelines  Pooled Cohorts Equation Calculator  Breast Cancer Risk " Calculator  FRAX Risk Assessment  ICSI Preventive Guidelines  Dietary Guidelines for Americans, 2010  Register [employer]Â®'s MyPlate  ASA Prophylaxis  Lung CA Screening    Nadira Colon MD  [facility]

## 2019-11-05 ASSESSMENT — ANXIETY QUESTIONNAIRES: GAD7 TOTAL SCORE: 2

## 2019-11-06 ENCOUNTER — PRE VISIT (OUTPATIENT)
Dept: SLEEP MEDICINE | Facility: CLINIC | Age: 49
End: 2019-11-06

## 2019-11-07 ENCOUNTER — OFFICE VISIT (OUTPATIENT)
Dept: SLEEP MEDICINE | Facility: CLINIC | Age: 49
End: 2019-11-07
Attending: INTERNAL MEDICINE
Payer: COMMERCIAL

## 2019-11-07 VITALS
HEART RATE: 67 BPM | DIASTOLIC BLOOD PRESSURE: 98 MMHG | RESPIRATION RATE: 18 BRPM | BODY MASS INDEX: 35.47 KG/M2 | WEIGHT: 226 LBS | OXYGEN SATURATION: 99 % | HEIGHT: 67 IN | SYSTOLIC BLOOD PRESSURE: 133 MMHG

## 2019-11-07 DIAGNOSIS — N94.6 MENORRHALGIA: ICD-10-CM

## 2019-11-07 DIAGNOSIS — G25.81 RESTLESS LEGS SYNDROME (RLS): Primary | ICD-10-CM

## 2019-11-07 DIAGNOSIS — F51.04 PSYCHOPHYSIOLOGIC INSOMNIA: ICD-10-CM

## 2019-11-07 DIAGNOSIS — R06.83 SNORING: ICD-10-CM

## 2019-11-07 PROCEDURE — 99202 OFFICE O/P NEW SF 15 MIN: CPT | Performed by: NURSE PRACTITIONER

## 2019-11-07 ASSESSMENT — MIFFLIN-ST. JEOR: SCORE: 1687.76

## 2019-11-07 NOTE — PATIENT INSTRUCTIONS
"The following is recommended:    Fasting ferritin and TIBC. CBC w/ dif and plate  Other treatments that can work well is developing a routine of stretching legs, warm bath, massage of legs with lotion, all before bedtime.  Avoiding excessive caffeine, alcohol can help as well.        Ferrous sulfate 325mg and 500mg of vitamin C _____ daily. Most common side effects is constipation, which can be treated with increasing water and fiber or adding miralax if needed.   An iron infusion is also a possibility, but there are some side effects of this treatment with rare but possible anaphylaxis, so most of my patients elect ferrous sulfate and add gabapentin till the ferritin level rises.    Gabapentin 100mg pill, 1.5 hrs before bedtime. 1 pill for 3-5 days, if needed increase to 2 pills, then after 3-5 days can increase to 3 pills if needed.  No driving after taking gabapentin.     Let me know what pharmacy you would like this to go to. [place]    -----------------------------------------------------------------------------------------------------------------    Bed is for sleep or sex, unwind elsewhere enter bed when sleepy.  Avoid devices within 45 minutes  Time spent in bed:  Reduce time in bed not devoted to sleep  Worry time: 3 hrs before bedtime.  Journal : list: to do list for next day, for week, thoughts/feelings/concerns  With wakeup: \"i've already done this work\"  Breathing  4/8 breathing,   Iphone: insight timer-ideas on mental distraction, visualization (prac during daytime  Cover your clock-no cell phone      "

## 2019-11-07 NOTE — PROGRESS NOTES
CC: I have been asked to see Adriana Khari, who prefers to be called Adriana in consultation by Dr. Kimmy Healy for difficulties with fragmented sleep    HPI:Symptoms: Reports at least a 1 year history of fragmented sleep she is unsure if it little possibly related noise related or is it something about her to rollover and try to reinitiate sleep this happens multiple times and is affecting her total sleep time.  Her bed partner does report that she may snore once a week and on occasion Adriana complaints of a headache.  Sleeps on back and sides uncertain if it is worse if on the back.  No symptoms reflective of narcolepsy or parasomnias.  At least one to 2 out of 7 nights a week she will develop RLS, a feeling that she has to keep moving her legs, at the beginning of her nights rest.  This is been going on since he was pregnant she did not have this all the time and was not as awful as it becomes currently.  She does awaken her some hurt self sometimes by kicking but not often and Linnen is in disarray.  Usually with her set wake ups her RLS sensations climb.  Currently is having difficulties with menorrhagia.    Schedule: Starts work at 9 AM or 10 AM, is a night owl  Enters bed weekday 10:30 PM-12 AM, enters bed weekends 12 AM-1 AM  Lights out after 60 minutes of being in bed  Sleep latency 30 minutes  Exits bed weekday 7 AM-730-8:30 AM, exists bed weekends 8:30 AM-930-10 AM (rare for 10 AM)  Wakeups: 3-4 times per night taking 15-30 minutes to re-fall back asleep.  3/7 nights per week wake ups required greater than 30 minutes to return to sleep.  Return to sleep 15-30 minutes  Activities mid night with wakeups: Does worry (tries not to), tries to avoid but may check the clock or  phone, extend at end of sleep on weekends when off  Total sleep time: 5-6 hours on work nights, 6-7 on weekends.  Does have set wake up times approximately 3:30 AM, 4 AM, and 6:30 AM.  Feels her best if she gets 7-8  hours  Activities in bed do include the following: Reading, eating, watching TV goes off on a timer, and on occasion using a phone.  Naps no  No sleep medications:     Studies:  No sleep:   No PFTs:   No Echo      Personal, social and Family hx: She is single, lives with her mother, son, and partner.  Works as a pharmacy technician.  Sleep environment can be disruptive of sleep from noise (they do use a fan that works sometimes) and bed partner snoring.  Family history shows mother and grandmother both snore, mother is using CPAP which is working for her when she uses it.  Does drink alcohol 2 or 3 drinks ending at about 10 PM on one weekend night occasional use of marijuana, no use of other recreational drugs.  Does have a double shot of caffeine in the morning with work, [place] Sleepiness Scale today is 4/24, denies difficulty with driving, does report that sleepiness at work can affect concentration memory and her productivity.  20 out of 30 days she is tired and fatigued, 4 to 5 days out of the past 30 seems to have some low mood centered around her menstrual cycle.        ROS: 14 point, comprehensive ROS is completed and negative with exception of the following: Cardiovascular has hypertension, nervous system headache, digestive some constipation, urinary tract urinates more than normal and he is on a medication for that.  Surgical history , medical conditions hypertension, high cholesterol, medicines atorvastatin enalapril vitamin D and Myrbetriq      Allergies:    No Known Allergies    Medications:    Current Outpatient Medications   Medication Sig Dispense Refill     atorvastatin (LIPITOR) 20 MG tablet TAKE ONE TABLET BY MOUTH ONE TIME DAILY  30 tablet 2     CALCIUM PO        enalapril (VASOTEC) 20 MG tablet Take 1 tablet (20 mg) by mouth daily 30 tablet 2     mirabegron (MYRBETRIQ) 50 MG 24 hr tablet Take 1 tablet (50 mg) by mouth daily 90 tablet 1     SUMAtriptan (IMITREX) 50 MG tablet take 1  tablet by mouth at onset of headache for migraine, may repeat dose in 2 hours, max 200mg in 24 hours 9 tablet 0     VITAMIN D, CHOLECALCIFEROL, PO Take 5,000 Units by mouth every other day         Problem List:  Patient Active Problem List    Diagnosis Date Noted     Somatic dysfunction of pelvic region 10/17/2018     Priority: Medium     Cherry angioma 2015     Priority: Medium     Skin cancer screening 2015     Priority: Medium     Multiple benign nevi 2015     Priority: Medium     Dermatofibroma 2015     Priority: Medium     Skin tag 2015     Priority: Medium     Chronic hypertension with superimposed preeclampsia 2015     Priority: Medium     S/P  section 2015     Priority: Medium     Chronic hypertension 2015     Priority: Medium     LGA >97th %ile at 36+6 (efw 3871 g) 2015     Priority: Medium     Class: Acute     High-risk pregnancy, AMA 2014     Priority: Medium     Class: Acute     14: Normal NT  Growth US q 3 wks beginning at 28 weeks  Will need weekly BPPs starting at 32 weeks  *please let Nandini Page know when IOL starts/[id number].[phone]*       HSV (herpes simplex virus) infection 2014     Priority: Medium     Genital; outbreaks once annually; discussed prophylaxsis starting at 36 weeks.       Cervical polyp 2014     Priority: Medium     Swedish removed polyp, no bleeding since       Elevated blood pressure reading without diagnosis of hypertension 2014     Priority: Medium     4/3/15: /84 today.  2.18.15: labetalol daily, stopped aspirin   Agrees to monitor daily; will schedule  appt with Dr Colon  2015:Baseline labs drawn-all nml        Vitamin D deficiency 2014     Priority: Medium     Taking 5,000 iU daily  Problem list name updated by automated process. Provider to review       Hyperlipidemia 2014     Priority: Medium     FH and previous history of  Problem list name updated by automated  process. Provider to review          Past Medical/Surgical History:  Past Medical History:   Diagnosis Date     HSV (herpes simplex virus) infection      Hyperlipidemia      Hypertension      Past Surgical History:   Procedure Laterality Date      SECTION N/A 2015    Procedure:  SECTION;  Surgeon: Meredith Clark MD;  Location: [facility]     wisdom teeth             Family History   Problem Relation Age of Onset     Asthma Father      Allergies Father      Lipids Father      Cerebrovascular Disease Father      Hypertension Father      Hyperlipidemia Father      Diabetes Father      Obesity Father      Alcohol/Drug Paternal Grandfather      Substance Abuse Paternal Grandfather      Allergies Mother      Lipids Mother      Thyroid Disease Mother      Other - See Comments Mother         DVT during preg      Hypertension Mother      Hyperlipidemia Mother      Anxiety Disorder Mother      Depression Mother      Obesity Mother      Lipids Maternal Grandmother      Hyperlipidemia Maternal Grandmother      Obesity Maternal Grandmother      Thyroid Disease Maternal Grandfather      Obesity Maternal Grandfather      Cancer No family hx of         no skin cancer     Social History     Socioeconomic History     Marital status: Single     Spouse name: Tai Bazan     Number of children: 0     Years of education: Not on file     Highest education level: Not on file   Occupational History     Occupation: [employer]     Employer: UNKNOWN   Social Needs     Financial resource strain: Not on file     Food insecurity:     Worry: Not on file     Inability: Not on file     Transportation needs:     Medical: Not on file     Non-medical: Not on file   Tobacco Use     Smoking status: Never Smoker     Smokeless tobacco: Former User   Substance and Sexual Activity     Alcohol use: Yes     Alcohol/week: 2.0 standard drinks     Drug use: No     Sexual activity: Yes     Partners: Male     Birth  control/protection: None   Lifestyle     Physical activity:     Days per week: Not on file     Minutes per session: Not on file     Stress: Not on file   Relationships     Social connections:     Talks on phone: Not on file     Gets together: Not on file     Attends Gnosticism service: Not on file     Active member of club or organization: Not on file     Attends meetings of clubs or organizations: Not on file     Relationship status: Not on file     Intimate partner violence:     Fear of current or ex partner: Not on file     Emotionally abused: Not on file     Physically abused: Not on file     Forced sexual activity: Not on file   Other Topics Concern      Service No     Blood Transfusions No     Caffeine Concern No     Comment: 2 cups a coffee per day     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 Considering biking     Bike Helmet Yes     Seat Belt Yes     Self-Exams Yes     Parent/sibling w/ CABG, MI or angioplasty before 65F 55M? Not Asked   Social History Narrative    How much exercise per week? 3-4 times weekly    How much calcium per day? Diet - yogurt daily       How much caffeine per day? 2 cups    How much vitamin D per day? 5000 IU plus diet    Do you/your family wear seatbelts?  Yes    Do you/your family use safety helmets? n/a    Do you/your family use sunscreen? Yes    Do you/your family keep firearms in the home? Yes    Do you/your family have a smoke detector(s)? Yes        Do you feel safe in your home? Yes    Has anyone ever touched you in an unwanted manner? No    Margoth Coronado LPN    6/20/14           Physical Examination:  Vitals: [facility] 11/03/2019   BMI= There is no height or weight on file to calculate BMI.    Physical Examination:  Vitals: BP  "(!) 133/98   Pulse 67   Resp 18   Ht 1.702 m (5' 7\")   Wt 102.5 kg (226 lb)   LMP 11/03/2019   SpO2 99%   BMI 35.40 kg/m    BMI= Body mass index is 35.4 kg/m .         [place] Total Score 11/7/2019   Total score - [place] 4       [place] Sleepiness Scale 4/24, RIGOBERTO scale 15/28.    GENERAL APPEARANCE: healthy, alert and no distress    Assessment/Plan: It is my impression that Adriana, a 49-year-old female who has delayed sleep phase disorder and a relatively early start time professionally is struggling with impact of early work start time superimposed on restless legs and psychophysiologic insomnia and the following plan of care has been developed:  (Other sleep diagnosis which maybe impacting restorative nature of sleep or cause of fatigue: Possible sleep disordered breathing)    1.  RLS: At least 1-2 out of 7 nights having difficulty with sleep initiation and unwinding prior to bed due to RLS.  Nonpharmacologic measures to reduce the symptoms (which have been useful in the past) have been discussed, ferritin with TIBC along with CBC with differential and platelets have been ordered and we will move forward with iron replacement as necessary and or use of gabapentin to help bridge with symptoms until ferritin has risen to an acceptable value between 75 and 100 ng/mL.  2.  Snoring: Thought to only occur maybe once a week and perhaps while supine only.  Will review if there is a lack of response to treatments #1 above #3 below  3.  Psychophysiologic insomnia: Adriana does exhibit worry, clock watching, extending time in bed on weekends when able when poor quality of sleep has occurred.  We have decided to move forward with the following behavioral measures #1 worry time #2 avoiding clock watching, #3 limiting total sleep time in bed used for unwinding and utilizing sleep diaries to determine if there is been an improvement in length of wake ups and total sleep time.  We will have a virtual visit and 3 weeks with " scanned sleep diaries to me prior to that visit.    The above note was dictated using voice recognition software. Although reviewed after completion, some word and grammatical error may remain . Please contact the author for any clarifications.      CC: Nadira Colon    ...Scanned in sleep consult note reviewed:

## 2019-11-11 ENCOUNTER — OFFICE VISIT (OUTPATIENT)
Dept: SLEEP MEDICINE | Facility: CLINIC | Age: 49
End: 2019-11-11
Payer: COMMERCIAL

## 2019-11-11 DIAGNOSIS — N94.6 MENORRHALGIA: ICD-10-CM

## 2019-11-11 DIAGNOSIS — G25.81 RESTLESS LEGS SYNDROME (RLS): Primary | ICD-10-CM

## 2019-11-11 DIAGNOSIS — G25.81 RESTLESS LEGS SYNDROME (RLS): ICD-10-CM

## 2019-11-11 LAB
BASOPHILS # BLD AUTO: 0 10E9/L (ref 0–0.2)
BASOPHILS NFR BLD AUTO: 0.9 %
DIFFERENTIAL METHOD BLD: ABNORMAL
EOSINOPHIL # BLD AUTO: 0.2 10E9/L (ref 0–0.7)
EOSINOPHIL NFR BLD AUTO: 3.4 %
ERYTHROCYTE [DISTWIDTH] IN BLOOD BY AUTOMATED COUNT: 12.5 % (ref 10–15)
FERRITIN SERPL-MCNC: 31 NG/ML (ref 8–252)
HCT VFR BLD AUTO: 36.4 % (ref 35–47)
HGB BLD-MCNC: 12.1 G/DL (ref 11.7–15.7)
IMM GRANULOCYTES # BLD: 0 10E9/L (ref 0–0.4)
IMM GRANULOCYTES NFR BLD: 0.2 %
IRON SATN MFR SERPL: 29 % (ref 15–46)
IRON SERPL-MCNC: 88 UG/DL (ref 35–180)
LYMPHOCYTES # BLD AUTO: 1.3 10E9/L (ref 0.8–5.3)
LYMPHOCYTES NFR BLD AUTO: 28.5 %
MCH RBC QN AUTO: 31.9 PG (ref 26.5–33)
MCHC RBC AUTO-ENTMCNC: 33.2 G/DL (ref 31.5–36.5)
MCV RBC AUTO: 96 FL (ref 78–100)
MONOCYTES # BLD AUTO: 0.3 10E9/L (ref 0–1.3)
MONOCYTES NFR BLD AUTO: 7.4 %
NEUTROPHILS # BLD AUTO: 2.7 10E9/L (ref 1.6–8.3)
NEUTROPHILS NFR BLD AUTO: 59.6 %
NRBC # BLD AUTO: 0 10*3/UL
NRBC BLD AUTO-RTO: 0 /100
PLATELET # BLD AUTO: 218 10E9/L (ref 150–450)
RBC # BLD AUTO: 3.79 10E12/L (ref 3.8–5.2)
TIBC SERPL-MCNC: 301 UG/DL (ref 240–430)
WBC # BLD AUTO: 4.5 10E9/L (ref 4–11)

## 2019-11-11 PROCEDURE — 83550 IRON BINDING TEST: CPT | Performed by: NURSE PRACTITIONER

## 2019-11-11 PROCEDURE — 83540 ASSAY OF IRON: CPT | Performed by: NURSE PRACTITIONER

## 2019-11-11 PROCEDURE — 82728 ASSAY OF FERRITIN: CPT | Performed by: NURSE PRACTITIONER

## 2019-11-11 PROCEDURE — 36415 COLL VENOUS BLD VENIPUNCTURE: CPT | Performed by: NURSE PRACTITIONER

## 2019-11-11 PROCEDURE — 85025 COMPLETE CBC W/AUTO DIFF WBC: CPT | Performed by: NURSE PRACTITIONER

## 2019-11-12 ENCOUNTER — DOCUMENTATION ONLY (OUTPATIENT)
Dept: SLEEP MEDICINE | Facility: CLINIC | Age: 49
End: 2019-11-12
Payer: COMMERCIAL

## 2019-11-12 ENCOUNTER — MYC MEDICAL ADVICE (OUTPATIENT)
Dept: SLEEP MEDICINE | Facility: CLINIC | Age: 49
End: 2019-11-12

## 2019-11-12 DIAGNOSIS — G47.00 INSOMNIA, UNSPECIFIED TYPE: ICD-10-CM

## 2019-11-12 DIAGNOSIS — G25.81 RESTLESS LEGS SYNDROME (RLS): Primary | ICD-10-CM

## 2019-11-12 DIAGNOSIS — R06.83 SNORING: ICD-10-CM

## 2019-11-12 RX ORDER — GABAPENTIN 100 MG/1
CAPSULE ORAL
Qty: 90 CAPSULE | Refills: 1 | Status: SHIPPED | OUTPATIENT
Start: 2019-11-12 | End: 2020-01-13

## 2019-11-12 NOTE — Clinical Note
KELLEE has been completed. Please interpret. Report in providers folder.Valdemar McculloughMount [facility] - [place]

## 2019-11-13 ENCOUNTER — MYC REFILL (OUTPATIENT)
Dept: OBGYN | Facility: CLINIC | Age: 49
End: 2019-11-13

## 2019-11-13 DIAGNOSIS — E78.5 HYPERLIPIDEMIA LDL GOAL <100: ICD-10-CM

## 2019-11-13 DIAGNOSIS — I10 ESSENTIAL HYPERTENSION, BENIGN: ICD-10-CM

## 2019-11-14 NOTE — PROGRESS NOTES
Overnight oximetry returned to clinic 11/12/2019 and results have been received. The encounter was routed to the provider for review. Copy of these results have also been scanned into chart.    Recording date: 11/11/2019    Duration: 09:46:20    Time (min) Spent Below 88%: 0    Completed on room air    Valdemar Hays  [facility] Specialist - [place]

## 2019-11-18 RX ORDER — ATORVASTATIN CALCIUM 20 MG/1
20 TABLET, FILM COATED ORAL DAILY
Qty: 90 TABLET | Refills: 0 | Status: SHIPPED | OUTPATIENT
Start: 2019-11-18 | End: 2020-02-07

## 2019-11-18 RX ORDER — ENALAPRIL MALEATE 20 MG/1
20 TABLET ORAL DAILY
Qty: 90 TABLET | Refills: 0 | Status: SHIPPED | OUTPATIENT
Start: 2019-11-18 | End: 2020-02-07

## 2019-12-03 ENCOUNTER — VIRTUAL VISIT (OUTPATIENT)
Dept: SLEEP MEDICINE | Facility: CLINIC | Age: 49
End: 2019-12-03
Payer: COMMERCIAL

## 2019-12-03 DIAGNOSIS — R06.83 SNORING: ICD-10-CM

## 2019-12-03 DIAGNOSIS — G47.00 INSOMNIA, UNSPECIFIED TYPE: ICD-10-CM

## 2019-12-03 DIAGNOSIS — G25.81 RESTLESS LEGS SYNDROME (RLS): Primary | ICD-10-CM

## 2019-12-03 PROCEDURE — 98966 PH1 ASSMT&MGMT NQHP 5-10: CPT | Performed by: NURSE PRACTITIONER

## 2019-12-03 NOTE — PROGRESS NOTES
HPI: Referred by Kimmy Healy  Baseline symptoms R LS (symptoms since pregnancy, have worsened over time)-currently having problems with menorrhagia  DSPD.    CC: virtual visit to review sleep diaries, success at following behavioral measures, and RLS    HPI:RLS 2/7 nights, has hurt herself with kicking. On Fe, has mennoragia  Snores: 1x/wk  Psychophysiologic insomnia: worry, clock watching, extending time in bed on weekends when able to make up for poor quality of sleep.    Sleep diaries were done with the following behavioral measures: Worry time, cover the clock, entering bed more when ready for sleep- not with unwinding    Bedtime:   Entered bed somewhere between 1030-1 AM                               sleep Latency: 30 minutes-1.5 hours  Wakeups: 1-10 for total time of 5 minutes- 30 minutes  Minutes woke up prior to final rise time: 2- 45-90 minutes  Final wakeup time: 8-10 AM  Total sleep time estimated somewhere between 7.25-9 hours  Naps: 0 (most of days)-20 minutes    Recommendations:  Behavioral measures   11/01/2019: Overnight oximetry total sleep time 9 hours 46 minutes, time O2 sat less than 88% 0, adjusted index of 4% desaturations of 10's seconds or longer 5.6/h (threshold 15) percent artifact 0.2    A/P: Telephone conversation was shortened by patient needing to return to work, it started late due to my fault.  I am unable to determine from the diaries as to whether total sleep time improved 1 week over another and what might be the reason for this.  Stimulus control obviously not completely used due to prolonged period of time spent trying to fall in bed on week 11/11- 11/17 and likely equally so on weeks 11/18-11/24 with a sleep latency of somewhere between 30 to 1.5 hours.  It is quite possible that untreated RLS prolongs her sleep latency.  1.RLS: Ferritin level 31 at this visit, hemoglobin: 12.1 with balance of H&H essentially normal with exception of RBC count slightly abnormal at 3.79.   Hemoglobin 2015 shows a range somewhere between 11.9-12 patient will likely benefit from adding gabapentin to her nighttime regimen to improve her ability to initiate sleep of RLS is the primary problem and prolonged sleep latency which I do not know because of the truncated virtual visit.  I will reach out to her via my chart.  2. Snoring: Infrequent and Adriana does not seem to have significant obstructive sleep apnea by overnight oximetry  3/ Insomnia: Rise times do not very significantly throughout this.  Initial time spent falling asleep seems to have a significant variance.  Wake ups are frequent but brief.  Will give further information on the behavioral measures and whether stimulus control was applied from the beginning of the night with these prolonged sleep latencies.    The above note was dictated using voice recognition software. Although reviewed after completion, some word and grammatical error may remain . Please contact the author for any clarifications.    Time spent with patient is 10minutes, of which >50% was spent in counseling, education and coordination of care.

## 2019-12-04 DIAGNOSIS — R06.83 SNORING: ICD-10-CM

## 2019-12-09 NOTE — TELEPHONE ENCOUNTER
CC: virtual visit to review sleep diaries, success at following behavioral measures, and RLS    HPI:RLS 2/7 nights, has hurt herself with kicking. On Fe, has mennoragia  Snores: 1x/wk  Psychophysiologic insomnia: worry, clock watching, extending time in bed on weekends when able to make up for poor quality of sleep.    Sleep diaries were done with the following behavioral measures: Worry time, cover the clock, entering bed more when ready for sleep- not with unwinding    Bedtime:   Entered bed somewhere between 1030-1 AM                               sleep Latency: 30 minutes-1.5 hours  Wakeups: 1-10 for total time of 5 minutes- 30 minutes  Minutes woke up prior to final rise time: 2- 45-90 minutes  Final wakeup time: 8-10 AM  Total sleep time estimated somewhere between 7.25-9 hours  Naps: 0 (most of days)-20 minutes      Recommendations:  11/01/2019: Overnight oximetry total sleep time 9 hours 46 minutes, time O2 sat less than 88% 0, adjusted index of 4% desaturations of 10's seconds or longer 5.6/h (threshold 15) percent artifact 0.2      A/P: Telephone conversation was interrupted by patient needing to return to work, it started late due to my fault.  I am unable to determine from the diaries as to whether total sleep time improved 1 week over another and what might be the reason for this.  Stimulus control obviously not completely used due to prolonged period of time spent trying to fall in bed on week 11/11- 11/17 and likely equally so on weeks 11/18-11/24 with a sleep latency of somewhere between 30 to 1.5 hours.  It is quite possible that untreated RLS prolongs her sleep latency.  1.RLS: Ferritin level 31 at this visit, hemoglobin: 12.1 with balance of H&H essentially normal with exception of RBC count slightly abnormal at 3.79.  Hemoglobin 2015 shows a range somewhere between 11.9-12 patient will likely benefit from adding gabapentin to her nighttime regimen to improve her ability to initiate sleep of RLS  is the primary problem and prolonged sleep latency which I do not know because of the truncated virtual visit.  I will reach out to her via my chart.  2. Snoring: Infrequent and Adriana does not seem to have significant obstructive sleep apnea by overnight oximetry  3/ Insomnia: Rise times do not very significantly throughout this.  Initial time spent falling asleep seems to have a significant variance.  Wake ups are frequent but brief.  Will give further information on the behavioral measures and whether stimulus control was applied from the beginning of the night with these prolonged sleep latencies.          This was a scheduled 15 minute phone call    The above note was dictated using voice recognition software. Although reviewed after completion, some word and grammatical error may remain . Please contact the author for any clarifications.    Time spent with patient is 10minutes, of which >50% was spent in counseling, education and coordination of care.

## 2019-12-30 DIAGNOSIS — N32.81 OVERACTIVE BLADDER: ICD-10-CM

## 2019-12-30 DIAGNOSIS — N39.41 URGE INCONTINENCE: ICD-10-CM

## 2019-12-30 RX ORDER — MIRABEGRON 50 MG/1
50 TABLET, EXTENDED RELEASE ORAL DAILY
Qty: 90 TABLET | Refills: 1 | Status: SHIPPED | OUTPATIENT
Start: 2019-12-30 | End: 2020-07-15

## 2019-12-30 NOTE — TELEPHONE ENCOUNTER
Refill requested for myrbetriq. Pt lasty seen by Dr. Hawk in May 2019. Sending 6 month supply to pharmacy to get through until pt sure for re-check/annual.

## 2020-01-10 NOTE — TELEPHONE ENCOUNTER
Pending Prescriptions:                       Disp   Refills    gabapentin (NEURONTIN) 100 MG capsule     90 cap*1            Si pill 1.5 hrs before bedtime for 3-5 nights, may           increase by 1 pill every 3-5 days to a total of 3           capsules  if needed    Last Written Prescription Date:  19  Last Fill Quantity: 90,   # refills: 1  Last Office Visit with G, P or [facility] prescribing provider: 19  Future Office visit:   No follow up scheduled at this time.    KIMANI Webster

## 2020-01-13 RX ORDER — GABAPENTIN 100 MG/1
CAPSULE ORAL
Qty: 90 CAPSULE | Refills: 1 | Status: SHIPPED | OUTPATIENT
Start: 2020-01-13 | End: 2020-03-16

## 2020-02-05 NOTE — PROCEDURES
PHYSICIAN INTERPRETATION   HOME OXIMETRY   Patient: Adriana Lucia  MRN: [id number]  YOB: 1970  Study Date: 11/01/19  Referring Physician: Nadira Colon  Ordering Physician: KANE Pickett CNP, MD     Conditions:  ,Room air  Quality: artifact noted 0.2     Measure [threshold]                 Time less than or equal to SpO2 88% [5 min]:  0 min  Duration monitoring [> 2 hours artifact free]:  9:46 hours                    4% O2 desat index [ > 15/ hour]:    5.6/ hour                    Pattern:       normal                                  Assessment:   Normal study    Recommendations:  The following changes in O2/PAP settings were ordered:  n/a    Diagnosis Code(s): KANE Pickett CNP, February 5, 2020

## 2020-02-07 ENCOUNTER — MYC REFILL (OUTPATIENT)
Dept: OBGYN | Facility: CLINIC | Age: 50
End: 2020-02-07

## 2020-02-07 DIAGNOSIS — E78.5 HYPERLIPIDEMIA LDL GOAL <100: ICD-10-CM

## 2020-02-07 DIAGNOSIS — I10 ESSENTIAL HYPERTENSION, BENIGN: ICD-10-CM

## 2020-02-12 RX ORDER — ENALAPRIL MALEATE 20 MG/1
20 TABLET ORAL DAILY
Qty: 90 TABLET | Refills: 3 | Status: SHIPPED | OUTPATIENT
Start: 2020-02-12 | End: 2020-12-18

## 2020-02-12 RX ORDER — ATORVASTATIN CALCIUM 20 MG/1
20 TABLET, FILM COATED ORAL DAILY
Qty: 90 TABLET | Refills: 3 | Status: SHIPPED | OUTPATIENT
Start: 2020-02-12 | End: 2020-12-18

## 2020-02-12 NOTE — TELEPHONE ENCOUNTER
Received refill request for Lipitor and enalapril.  Last in clinic November 2019. At that time it was discussed to continue with current therapy without changes. Refills sent.

## 2020-02-24 ENCOUNTER — OFFICE VISIT (OUTPATIENT)
Dept: ORTHOPEDICS | Facility: CLINIC | Age: 50
End: 2020-02-24
Payer: COMMERCIAL

## 2020-02-24 VITALS — HEIGHT: 66 IN | RESPIRATION RATE: 16 BRPM | BODY MASS INDEX: 36.32 KG/M2 | WEIGHT: 226 LBS

## 2020-02-24 DIAGNOSIS — M25.571 PAIN AND SWELLING OF RIGHT ANKLE: Primary | ICD-10-CM

## 2020-02-24 DIAGNOSIS — M25.471 PAIN AND SWELLING OF RIGHT ANKLE: Primary | ICD-10-CM

## 2020-02-24 ASSESSMENT — MIFFLIN-ST. JEOR: SCORE: 1671.88

## 2020-02-24 NOTE — PROGRESS NOTES
SPORTS & ORTHOPEDIC WALK-IN VISIT 2/24/2020    Primary Care Physician: Dr. Colon     Has pain and swelling.   Unsure if it is related to her slipping but not falling on ice. Tweaked right knee and was painful but about a week later the ankle pain started    Reason for visit:     What part of your body is injured / painful?  right ankle    What caused the injury /pain? Unsure    How long ago did your injury occur or pain begin? 2 weeks     What are your most bothersome symptoms? Pain and Swelling    How would you characterize your symptom?  shooting and throbing    What makes your symptoms better? Nothing    What makes your symptoms worse? Walking, Movement and Bending    Have you been previously seen for this problem? No    Medical History:    Any recent changes to your medical history? No    Any new medication prescribed since last visit? No    Have you had surgery on this body part before? No    Social History:    Occupation: Pharm tech     Handedness: Right    Exercise:     Review of Systems:    Do you have fever, chills, weight loss? No    Do you have any vision problems? No    Do you have any chest pain or edema? No    Do you have any shortness of breath or wheezing?  No    Do you have stomach problems? No    Do you have any numbness or focal weakness? No    Do you have diabetes? No    Do you have problems with bleeding or clotting? No    Do you have an rashes or other skin lesions? No

## 2020-02-24 NOTE — PROGRESS NOTES
[facility]  Orthopedics  Oscar Santamaria, DO  2020     Name: Adriana Lucia  MRN: [id number]  Age: 49 year old  : 1970  Referring provider: Referred Self     Chief Complaint: Pain of the Right Ankle     Date of Onset: 2 weeks ago    History of Present Illness:   Adriana Lucia is a 49 year old female who presents today for evaluation of right ankle pain. She is not certain if her symptoms are related to slipping on ice a few weeks ago. Patient states that she initally twisted her right knee, which was initially painful but resolved. Her ankle pain did not start until a week later. Today, she has pain on the lateral side of her ankle which wraps around her heel. She reports significant swelling in the morning, which usually reduces throughout the day. She has been wearing an ankle brace, but this almost makes her symptoms worse.     Review of Systems:   A 10-point review of systems was obtained and is negative except for as noted in the HPI.     Medications:      atorvastatin (LIPITOR) 20 MG tablet, Take 1 tablet (20 mg) by mouth daily, Disp: 90 tablet, Rfl: 3     CALCIUM PO, , Disp: , Rfl:      enalapril (VASOTEC) 20 MG tablet, Take 1 tablet (20 mg) by mouth daily, Disp: 90 tablet, Rfl: 3     gabapentin (NEURONTIN) 100 MG capsule, 1 pill 1.5 hrs before bedtime for 3-5 nights, may increase by 1 pill every 3-5 days to a total of 3 capsules  if needed, Disp: 90 capsule, Rfl: 1     mirabegron (MYRBETRIQ) 50 MG 24 hr tablet, Take 1 tablet (50 mg) by mouth daily, Disp: 90 tablet, Rfl: 1     SUMAtriptan (IMITREX) 50 MG tablet, take 1 tablet by mouth at onset of headache for migraine, may repeat dose in 2 hours, max 200mg in 24 hours, Disp: 9 tablet, Rfl: 0     VITAMIN D, CHOLECALCIFEROL, PO, Take 5,000 Units by mouth every other day, Disp: , Rfl:     Allergies:  Patient has no known allergies.     Past Medical History:  Past Medical History:   Diagnosis Date     HSV (herpes simplex virus) infection       "Hyperlipidemia      Hypertension        Past Surgical History:  Past Surgical History:   Procedure Laterality Date      SECTION N/A 2015    Procedure:  SECTION;  Surgeon: Meredith Clark MD;  Location: [facility] L+[facility]     wisdom teeth      -        Social History:  Social History     Tobacco Use     Smoking status: Never Smoker     Smokeless tobacco: Former User   Substance Use Topics     Alcohol use: Yes     Alcohol/week: 2.0 standard drinks     Drug use: No         Family History:  Family History   Problem Relation Age of Onset     Asthma Father      Allergies Father      Lipids Father      Cerebrovascular Disease Father      Hypertension Father      Hyperlipidemia Father      Diabetes Father      Obesity Father      Alcohol/Drug Paternal Grandfather      Substance Abuse Paternal Grandfather      Allergies Mother      Lipids Mother      Thyroid Disease Mother      Other - See Comments Mother         DVT during preg      Hypertension Mother      Hyperlipidemia Mother      Anxiety Disorder Mother      Depression Mother      Obesity Mother      Lipids Maternal Grandmother      Hyperlipidemia Maternal Grandmother      Obesity Maternal Grandmother      Thyroid Disease Maternal Grandfather      Obesity Maternal Grandfather      Cancer No family hx of         no skin cancer       Physical Examination:  Resp. rate 16, height 1.676 m (5' 6\"), weight 102.5 kg (226 lb), not currently breastfeeding.  General  - normal appearance, in no obvious distress  HEENT  -Pupils equal, round, no conjunctival injection.  No lid lag  CV  - normal pulses at posterior tib and dorsalis pedis  Pulm  - normal respiratory pattern, non-labored  Musculoskeletal - right ankle  - stance: normal gait without limp, no obvious leg length discrepancy  - inspection: Mild swelling at lateral malleolus and foot/ankle junction, normal bone and joint alignment, no obvious deformity  - palpation: Tenderness just posterior to " lateral malleolus. Tender at lateral aspect of calcaneus near perineal tendon.  - ROM: Full ROM. Pain with inversion  - strength: 5/5 in all planes  Neuro  - no sensory or motor deficit, grossly normal coordination, normal muscle tone  Skin  - no ecchymosis, erythema, warmth, or induration, no obvious rash  Psych  - interactive, appropriate, normal mood and affect    Imaging:   No new images.     Assessment:   49 year old female presenting with peroneal tendonitis of right ankle.    Plan:   -Ankle brace provided  -At-home exercises provided  -Ice therapy  -Ibuprofen   -Compression sock discussed for swelling  -Follow up 4 weeks if persisting    It was a pleasure seeing Adriana today.    Oscar Santamaria DO, [facility]  Primary Care Sports Medicine    I, Oscar Santamaria DO, have reviewed the above note and agree with the scribe's notation as written.    Scribe Disclosure:  Jennifer LÓPEZ, am serving as a scribe to document services personally performed by Oscar Santamaria DO at this visit, based upon the provider's statements to me. All documentation has been reviewed by the aforementioned provider prior to being entered into the official medical record.

## 2020-02-24 NOTE — LETTER
RE: Adriana Lucia  [address] [facility] [place] [address]     Dear Colleague,    Thank you for referring your patient, Adriana Lucia, to the [facility] SPORTS AND ORTHOPAEDIC WALK IN CLINIC at [facility]. Please see a copy of my visit note below.          SPORTS & ORTHOPEDIC WALK-IN VISIT 2020    Primary Care Physician: Dr. Colon     Has pain and swelling.   Unsure if it is related to her slipping but not falling on ice. Tweaked right knee and was painful but about a week later the ankle pain started    Reason for visit:     What part of your body is injured / painful?  right ankle    What caused the injury /pain? Unsure    How long ago did your injury occur or pain begin? 2 weeks     What are your most bothersome symptoms? Pain and Swelling    How would you characterize your symptom?  shooting and throbing    What makes your symptoms better? Nothing    What makes your symptoms worse? Walking, Movement and Bending    Have you been previously seen for this problem? No    Medical History:    Any recent changes to your medical history? No    Any new medication prescribed since last visit? No    Have you had surgery on this body part before? No    Social History:    Occupation: Pharm tech     Handedness: Right    Exercise:     Review of Systems:    Do you have fever, chills, weight loss? No    Do you have any vision problems? No    Do you have any chest pain or edema? No    Do you have any shortness of breath or wheezing?  No    Do you have stomach problems? No    Do you have any numbness or focal weakness? No    Do you have diabetes? No    Do you have problems with bleeding or clotting? No    Do you have an rashes or other skin lesions? No           [facility]  Orthopedics  Oscar Santamaria,   2020     Name: Adriana Lucia  MRN: [id number]  Age: 49 year old  : 1970  Referring provider: Referred Self     Chief Complaint: Pain of the Right  Ankle     Date of Onset: 2 weeks ago    History of Present Illness:   Adriana Lucia is a 49 year old female who presents today for evaluation of right ankle pain. She is not certain if her symptoms are related to slipping on ice a few weeks ago. Patient states that she initally twisted her right knee, which was initially painful but resolved. Her ankle pain did not start until a week later. Today, she has pain on the lateral side of her ankle which wraps around her heel. She reports significant swelling in the morning, which usually reduces throughout the day. She has been wearing an ankle brace, but this almost makes her symptoms worse.     Review of Systems:   A 10-point review of systems was obtained and is negative except for as noted in the HPI.     Medications:      atorvastatin (LIPITOR) 20 MG tablet, Take 1 tablet (20 mg) by mouth daily, Disp: 90 tablet, Rfl: 3     CALCIUM PO, , Disp: , Rfl:      enalapril (VASOTEC) 20 MG tablet, Take 1 tablet (20 mg) by mouth daily, Disp: 90 tablet, Rfl: 3     gabapentin (NEURONTIN) 100 MG capsule, 1 pill 1.5 hrs before bedtime for 3-5 nights, may increase by 1 pill every 3-5 days to a total of 3 capsules  if needed, Disp: 90 capsule, Rfl: 1     mirabegron (MYRBETRIQ) 50 MG 24 hr tablet, Take 1 tablet (50 mg) by mouth daily, Disp: 90 tablet, Rfl: 1     SUMAtriptan (IMITREX) 50 MG tablet, take 1 tablet by mouth at onset of headache for migraine, may repeat dose in 2 hours, max 200mg in 24 hours, Disp: 9 tablet, Rfl: 0     VITAMIN D, CHOLECALCIFEROL, PO, Take 5,000 Units by mouth every other day, Disp: , Rfl:     Allergies:  Patient has no known allergies.     Past Medical History:  Past Medical History:   Diagnosis Date     HSV (herpes simplex virus) infection      Hyperlipidemia      Hypertension        Past Surgical History:  Past Surgical History:   Procedure Laterality Date      SECTION N/A 2015    Procedure:  SECTION;  Surgeon: Aparna Camarillo  "Meredith Chisholm MD;  Location:  L+D     wisdom teeth      9-2011        Social History:  Social History     Tobacco Use     Smoking status: Never Smoker     Smokeless tobacco: Former User   Substance Use Topics     Alcohol use: Yes     Alcohol/week: 2.0 standard drinks     Drug use: No         Family History:  Family History   Problem Relation Age of Onset     Asthma Father      Allergies Father      Lipids Father      Cerebrovascular Disease Father      Hypertension Father      Hyperlipidemia Father      Diabetes Father      Obesity Father      Alcohol/Drug Paternal Grandfather      Substance Abuse Paternal Grandfather      Allergies Mother      Lipids Mother      Thyroid Disease Mother      Other - See Comments Mother         DVT during preg      Hypertension Mother      Hyperlipidemia Mother      Anxiety Disorder Mother      Depression Mother      Obesity Mother      Lipids Maternal Grandmother      Hyperlipidemia Maternal Grandmother      Obesity Maternal Grandmother      Thyroid Disease Maternal Grandfather      Obesity Maternal Grandfather      Cancer No family hx of         no skin cancer       Physical Examination:  Resp. rate 16, height 1.676 m (5' 6\"), weight 102.5 kg (226 lb), not currently breastfeeding.  General  - normal appearance, in no obvious distress  HEENT  -Pupils equal, round, no conjunctival injection.  No lid lag  CV  - normal pulses at posterior tib and dorsalis pedis  Pulm  - normal respiratory pattern, non-labored  Musculoskeletal - right ankle  - stance: normal gait without limp, no obvious leg length discrepancy  - inspection: Mild swelling at lateral malleolus and foot/ankle junction, normal bone and joint alignment, no obvious deformity  - palpation: Tenderness just posterior to lateral malleolus. Tender at lateral aspect of calcaneus near perineal tendon.  - ROM: Full ROM. Pain with inversion  - strength: 5/5 in all planes  Neuro  - no sensory or motor deficit, grossly normal " coordination, normal muscle tone  Skin  - no ecchymosis, erythema, warmth, or induration, no obvious rash  Psych  - interactive, appropriate, normal mood and affect    Imaging:   No new images.     Assessment:   49 year old female presenting with peroneal tendonitis of right ankle.    Plan:   -Ankle brace provided  -At-home exercises provided  -Ice therapy  -Ibuprofen   -Compression sock discussed for swelling  -Follow up 4 weeks if persisting    It was a pleasure seeing Adriana today.    Oscar Santamaria DO, [facility]  Primary Care Sports Medicine    I, Oscar Santamaria DO, have reviewed the above note and agree with the scribe's notation as written.    Scribe Disclosure:  I, Jennifer Baca, am serving as a scribe to document services personally performed by Oscar Santamaria DO at this visit, based upon the provider's statements to me. All documentation has been reviewed by the aforementioned provider prior to being entered into the official medical record.

## 2020-02-24 NOTE — PATIENT INSTRUCTIONS
Compression socks 20-30mm hg compression      PERONEAL TENDONITIS  What is a peroneal tendon injury?   A peroneal tendon injury is a problem with the tendons and muscles on the outer side of your lower leg and foot. Tendons are strong bands of tissue that attach muscle to bone. The peroneal tendons help keep your foot and ankle stable when you walk.   Tendons can be injured suddenly or they may be slowly damaged over time. You can have tiny or partial tears in your tendon. If you have a complete tear of your tendon, it is called a rupture. Other tendon injuries may be called a strain, tendinosis, or tendonitis.  What is the cause?   Peroneal injuries can be caused by:  Overuse of the tendon from a sport or work activity that causes your foot and ankle to roll inward, like when you run on sloped surfaces or run in shoes that are getting worn out on the outside of the heel.   A sudden activity that forces your foot upward toward your shin, like landing on your feet after a fall, or rolling your ankle on a rock while running.   What are the symptoms?   You may hear a pop or a snap when the injury happens. You may have pain and swelling on the outer side of your lower leg or ankle.   How is it diagnosed?   Your healthcare provider will examine you and ask about your symptoms, activities, and medical history. You may have X-rays or other scans.  How is it treated?   While you are recovering from your injury, you will need to change your sport or activity to one that will not make your condition worse. For example, you may need to swim instead of run.   Your healthcare provider may recommend stretching and strengthening exercises to help you heal.  Use an elastic bandage or an ankle brace as directed by your provider. You may need to use crutches until you can walk without pain.   The pain often gets better within a few weeks with self-care, but some injuries may take several months or longer to heal. It s important to  follow all of your healthcare provider s instructions.  How can I take care of myself?   To help relieve swelling and pain:  Put an ice pack, gel pack, or package of frozen vegetables wrapped in a cloth, on the area every 3 to 4 hours for up to 20 minutes at a time.   Do ice massage. To do this, first freeze water in a Styrofoam cup, then peel the top of the cup away to expose the ice. Hold the bottom of the cup and rub the ice over your tendon for 5 to 10 minutes. Do this several times a day while you have pain.   Keep your ankle up on a pillow when you sit or lie down.   Take pain medicine, such as acetaminophen, ibuprofen, or other medicine as directed by your provider. Nonsteroidal anti-inflammatory medicines (NSAIDs), such as ibuprofen, may cause stomach bleeding and other problems. These risks increase with age. Read the label and take as directed. Unless recommended by your healthcare provider, do not take for more than 10 days.  Moist heat may help relax your muscles and make it easier to move your leg. Put moist heat on the injured area for 10 to 15 minutes at a time before you do warm-up and stretching exercises. Moist heat includes heat patches or moist heating pads that you can purchase at most drugstores, a wet washcloth or towel that has been heated in the dryer, or a hot shower. Don t use heat if you have swelling.   Follow your healthcare provider's instructions, including any exercises recommended by your provider. Ask your provider:  How and when you will hear your test results   How long it will take to recover   What activities you should avoid, including how much you can lift, and when you can return to your normal activities   How to take care of yourself at home   What symptoms or problems you should watch for and what to do if you have them  Make sure you know when you should come back for a checkup.  How can I help prevent a peroneal tendon injury?   Warm-up exercises and stretching before  activities can help prevent injuries. For example, do exercises that keep your ankles and leg muscles strong. If your leg or ankle hurts after exercise, putting ice on it may help keep it from getting injured.   Follow safety rules and use any protective equipment recommended for your work or sport. For example, wear high-top athletic shoes or a supportive ankle brace. When you run, choose level surfaces and avoid rocks or holes.  Developed by [employer].  Published by [employer].  Copyright  2014 "[employer]," and/or one of its subsidiaries. All rights reserved.                Peroneal Tendon Exercises  You may start these exercises when you can stand comfortably on your injured leg with your heel resting on the floor and your full weight evenly distributed on both legs.  Towel stretch: Sit on a hard surface with your injured leg stretched out in front of you. Loop a towel around your toes and the ball of your foot and pull the towel toward your body keeping your leg straight. Hold this position for 15 to 30 seconds and then relax. Repeat 3 times.  When you don't feel much of a stretch using the towel, you can start the following exercises.  Standing calf stretch: Stand facing a wall with your hands on the wall at about eye level. Keep your injured leg back with your heel on the floor. Keep the other leg forward with the knee bent. Turn your back foot slightly inward (as if you were pigeon-toed). Slowly lean into the wall until you feel a stretch in the back of your calf. Hold the stretch for 15 to 30 seconds. Return to the starting position. Repeat 3 times. Do this exercise several times each day.   Standing soleus stretch: Stand facing a wall with your hands on the wall at about chest height. Keep your injured leg back with your heel on the floor. Keep the other leg forward with the knee bent. Turn your back foot slightly inward (as if you were pigeon-toed). Bend your back knee slightly and gently lean  into the wall until you feel a stretch in the lower calf of your injured leg. Hold the stretch for 15 to 30 seconds. Return to the starting position. Repeat 3 times.   Achilles stretch: Stand with the ball of one foot on a stair. Reach for the step below with your heel until you feel a stretch in the arch of your foot. Hold this position for 15 to 30 seconds and then relax. Repeat 3 times.   Heel raise: Stand behind a chair or counter with both feet flat on the floor. Using the chair or counter as a support, rise up onto your toes and hold for 5 seconds. Then slowly lower yourself down without holding onto the support. (It's OK to keep holding onto the support if you need to.) When this exercise becomes less painful, try doing this exercise while you are standing on the injured leg only. Repeat 15 times. Do 2 sets of 15. Rest 30 seconds between sets.   Step-up: Stand with the foot of your injured leg on a support 3 to 5 inches (8 to 13 centimeters) high --like a small step or block of wood. Keep your other foot flat on the floor. Shift your weight onto the injured leg on the support. Straighten your injured leg as the other leg comes off the floor. Return to the starting position by bending your injured leg and slowly lowering your uninjured leg back to the floor. Do 2 sets of 15.   Resisted ankle eversion: Sit with both legs stretched out in front of you, with your feet about a shoulder's width apart. Tie a loop in one end of elastic tubing. Put the foot of your injured leg through the loop so that the tubing goes around the arch of that foot and wraps around the outside of the other foot. Hold onto the other end of the tubing with your hand to provide tension. Turn the foot of your injured leg up and out. Make sure you keep your other foot still so that it will allow the tubing to stretch as you move the foot of your injured leg. Return to the starting position. Do 2 sets of 15.   Balance and reach exercises:  Stand next to a chair with your injured leg farther from the chair. The chair will provide support if you need it. Stand on the foot of your injured leg and bend your knee slightly. Try to raise the arch of this foot while keeping your big toe on the floor. Keep your foot in this position.   With the hand that is farther away from the chair, reach forward in front of you by bending at the waist. Avoid bending your knee any more as you do this. Repeat this 15 times. To make the exercise more challenging, reach farther in front of you. Do 2 sets of 15.   While keeping your arch raised, reach the hand that is farther away from the chair across your body toward the chair. The farther you reach, the more challenging the exercise. Do 2 sets of 15.  If you have access to a wobble board, do the following exercises:  Wobble board exercises   Stand on a wobble board with your feet shoulder-width apart.   Rock the board forwards and backwards 30 times, then side to side 30 times. Hold on to a chair if you need support.   Rotate the wobble board around so that the edge of the board is in contact with the floor at all times. Do this 30 times in a clockwise and then a counterclockwise direction.   Balance on the wobble board for as long as you can without letting the edges touch the floor. Try to do this for 2 minutes without touching the floor.   Rotate the wobble board in clockwise and counterclockwise circles, but do not let the edge of the board touch the floor.   When you have mastered the wobble exercises standing on both legs, try repeating them while standing on just your injured leg. After you are able to do these exercises on one leg, try to do them with your eyes closed. Make sure you have something nearby to support you in case you lose your balance.  Developed by [employer].  Published by [employer].  Copyright  2014 [employer] and/or one of its subsidiaries. All rights reserved.

## 2020-03-16 ENCOUNTER — MYC REFILL (OUTPATIENT)
Dept: SLEEP MEDICINE | Facility: CLINIC | Age: 50
End: 2020-03-16

## 2020-03-17 NOTE — TELEPHONE ENCOUNTER
Pending Prescriptions:                       Disp   Refills    gabapentin (NEURONTIN) 100 MG capsule     90 cap*1            Si pill 1.5 hrs before bedtime for 3-5 nights, may           increase by 1 pill every 3-5 days to a total of 3           capsules  if needed    Last Written Prescription Date:  20  Last Fill Quantity: 90,   # refills: 1  Last Office Visit with [facility], [facility] or [facility] prescribing provider: 19  Future Office visit: No follow up schedule at this time.    KIMANI Webster

## 2020-03-19 RX ORDER — GABAPENTIN 100 MG/1
CAPSULE ORAL
Qty: 90 CAPSULE | Refills: 1 | Status: SHIPPED | OUTPATIENT
Start: 2020-03-19 | End: 2020-05-27

## 2020-04-16 DIAGNOSIS — G25.81 RESTLESS LEGS SYNDROME (RLS): Primary | ICD-10-CM

## 2020-05-20 DIAGNOSIS — G25.81 RESTLESS LEGS SYNDROME (RLS): ICD-10-CM

## 2020-05-20 LAB
FERRITIN SERPL-MCNC: 73 NG/ML (ref 8–252)
IRON SATN MFR SERPL: 28 % (ref 15–46)
IRON SERPL-MCNC: 82 UG/DL (ref 35–180)
TIBC SERPL-MCNC: 289 UG/DL (ref 240–430)

## 2020-05-20 PROCEDURE — 83540 ASSAY OF IRON: CPT | Performed by: NURSE PRACTITIONER

## 2020-05-20 PROCEDURE — 36415 COLL VENOUS BLD VENIPUNCTURE: CPT | Performed by: NURSE PRACTITIONER

## 2020-05-20 PROCEDURE — 82728 ASSAY OF FERRITIN: CPT | Performed by: NURSE PRACTITIONER

## 2020-05-20 PROCEDURE — 83550 IRON BINDING TEST: CPT | Performed by: NURSE PRACTITIONER

## 2020-05-27 RX ORDER — GABAPENTIN 100 MG/1
CAPSULE ORAL
Qty: 90 CAPSULE | Refills: 1 | Status: SHIPPED | OUTPATIENT
Start: 2020-05-27 | End: 2020-07-31

## 2020-07-15 DIAGNOSIS — N32.81 OVERACTIVE BLADDER: ICD-10-CM

## 2020-07-15 DIAGNOSIS — N39.41 URGE INCONTINENCE: ICD-10-CM

## 2020-07-15 RX ORDER — MIRABEGRON 50 MG/1
50 TABLET, EXTENDED RELEASE ORAL DAILY
Qty: 90 TABLET | Refills: 1 | Status: SHIPPED | OUTPATIENT
Start: 2020-07-15 | End: 2020-12-21

## 2020-07-31 RX ORDER — GABAPENTIN 100 MG/1
CAPSULE ORAL
Qty: 90 CAPSULE | Refills: 1 | Status: SHIPPED | OUTPATIENT
Start: 2020-07-31 | End: 2020-10-02

## 2020-07-31 NOTE — TELEPHONE ENCOUNTER
Pending RX GABAPENTIN 100MG Caps, DISP:  90,  REFILL ^1,  SIG:  Take 1 capsule 1.5 hrs before bedtime for 3-5nights, may increase by 1 pill every 3-5 nights, to total of 3 capsules every night if needed.  Last Written Prescription Date:  5/27/2020  Last Fill Quantity: 90   # refills:1  Last office visit:  12/3/2019  Future Office visit:      Route to DR. Joseph, as Dania Mccrary CNP , not available     Routing refill request to provider for review/approval because:  Drug not on the [facility], [facility] or [facility] refill protocol or controlled substance

## 2020-10-02 RX ORDER — GABAPENTIN 300 MG/1
CAPSULE ORAL
Qty: 30 CAPSULE | Refills: 1 | Status: SHIPPED | OUTPATIENT
Start: 2020-10-02 | End: 2020-11-11

## 2020-10-02 NOTE — TELEPHONE ENCOUNTER
Spoke with Adriana.  She is taking 300 mg capsules each night.  She mentioned Dania Mccrary had wanted to switch her from 100 mg capsules to 300 mg capsules last RX, however, the RX was already filled with 100 mg capsules.  This is noted in [email or web address] message.    Adriana has made appointment to follow up medication with Dr. Rico Gu on 10/19/2020.    Pending RX Gabapentin 300 mg capsules, DISP:  30 caps, SIG:  Take 1 capsules  1.5 hours before bedtime.  R^1  Last Written Prescription Date:  7/31/2020  Last Fill Quantity: 90   # refills: 1  Last Office Visit: 12/2019  Future Office visit: 10/19/2020   Dr. Gu      Routing refill request to provider for review/approval because:  Drug not on the [facility], [facility] or [facility] refill protocol or controlled substance

## 2020-10-06 DIAGNOSIS — G43.109 MIGRAINE WITH AURA AND WITHOUT STATUS MIGRAINOSUS, NOT INTRACTABLE: ICD-10-CM

## 2020-10-09 RX ORDER — SUMATRIPTAN 50 MG/1
TABLET, FILM COATED ORAL
Qty: 12 TABLET | Refills: 0 | Status: SHIPPED | OUTPATIENT
Start: 2020-10-09 | End: 2021-07-22

## 2020-10-19 ENCOUNTER — VIRTUAL VISIT (OUTPATIENT)
Dept: SLEEP MEDICINE | Facility: CLINIC | Age: 50
End: 2020-10-19
Payer: COMMERCIAL

## 2020-10-19 DIAGNOSIS — G25.81 RESTLESS LEGS SYNDROME (RLS): Primary | ICD-10-CM

## 2020-10-19 PROCEDURE — 99214 OFFICE O/P EST MOD 30 MIN: CPT | Mod: 95

## 2020-10-19 RX ORDER — PRAMIPEXOLE DIHYDROCHLORIDE 0.25 MG/1
TABLET ORAL
Qty: 90 TABLET | Refills: 1 | Status: SHIPPED | OUTPATIENT
Start: 2020-10-19 | End: 2020-12-29

## 2020-10-19 NOTE — PROGRESS NOTES
"Adriana Lucia is a 50 year old female who is being evaluated via a billable video visit.      The patient has been notified of following:     \"This video visit will be conducted via a call between you and your physician/provider. We have found that certain health care needs can be provided without the need for an in-person physical exam.  This service lets us provide the care you need with a video conversation.  If a prescription is necessary we can send it directly to your pharmacy.  If lab work is needed we can place an order for that and you can then stop by our lab to have the test done at a later time.    Video visits are billed at different rates depending on your insurance coverage.  Please reach out to your insurance provider with any questions.    If during the course of the call the physician/provider feels a video visit is not appropriate, you will not be charged for this service.\"    Patient has given verbal consent for Video visit? Yes  How would you like to obtain your AVS? MyChart  If you are dropped from the video visit, the video invite should be resent to: Send to e-mail at: [email or web address]  Will anyone else be joining your video visit? No      Assessment  # Restless Leg Syndrome  Pt report of ongoing symptoms of leg discomfort when attempting to sleep continues to be consistent with restless leg syndrome. Low concern for sleep apnea given no reported symptoms suggestive of apnea as cause of waking during the night. Given little reported improvement in symptoms with gabapentin at this time, will plan to discontinue at this time. No taper required due to low dose of medication. Plan to switch to pramipexole as noted below for management of RLS. Per pt no personal history of addictive, compulsive, or impulsive disorders. After initial trial with pramipexole will evaluate for changes, may also consider interventions for psychophysiologic etiologies and associated treatment approaches given " "heightened psychosocial stressors for pt during the pandemic.     Plan  - Start pramipexole 0.25-0.3 mg at bedtime 1.5-2.0 hrs before bed.  - Stop gabapentin.  - Schedule follow-up visit in ~1 month to evaluate for improvement after starting pramipexole.    Subjective  Adirana Lucia is a 51 y/o female with a history of restless leg syndrome, hypertension, and hyperlipidemia presenting for follow-up of restless leg syndrome and medication management.    Reports that feeling of pain and discomfort in her legs at the end of the day has been ongoing, and continues to have difficulty falling asleep and staying asleep during the night. Continues to wake up multiple times to variable stimuli, describes herself as \"waking easily\" to noises or things going on. Feels that sleep disruption are somewhat stress dependent, and worsened by increased stressors at work and home. Notices that after prolonged time on her feet (working, tasks at home), once lying down in bed at night feels increased pain in her legs. Describes as \"achy\" pain that extends down her calves. Reports being able to tell when it will be a bad night for sleep when she gets \"shooting\" or \"radiating\" pain down her calves when trying to go to sleep for the night. Spends extended period of time while trying to fall asleep moving legs and adjusting to find a comfortable position for her legs. Feels overall problems with sleep are at the same level as before, no improvement or worsening to date.    Has been taking gabapentin 300 mg at night for sleep for at least 6 months at time of visit. Feels that it helps with falling asleep at night some nights, but does not help with staying asleep. Pt says that she is \"not convinced\" that it is the right medication for her.    Objective  Physical Exam  Constitutional:       Appearance: Normal appearance.   HENT:      Head: Normocephalic and atraumatic.   Pulmonary:      Effort: Pulmonary effort is normal.   Skin:     " Coloration: Skin is not jaundiced or pale.      Findings: No bruising or erythema.   Neurological:      Mental Status: She is alert and oriented to person, place, and time.   Psychiatric:         Mood and Affect: Mood normal.         Behavior: Behavior normal.         Thought Content: Thought content normal.         Judgment: Judgment normal.       Video-Visit Details    Type of service:  Video Visit    Video Start Time: 11:30AM  Video End Time: 12:02 PM    Originating Location (pt. Location): Home    Distant Location (provider location):  [facility]     Platform used for Video Visit: [facility]    Patient was seen and discussed with the Attending Physician, Dr. Brittanie Palafox, MS4 - [facility]    Scribe Disclosure:   I, Axel Palafox, MS4, am serving as a scribe; to document services personally performed by Dr. Rico Gu, based on data collection and the provider's statements to me.     Provider Disclosure:  Rico LÓPEZ, agree with above History, Review of Systems, Physical exam and Plan.  I have reviewed the content of the documentation and have edited it as needed. I have personally performed the services documented here and the documentation accurately represents those services and the decisions I have made.      I have spent 30 minutes with this patient today in which greater than 50% of this time was spent in the counseling / coordination of care.      Rico Gu MD

## 2020-10-19 NOTE — PATIENT INSTRUCTIONS

## 2020-11-11 ENCOUNTER — OFFICE VISIT (OUTPATIENT)
Dept: UROLOGY | Facility: CLINIC | Age: 50
End: 2020-11-11
Attending: OBSTETRICS & GYNECOLOGY
Payer: COMMERCIAL

## 2020-11-11 VITALS
HEIGHT: 66 IN | SYSTOLIC BLOOD PRESSURE: 129 MMHG | HEART RATE: 97 BPM | DIASTOLIC BLOOD PRESSURE: 85 MMHG | BODY MASS INDEX: 36.66 KG/M2 | WEIGHT: 228.1 LBS

## 2020-11-11 DIAGNOSIS — N32.81 OVERACTIVE BLADDER: Primary | ICD-10-CM

## 2020-11-11 DIAGNOSIS — E66.01 MORBID OBESITY (H): ICD-10-CM

## 2020-11-11 PROCEDURE — 99213 OFFICE O/P EST LOW 20 MIN: CPT | Performed by: OBSTETRICS & GYNECOLOGY

## 2020-11-11 RX ORDER — SOLIFENACIN SUCCINATE 5 MG/1
5 TABLET, FILM COATED ORAL DAILY
Qty: 90 TABLET | Refills: 3 | Status: SHIPPED | OUTPATIENT
Start: 2020-11-11 | End: 2022-04-18

## 2020-11-11 ASSESSMENT — PAIN SCALES - GENERAL: PAINLEVEL: NO PAIN (0)

## 2020-11-11 ASSESSMENT — MIFFLIN-ST. JEOR: SCORE: 1676.15

## 2020-11-11 NOTE — LETTER
"11/11/2020       RE: Adriana Lucia  [address] [facility] [place] [address]     Dear Colleague,    Thank you for referring your patient, Adriana Lucia, to the [facility] at [facility]. Please see a copy of my visit note below.    November 11, 2020    Return visit    Patient returns today for f/u. Since her last visit she has noted that her myrbetric is no longer working and she feels she is back to baseline. Discussed options of combined therapy with myrgetriq/vesicare versus SNS. Pt would prefer to try combined therapy for now.     /85   Pulse 97   Ht 1.676 m (5' 5.98\")   Wt 103.5 kg (228 lb 1.6 oz)   LMP 10/23/2020   BMI 36.83 kg/m    She is comfortable, in no distress, non-labored breathing.      A/P: 50 year old F with urgency/urge incontinence    Continue myrbetriq. Will add Vesicare 5mg po every day #90 RF3.    Virtual f/u in 2 months. Will consider SNS if still symptomatic.    A total of 15 minutes were spent with the patient today, > 50% in counseling and coordination of care    Eyad Hawk MD  Professor, OB/GYN  Urogynecologist    CC  Patient Care Team:  Nadira Colon MD as PCP - General (Internal Medicine)  Humera Becerra MD as MD (OB/Gyn)  Zeynep Stauffer PA-C as Physician Assistant (Physician Assistant)  Nadira Colon MD as MD (Internal Medicine)  Oscar Santamaria DO as Assigned Musculoskeletal Provider  Eyad Hawk MD as Assigned OBGYN Provider  SELF, REFERRED    "

## 2020-11-23 ENCOUNTER — VIRTUAL VISIT (OUTPATIENT)
Dept: SLEEP MEDICINE | Facility: CLINIC | Age: 50
End: 2020-11-23
Payer: COMMERCIAL

## 2020-11-23 VITALS — WEIGHT: 226 LBS | HEIGHT: 67 IN | BODY MASS INDEX: 35.47 KG/M2

## 2020-11-23 DIAGNOSIS — I10 CHRONIC HYPERTENSION: ICD-10-CM

## 2020-11-23 DIAGNOSIS — E66.01 MORBID OBESITY (H): Primary | ICD-10-CM

## 2020-11-23 DIAGNOSIS — G25.81 RESTLESS LEGS SYNDROME (RLS): ICD-10-CM

## 2020-11-23 PROCEDURE — 99213 OFFICE O/P EST LOW 20 MIN: CPT | Mod: 95 | Performed by: FAMILY MEDICINE

## 2020-11-23 ASSESSMENT — MIFFLIN-ST. JEOR: SCORE: 1682.76

## 2020-11-23 NOTE — PROGRESS NOTES
"Adriana Lucia is a 50 year old female who is being evaluated via a billable video visit.      The patient has been notified of following:     \"This video visit will be conducted via a call between you and your physician/provider. We have found that certain health care needs can be provided without the need for an in-person physical exam.  This service lets us provide the care you need with a video conversation.  If a prescription is necessary we can send it directly to your pharmacy.  If lab work is needed we can place an order for that and you can then stop by our lab to have the test done at a later time.    Video visits are billed at different rates depending on your insurance coverage.  Please reach out to your insurance provider with any questions.    If during the course of the call the physician/provider feels a video visit is not appropriate, you will not be charged for this service.\"    Patient has given verbal consent for Video visit? Yes  How would you like to obtain your AVS? MyChart  If you are dropped from the video visit, the video invite should be resent to: Text to cell phone: [phone]  Will anyone else be joining your video visit? No      Video-Visit Details    Type of service:  Video Visit    Video Start Time: 2:10pm  Video End Time: 2:20pm    Originating Location (pt. Location): Home    Distant Location (provider location):  [facility]     Platform used for Video Visit: Well    Virtual visit for follow-up of restless leg syndrome with change in medication from gabapentin to pramipexole.    Assessment:  -Restless leg syndrome    Plan:  -Ferritin 73 ng/mL on May 20, 2020  -Prior incomplete response to gabapentin 300-600 mg at bedtime  -Appears well controlled clinically with pramipexole 0.5 mg taken 1-2 hours before bed.  -Plan to continue pramipexole on current dose and plan for follow-up in 6 months.    50-year-old female with past medical history of " "restless leg syndrome, hypertension, hyperlipidemia.    Last office visit on October 19, 2020 and was her first visit with myself.  In summary, incomplete response to gabapentin 300-600 mg at bedtime.  History overall is consistent with restless leg syndrome.  Normal range ferritin of 73 ng/mL on May 20, 2020.  Plan to taper gabapentin and start trial of pramipexole 0.25-0.75 mg taken 1-2 hours before bed.    Today, she presents for follow-up of the trial of pramipexole.  She feels it is gone dramatically better and feels it is like a \"night and day difference\".  She has found the pramipexole at 0.5 mg dose has been highly effective and is not aware of any side effects.  She would like to continue the pramipexole and does not desire any changes.    10 point ROS of systems including Constitutional, Eyes, Respiratory, Cardiovascular, Gastroenterology, Genitourinary, Integumentary, Muscularskeletal, Psychiatric were all negative except for pertinent positives noted in my HPI.    Physical Exam  Constitutional:       General: She is not in acute distress.     Appearance: Normal appearance. She is not ill-appearing, toxic-appearing or diaphoretic.   HENT:      Head: Normocephalic and atraumatic.      Right Ear: External ear normal.      Left Ear: External ear normal.      Nose: Nose normal.   Eyes:      Conjunctiva/sclera: Conjunctivae normal.   Pulmonary:      Effort: Pulmonary effort is normal. No respiratory distress.   Skin:     Coloration: Skin is not jaundiced or pale.      Findings: No bruising or erythema.   Neurological:      General: No focal deficit present.      Mental Status: She is alert and oriented to person, place, and time.   Psychiatric:         Mood and Affect: Mood normal.         Behavior: Behavior normal.         Thought Content: Thought content normal.         Judgment: Judgment normal.           ---  This note was written with the assistance of the Dragon voice-dictation technology software. The " final document, although reviewed, may contain errors. For corrections, please contact the office.    Rico Gu MD    Sleep Medicine  [facility]  ([phone])  [facility]  ([phone])

## 2020-11-23 NOTE — PATIENT INSTRUCTIONS
Your BMI is Body mass index is 35.4 kg/m .  Weight management is a personal decision.  If you are interested in exploring weight loss strategies, the following discussion covers the approaches that may be successful. Body mass index (BMI) is one way to tell whether you are at a healthy weight, overweight, or obese. It measures your weight in relation to your height.  A BMI of 18.5 to 24.9 is in the healthy range. A person with a BMI of 25 to 29.9 is considered overweight, and someone with a BMI of 30 or greater is considered obese. More than two-thirds of American adults are considered overweight or obese.  Being overweight or obese increases the risk for further weight gain. Excess weight may lead to heart disease and diabetes.  Creating and following plans for healthy eating and physical activity may help you improve your health.  Weight control is part of healthy lifestyle and includes exercise, emotional health, and healthy eating habits. Careful eating habits lifelong are the mainstay of weight control. Though there are significant health benefits from weight loss, long-term weight loss with diet alone may be very difficult to achieve- studies show long-term success with dietary management in less than 10% of people. Attaining a healthy weight may be especially difficult to achieve in those with severe obesity. In some cases, medications, devices and surgical management might be considered.  What can you do?  If you are overweight or obese and are interested in methods for weight loss, you should discuss this with your provider.     Consider reducing daily calorie intake by 500 calories.     Keep a food journal.     Avoiding skipping meals, consider cutting portions instead.    Diet combined with exercise helps maintain muscle while optimizing fat loss. Strength training is particularly important for building and maintaining muscle mass. Exercise helps reduce stress, increase energy, and improves fitness.  Increasing exercise without diet control, however, may not burn enough calories to loose weight.       Start walking three days a week 10-20 minutes at a time    Work towards walking thirty minutes five days a week     Eventually, increase the speed of your walking for 1-2 minutes at time    In addition, we recommend that you review healthy lifestyles and methods for weight loss available through the National Institutes of Health patient information sites:  http://win.niddk.nih.gov/publications/index.htm    And look into health and wellness programs that may be available through your health insurance provider, employer, local community center, or [employer].    Weight management plan: Patient was referred to their PCP to discuss a diet and exercise plan.

## 2020-12-13 ASSESSMENT — ENCOUNTER SYMPTOMS
ABDOMINAL PAIN: 0
RECTAL PAIN: 0
JAUNDICE: 0
DECREASED LIBIDO: 1
HEARTBURN: 0
HOT FLASHES: 0
DYSURIA: 0
VOMITING: 0
PANIC: 0
INSOMNIA: 1
DEPRESSION: 0
BOWEL INCONTINENCE: 0
DIFFICULTY URINATING: 0
HEMATURIA: 0
BLOATING: 1
BLOOD IN STOOL: 0
DIARRHEA: 0
DECREASED CONCENTRATION: 0
FLANK PAIN: 0
NERVOUS/ANXIOUS: 0
NAUSEA: 0
CONSTIPATION: 1

## 2020-12-14 ENCOUNTER — TELEPHONE (OUTPATIENT)
Dept: SLEEP MEDICINE | Facility: CLINIC | Age: 50
End: 2020-12-14

## 2020-12-14 ENCOUNTER — OFFICE VISIT (OUTPATIENT)
Dept: INTERNAL MEDICINE | Facility: CLINIC | Age: 50
End: 2020-12-14
Attending: INTERNAL MEDICINE
Payer: COMMERCIAL

## 2020-12-14 ENCOUNTER — ANCILLARY PROCEDURE (OUTPATIENT)
Dept: MAMMOGRAPHY | Facility: CLINIC | Age: 50
End: 2020-12-14
Attending: INTERNAL MEDICINE
Payer: COMMERCIAL

## 2020-12-14 VITALS
HEART RATE: 98 BPM | DIASTOLIC BLOOD PRESSURE: 88 MMHG | SYSTOLIC BLOOD PRESSURE: 127 MMHG | HEIGHT: 67 IN | BODY MASS INDEX: 35.79 KG/M2 | WEIGHT: 228 LBS

## 2020-12-14 DIAGNOSIS — Z00.00 ROUTINE GENERAL MEDICAL EXAMINATION AT A HEALTH CARE FACILITY: ICD-10-CM

## 2020-12-14 DIAGNOSIS — Z12.31 VISIT FOR SCREENING MAMMOGRAM: ICD-10-CM

## 2020-12-14 DIAGNOSIS — F43.20 ADJUSTMENT DISORDER, UNSPECIFIED TYPE: ICD-10-CM

## 2020-12-14 DIAGNOSIS — Z00.00 PREVENTATIVE HEALTH CARE: Primary | ICD-10-CM

## 2020-12-14 LAB
ANION GAP SERPL CALCULATED.3IONS-SCNC: 8 MMOL/L (ref 3–14)
BUN SERPL-MCNC: 13 MG/DL (ref 7–30)
CALCIUM SERPL-MCNC: 9.1 MG/DL (ref 8.5–10.1)
CHLORIDE SERPL-SCNC: 107 MMOL/L (ref 94–109)
CHOLEST SERPL-MCNC: 178 MG/DL
CO2 SERPL-SCNC: 24 MMOL/L (ref 20–32)
CREAT SERPL-MCNC: 0.7 MG/DL (ref 0.52–1.04)
GFR SERPL CREATININE-BSD FRML MDRD: >90 ML/MIN/{1.73_M2}
GLUCOSE SERPL-MCNC: 95 MG/DL (ref 70–99)
HDLC SERPL-MCNC: 62 MG/DL
LDLC SERPL CALC-MCNC: 93 MG/DL
NONHDLC SERPL-MCNC: 116 MG/DL
POTASSIUM SERPL-SCNC: 3.9 MMOL/L (ref 3.4–5.3)
SODIUM SERPL-SCNC: 139 MMOL/L (ref 133–144)
TRIGL SERPL-MCNC: 114 MG/DL
TSH SERPL DL<=0.005 MIU/L-ACNC: 3.1 MU/L (ref 0.4–4)

## 2020-12-14 PROCEDURE — 99396 PREV VISIT EST AGE 40-64: CPT | Performed by: INTERNAL MEDICINE

## 2020-12-14 PROCEDURE — 36415 COLL VENOUS BLD VENIPUNCTURE: CPT | Performed by: INTERNAL MEDICINE

## 2020-12-14 PROCEDURE — 86803 HEPATITIS C AB TEST: CPT | Performed by: INTERNAL MEDICINE

## 2020-12-14 PROCEDURE — 77067 SCR MAMMO BI INCL CAD: CPT | Mod: 26 | Performed by: RADIOLOGY

## 2020-12-14 PROCEDURE — 80061 LIPID PANEL: CPT | Performed by: INTERNAL MEDICINE

## 2020-12-14 PROCEDURE — G0463 HOSPITAL OUTPT CLINIC VISIT: HCPCS | Mod: 25

## 2020-12-14 PROCEDURE — 77067 SCR MAMMO BI INCL CAD: CPT

## 2020-12-14 PROCEDURE — 84443 ASSAY THYROID STIM HORMONE: CPT | Performed by: INTERNAL MEDICINE

## 2020-12-14 PROCEDURE — 80048 BASIC METABOLIC PNL TOTAL CA: CPT | Performed by: INTERNAL MEDICINE

## 2020-12-14 ASSESSMENT — ENCOUNTER SYMPTOMS
STIFFNESS: 0
SINUS CONGESTION: 0
NAIL CHANGES: 0
LEG SWELLING: 0
SINUS PAIN: 0
DISTURBANCES IN COORDINATION: 0
SPUTUM PRODUCTION: 0
SHORTNESS OF BREATH: 0
ARTHRALGIAS: 0
SNORES LOUDLY: 0
BACK PAIN: 0
POSTURAL DYSPNEA: 0
FLANK PAIN: 0
PANIC: 0
DEPRESSION: 0
MYALGIAS: 0
COUGH DISTURBING SLEEP: 0
WEIGHT GAIN: 0
DYSURIA: 0
LOSS OF CONSCIOUSNESS: 0
EYE IRRITATION: 0
CONSTIPATION: 1
JAUNDICE: 0
HOT FLASHES: 0
NECK PAIN: 0
MUSCLE CRAMPS: 0
BLOOD IN STOOL: 0
DIARRHEA: 0
ABDOMINAL PAIN: 0
SWOLLEN GLANDS: 0
HEADACHES: 0
DIZZINESS: 0
INCREASED ENERGY: 0
FATIGUE: 0
POLYPHAGIA: 0
TROUBLE SWALLOWING: 0
EYE REDNESS: 0
LEG PAIN: 0
SPEECH CHANGE: 0
TINGLING: 0
EYE WATERING: 0
CLAUDICATION: 0
DIFFICULTY URINATING: 0
BRUISES/BLEEDS EASILY: 0
JOINT SWELLING: 0
HOARSE VOICE: 0
MEMORY LOSS: 0
DOUBLE VISION: 0
POOR WOUND HEALING: 0
WHEEZING: 0
HYPOTENSION: 0
TACHYCARDIA: 0
LIGHT-HEADEDNESS: 0
DYSPNEA ON EXERTION: 0
FEVER: 0
DECREASED APPETITE: 0
HEMATURIA: 0
EYE PAIN: 0
ALTERED TEMPERATURE REGULATION: 0
WEAKNESS: 0
NIGHT SWEATS: 0
SEIZURES: 0
POLYDIPSIA: 0
BLOATING: 1
EXTREMITY NUMBNESS: 0
HYPERTENSION: 0
NERVOUS/ANXIOUS: 0
HEARTBURN: 0
DECREASED LIBIDO: 1
WEIGHT LOSS: 0
PALPITATIONS: 0
TASTE DISTURBANCE: 0
SMELL DISTURBANCE: 0
BREAST PAIN: 0
INSOMNIA: 1
NAUSEA: 0
CHILLS: 0
NUMBNESS: 0
EXERCISE INTOLERANCE: 0
VOMITING: 0
BOWEL INCONTINENCE: 0
TREMORS: 0
SYNCOPE: 0
DECREASED CONCENTRATION: 0
SORE THROAT: 0
PARALYSIS: 0
HEMOPTYSIS: 0
ORTHOPNEA: 0
MUSCLE WEAKNESS: 0
HALLUCINATIONS: 0
SKIN CHANGES: 0
NECK MASS: 0
COUGH: 0
RESPIRATORY PAIN: 0
BREAST MASS: 0
RECTAL PAIN: 0
SLEEP DISTURBANCES DUE TO BREATHING: 0

## 2020-12-14 ASSESSMENT — PATIENT HEALTH QUESTIONNAIRE - PHQ9: SUM OF ALL RESPONSES TO PHQ QUESTIONS 1-9: 1

## 2020-12-14 ASSESSMENT — MIFFLIN-ST. JEOR: SCORE: 1691.83

## 2020-12-14 NOTE — LETTER
12/14/2020       RE: Adriana Lucia  [address] [facility] [place] [address]     Dear Colleague,    Thank you for referring your patient, Adriana Lucia, to the [facility] [place] at [facility]. Please see a copy of my visit note below.     SUBJECTIVE:   CC: Adriana Lucia is an 50 year old woman who presents for preventive health visit.       Patient has been advised of split billing requirements and indicates understanding: Yes  Healthy Habits:    Do you get at least three servings of calcium containing foods daily (dairy, green leafy vegetables, etc.)? yes    Amount of exercise or daily activities, outside of work: not regular    Problems taking medications regularly No    Medication side effects: constipation    Have you had an eye exam in the past two years? no    Do you see a dentist twice per year? no    Do you have sleep apnea, excessive snoring or daytime drowsiness?no      -------------------------------------    Today's PHQ-2 Score:   PHQ-2 ( 1999 [employer]) 2/24/2020 11/4/2019   Q1: Little interest or pleasure in doing things 0 0   Q2: Feeling down, depressed or hopeless 0 0   PHQ-2 Score 0 0   Q1: Little interest or pleasure in doing things - -   Q2: Feeling down, depressed or hopeless - -   PHQ-2 Score - -       Abuse: Current or Past(Physical, Sexual or Emotional)- No  Do you feel safe in your environment? Yes        Social History     Tobacco Use     Smoking status: Never Smoker     Smokeless tobacco: Former User   Substance Use Topics     Alcohol use: Yes     Alcohol/week: 2.0 standard drinks     If you drink alcohol do you typically have >3 drinks per day or >7 drinks per week? No                     Reviewed orders with patient.  Reviewed health maintenance and updated orders accordingly - Yes  Labs reviewed in EPIC    Mammogram Screening: Patient under age 50, mutual decision reflected in health  maintenance.      Pertinent mammograms are reviewed under the imaging tab.  History of abnormal Pap smear: NO - age 30-65 PAP every 5 years with negative HPV co-testing recommended  PAP / HPV Latest Ref Rng & Units 2014   PAP - NIL NIL   HPV 16 DNA NEG:Negative Negative -   HPV 18 DNA NEG:Negative Negative -   OTHER HR HPV NEG:Negative Negative -     Reviewed and updated as needed this visit by clinical staff  Tobacco  Allergies  Meds              Reviewed and updated as needed this visit by Provider                Past Medical History:   Diagnosis Date     HSV (herpes simplex virus) infection      Hyperlipidemia      Hypertension       Past Surgical History:   Procedure Laterality Date      SECTION N/A 2015    Procedure:  SECTION;  Surgeon: Meredith Clark MD;  Location: [facility] L+D     wisdom teeth      -       ROS:  Review of Systems     Constitutional:  Negative for fever, chills, weight loss, weight gain, fatigue, decreased appetite, night sweats, recent stressors, height gain, height loss, post-operative complications, incisional pain, hallucinations, increased energy, hyperactivity and confused.   HENT:  Negative for ear pain, hearing loss, tinnitus, nosebleeds, trouble swallowing, hoarse voice, mouth sores, sore throat, ear discharge, tooth pain, gum tenderness, taste disturbance, smell disturbance, hearing aid, bleeding gums, dry mouth, sinus pain, sinus congestion and neck mass.    Eyes:  Negative for double vision, pain, redness, eye pain, decreased vision, eye watering, eye bulging, eye dryness, flashing lights, spots, floaters, strabismus, tunnel vision, jaundice and eye irritation.   Respiratory:   Negative for cough, hemoptysis, sputum production, shortness of breath, wheezing, sleep disturbances due to breathing, snores loudly, respiratory pain, dyspnea on exertion, cough disturbing sleep and postural dyspnea.    Cardiovascular:  Negative for  chest pain, dyspnea on exertion, palpitations, orthopnea, claudication, leg swelling, fingers/toes turn blue, hypertension, hypotension, syncope, history of heart murmur, chest pain on exertion, chest pain at rest, pacemaker, few scattered varicosities, leg pain, sleep disturbances due to breathing, tachycardia, light-headedness, exercise intolerance and edema.   Gastrointestinal:  Positive for constipation and bloating. Negative for heartburn, nausea, vomiting, abdominal pain, diarrhea, blood in stool, melena, rectal pain, bowel incontinence, jaundice, coffee ground emesis and change in stool.   Genitourinary:  Positive for bladder incontinence, dyspareunia, decreased libido, nocturia, arousal difficulty, excessive menstruation and vaginal dryness. Negative for dysuria, urgency, hematuria, flank pain, vaginal discharge, difficulty urinating, genital sores, voiding less frequently, abnormal vaginal bleeding, menstrual changes, hot flashes and postmenopausal bleeding.   Musculoskeletal:  Negative for myalgias, back pain, joint swelling, arthralgias, stiffness, muscle cramps, neck pain, bone pain, muscle weakness and fracture.   Skin:  Negative for nail changes, itching, poor wound healing, rash, hair changes, skin changes, acne, warts, poor wound healing, scarring, flaky skin, Raynaud's phenomenon, sensitivity to sunlight and skin thickening.   Neurological:  Negative for dizziness, tingling, tremors, speech change, seizures, loss of consciousness, weakness, light-headedness, numbness, headaches, disturbances in coordination, extremity numbness, memory loss, difficulty walking and paralysis.   Endo/Heme:  Negative for anemia, swollen glands and bruises/bleeds easily.   Psychiatric/Behavioral:  Positive for mood swings. Negative for depression, hallucinations, memory loss, decreased concentration and panic attacks.    Breast:  Negative for breast discharge, breast mass, breast pain and nipple retraction.   Endocrine:   "Negative for altered temperature regulation, polyphagia, polydipsia, unwanted hair growth and change in facial hair.        OBJECTIVE:   /88   Pulse 98   Ht 1.702 m (5' 7\")   Wt 103.4 kg (228 lb)   BMI 35.71 kg/m    EXAM:  GENERAL: healthy, alert and no distress  EYES: Eyes grossly normal to inspection, PERRL and conjunctivae and sclerae normal  HENT: normal cephalic/atraumatic and oral mucous membranes moist  NECK: no adenopathy and no asymmetry, masses, or scars  RESP: lungs clear to auscultation - no rales, rhonchi or wheezes  CV: regular rate and rhythm, normal S1 S2, no S3 or S4, no murmur, click or rub, no peripheral edema and peripheral pulses strong  ABDOMEN: soft, nontender, no hepatosplenomegaly, no masses and bowel sounds normal  MS: no gross musculoskeletal defects noted, no edema  SKIN: no suspicious lesions or rashes  NEURO: Normal strength and tone, mentation intact and speech normal  PSYCH: mentation appears normal, affect normal/bright    Diagnostic Test Results:  Labs reviewed in Epic    ASSESSMENT/PLAN:   1. Preventative health care  Discussed preventive healthcare needs with patient.  Recommend lipid screening as well as testing for hepatitis C.  - Lipid Profile  - Basic metabolic panel  - Hepatitis C Screen Reflex to HCV RNA Quant and Genotype  - TSH with free T4 reflex    2. Adjustment disorder, unspecified type  Patient reports increased stress.  Discussed options for management, recommend counseling.  Referral was placed today.  - MENTAL HEALTH REFERRAL  - Adult; Outpatient Treatment; Individual/Couples/Family/Group Therapy/Health Psychology; [facility]: [facility] [phone]; We will contact you to schedule the appointment or please call with any questions    3. Routine general medical examination at a health care facility  Patient is up-to-date on age-appropriate cancer screening.      Patient has been advised of split billing requirements and indicates " "understanding: Yes  COUNSELING:   Reviewed preventive health counseling, as reflected in patient instructions       Regular exercise       Healthy diet/nutrition    Estimated body mass index is 35.71 kg/m  as calculated from the following:    Height as of this encounter: 1.702 m (5' 7\").    Weight as of this encounter: 103.4 kg (228 lb).        She reports that she has never smoked. She quit smokeless tobacco use about 6 years ago.      Counseling Resources:  ATP IV Guidelines  Pooled Cohorts Equation Calculator  Breast Cancer Risk Calculator  BRCA-Related Cancer Risk Assessment: FHS-7 Tool  FRAX Risk Assessment  ICSI Preventive Guidelines  Dietary Guidelines for Americans, 2010  USDA's MyPlate  ASA Prophylaxis  Lung CA Screening    Nadira Colon MD  [facility] [place]    "

## 2020-12-14 NOTE — PROGRESS NOTES
SUBJECTIVE:   CC: Adriana Lucia is an 50 year old woman who presents for preventive health visit.       Patient has been advised of split billing requirements and indicates understanding: Yes  Healthy Habits:    Do you get at least three servings of calcium containing foods daily (dairy, green leafy vegetables, etc.)? yes    Amount of exercise or daily activities, outside of work: not regular    Problems taking medications regularly No    Medication side effects: constipation    Have you had an eye exam in the past two years? no    Do you see a dentist twice per year? no    Do you have sleep apnea, excessive snoring or daytime drowsiness?no      -------------------------------------    Today's PHQ-2 Score:   PHQ-2 ( 1999 [employer]) 2/24/2020 11/4/2019   Q1: Little interest or pleasure in doing things 0 0   Q2: Feeling down, depressed or hopeless 0 0   PHQ-2 Score 0 0   Q1: Little interest or pleasure in doing things - -   Q2: Feeling down, depressed or hopeless - -   PHQ-2 Score - -       Abuse: Current or Past(Physical, Sexual or Emotional)- No  Do you feel safe in your environment? Yes        Social History     Tobacco Use     Smoking status: Never Smoker     Smokeless tobacco: Former User   Substance Use Topics     Alcohol use: Yes     Alcohol/week: 2.0 standard drinks     If you drink alcohol do you typically have >3 drinks per day or >7 drinks per week? No                     Reviewed orders with patient.  Reviewed health maintenance and updated orders accordingly - Yes  Labs reviewed in EPIC    Mammogram Screening: Patient under age 50, mutual decision reflected in health maintenance.      Pertinent mammograms are reviewed under the imaging tab.  History of abnormal Pap smear: NO - age 30-65 PAP every 5 years with negative HPV co-testing recommended  PAP / HPV Latest Ref Rng & Units 9/5/2018 11/24/2014   PAP - NIL NIL   HPV 16 DNA NEG:Negative Negative -   HPV 18 DNA NEG:Negative Negative -   OTHER HR HPV  NEG:Negative Negative -     Reviewed and updated as needed this visit by clinical staff  Tobacco  Allergies  Meds              Reviewed and updated as needed this visit by Provider                Past Medical History:   Diagnosis Date     HSV (herpes simplex virus) infection      Hyperlipidemia      Hypertension       Past Surgical History:   Procedure Laterality Date      SECTION N/A 2015    Procedure:  SECTION;  Surgeon: Meredith Clark MD;  Location:  L+D     wisdom teeth             ROS:  Review of Systems     Constitutional:  Negative for fever, chills, weight loss, weight gain, fatigue, decreased appetite, night sweats, recent stressors, height gain, height loss, post-operative complications, incisional pain, hallucinations, increased energy, hyperactivity and confused.   HENT:  Negative for ear pain, hearing loss, tinnitus, nosebleeds, trouble swallowing, hoarse voice, mouth sores, sore throat, ear discharge, tooth pain, gum tenderness, taste disturbance, smell disturbance, hearing aid, bleeding gums, dry mouth, sinus pain, sinus congestion and neck mass.    Eyes:  Negative for double vision, pain, redness, eye pain, decreased vision, eye watering, eye bulging, eye dryness, flashing lights, spots, floaters, strabismus, tunnel vision, jaundice and eye irritation.   Respiratory:   Negative for cough, hemoptysis, sputum production, shortness of breath, wheezing, sleep disturbances due to breathing, snores loudly, respiratory pain, dyspnea on exertion, cough disturbing sleep and postural dyspnea.    Cardiovascular:  Negative for chest pain, dyspnea on exertion, palpitations, orthopnea, claudication, leg swelling, fingers/toes turn blue, hypertension, hypotension, syncope, history of heart murmur, chest pain on exertion, chest pain at rest, pacemaker, few scattered varicosities, leg pain, sleep disturbances due to breathing, tachycardia, light-headedness, exercise  "intolerance and edema.   Gastrointestinal:  Positive for constipation and bloating. Negative for heartburn, nausea, vomiting, abdominal pain, diarrhea, blood in stool, melena, rectal pain, bowel incontinence, jaundice, coffee ground emesis and change in stool.   Genitourinary:  Positive for bladder incontinence, dyspareunia, decreased libido, nocturia, arousal difficulty, excessive menstruation and vaginal dryness. Negative for dysuria, urgency, hematuria, flank pain, vaginal discharge, difficulty urinating, genital sores, voiding less frequently, abnormal vaginal bleeding, menstrual changes, hot flashes and postmenopausal bleeding.   Musculoskeletal:  Negative for myalgias, back pain, joint swelling, arthralgias, stiffness, muscle cramps, neck pain, bone pain, muscle weakness and fracture.   Skin:  Negative for nail changes, itching, poor wound healing, rash, hair changes, skin changes, acne, warts, poor wound healing, scarring, flaky skin, Raynaud's phenomenon, sensitivity to sunlight and skin thickening.   Neurological:  Negative for dizziness, tingling, tremors, speech change, seizures, loss of consciousness, weakness, light-headedness, numbness, headaches, disturbances in coordination, extremity numbness, memory loss, difficulty walking and paralysis.   Endo/Heme:  Negative for anemia, swollen glands and bruises/bleeds easily.   Psychiatric/Behavioral:  Positive for mood swings. Negative for depression, hallucinations, memory loss, decreased concentration and panic attacks.    Breast:  Negative for breast discharge, breast mass, breast pain and nipple retraction.   Endocrine:  Negative for altered temperature regulation, polyphagia, polydipsia, unwanted hair growth and change in facial hair.        OBJECTIVE:   /88   Pulse 98   Ht 1.702 m (5' 7\")   Wt 103.4 kg (228 lb)   BMI 35.71 kg/m    EXAM:  GENERAL: healthy, alert and no distress  EYES: Eyes grossly normal to inspection, PERRL and conjunctivae " "and sclerae normal  HENT: normal cephalic/atraumatic and oral mucous membranes moist  NECK: no adenopathy and no asymmetry, masses, or scars  RESP: lungs clear to auscultation - no rales, rhonchi or wheezes  CV: regular rate and rhythm, normal S1 S2, no S3 or S4, no murmur, click or rub, no peripheral edema and peripheral pulses strong  ABDOMEN: soft, nontender, no hepatosplenomegaly, no masses and bowel sounds normal  MS: no gross musculoskeletal defects noted, no edema  SKIN: no suspicious lesions or rashes  NEURO: Normal strength and tone, mentation intact and speech normal  PSYCH: mentation appears normal, affect normal/bright    Diagnostic Test Results:  Labs reviewed in Epic    ASSESSMENT/PLAN:   1. Preventative health care  Discussed preventive healthcare needs with patient.  Recommend lipid screening as well as testing for hepatitis C.  - Lipid Profile  - Basic metabolic panel  - Hepatitis C Screen Reflex to HCV RNA Quant and Genotype  - TSH with free T4 reflex    2. Adjustment disorder, unspecified type  Patient reports increased stress.  Discussed options for management, recommend counseling.  Referral was placed today.  - MENTAL HEALTH REFERRAL  - Adult; Outpatient Treatment; Individual/Couples/Family/Group Therapy/Health Psychology; [facility] [place]: [facility] [phone]; We will contact you to schedule the appointment or please call with any questions    3. Routine general medical examination at a health care facility  Patient is up-to-date on age-appropriate cancer screening.      Patient has been advised of split billing requirements and indicates understanding: Yes  COUNSELING:   Reviewed preventive health counseling, as reflected in patient instructions       Regular exercise       Healthy diet/nutrition    Estimated body mass index is 35.71 kg/m  as calculated from the following:    Height as of this encounter: 1.702 m (5' 7\").    Weight as of this encounter: 103.4 kg (228 lb).        She " reports that she has never smoked. She quit smokeless tobacco use about 6 years ago.      Counseling Resources:  ATP IV Guidelines  Pooled Cohorts Equation Calculator  Breast Cancer Risk Calculator  BRCA-Related Cancer Risk Assessment: FHS-7 Tool  FRAX Risk Assessment  ICSI Preventive Guidelines  Dietary Guidelines for Americans, 2010  USDA's MyPlate  ASA Prophylaxis  Lung CA Screening    Nadira Colon MD  [facility] [place]

## 2020-12-14 NOTE — TELEPHONE ENCOUNTER
Called and LVM for patient to please return call to schedule 6 month follow up with Dr. Gu.    Valdemar Hays  Sleep Clinic Specialist - [place]

## 2020-12-16 LAB — HCV AB SERPL QL IA: NONREACTIVE

## 2020-12-17 DIAGNOSIS — I10 ESSENTIAL HYPERTENSION, BENIGN: ICD-10-CM

## 2020-12-17 DIAGNOSIS — E78.5 HYPERLIPIDEMIA LDL GOAL <100: ICD-10-CM

## 2020-12-18 RX ORDER — ENALAPRIL MALEATE 20 MG/1
TABLET ORAL
Qty: 90 TABLET | Refills: 3 | Status: SHIPPED | OUTPATIENT
Start: 2020-12-18 | End: 2022-03-07

## 2020-12-18 RX ORDER — ATORVASTATIN CALCIUM 20 MG/1
TABLET, FILM COATED ORAL
Qty: 90 TABLET | Refills: 3 | Status: SHIPPED | OUTPATIENT
Start: 2020-12-18 | End: 2022-09-22

## 2020-12-21 DIAGNOSIS — N39.41 URGE INCONTINENCE: ICD-10-CM

## 2020-12-21 DIAGNOSIS — N32.81 OVERACTIVE BLADDER: ICD-10-CM

## 2020-12-21 RX ORDER — MIRABEGRON 50 MG/1
50 TABLET, EXTENDED RELEASE ORAL DAILY
Qty: 90 TABLET | Refills: 1 | Status: SHIPPED | OUTPATIENT
Start: 2020-12-21 | End: 2021-06-08

## 2020-12-21 NOTE — TELEPHONE ENCOUNTER
Refill request received for myrbetriq. Patient saw Dr. Hawk on 11/11/2020, plan to continue this med. Refill sent per protocol.

## 2020-12-23 ENCOUNTER — TELEPHONE (OUTPATIENT)
Dept: OBGYN | Facility: CLINIC | Age: 50
End: 2020-12-23

## 2020-12-29 DIAGNOSIS — G25.81 RESTLESS LEGS SYNDROME (RLS): ICD-10-CM

## 2020-12-29 NOTE — TELEPHONE ENCOUNTER
Pending RX Pramipexole Dihydrochloride oral table 0.25 mg,  DISP:  90 tablets, SIG:  TAke 1 to 3 tablets by mouth 1.5 to 2 hours before bed.  Start with 1 TABLET, ok to add additional tablet after 3-4 days if residual symptoms. R^ 1     Last filled 12/4/2020   Last Written Prescription Date:  10/19/2020  Last Fill Quantity:90   # refills:1  Last Office Visit: 11/23/2020  Future Office visit:         Routed to Dr. Gu for review.  Routing refill request to provider for review/approval because:  Drug not on the FM, P or [facility] refill protocol or controlled substance

## 2020-12-31 RX ORDER — PRAMIPEXOLE DIHYDROCHLORIDE 0.25 MG/1
TABLET ORAL
Qty: 90 TABLET | Refills: 1 | Status: SHIPPED | OUTPATIENT
Start: 2020-12-31 | End: 2021-03-09

## 2021-01-26 ENCOUNTER — IMMUNIZATION (OUTPATIENT)
Dept: NURSING | Facility: CLINIC | Age: 51
End: 2021-01-26
Payer: COMMERCIAL

## 2021-01-26 PROCEDURE — 91300 PR COVID VAC PFIZER DIL RECON 30 MCG/0.3 ML IM: CPT

## 2021-01-26 PROCEDURE — 0001A PR COVID VAC PFIZER DIL RECON 30 MCG/0.3 ML IM: CPT

## 2021-01-27 ENCOUNTER — VIRTUAL VISIT (OUTPATIENT)
Dept: UROLOGY | Facility: CLINIC | Age: 51
End: 2021-01-27
Attending: OBSTETRICS & GYNECOLOGY
Payer: COMMERCIAL

## 2021-01-27 DIAGNOSIS — N32.81 OVERACTIVE BLADDER: Primary | ICD-10-CM

## 2021-01-27 PROCEDURE — 99214 OFFICE O/P EST MOD 30 MIN: CPT | Mod: 95 | Performed by: OBSTETRICS & GYNECOLOGY

## 2021-01-27 RX ORDER — CEFAZOLIN SODIUM 2 G/100ML
2 INJECTION, SOLUTION INTRAVENOUS
Status: CANCELLED | OUTPATIENT
Start: 2021-01-27

## 2021-01-27 RX ORDER — CEFAZOLIN SODIUM 1 G/3ML
1 INJECTION, POWDER, FOR SOLUTION INTRAMUSCULAR; INTRAVENOUS SEE ADMIN INSTRUCTIONS
Status: CANCELLED | OUTPATIENT
Start: 2021-01-27

## 2021-01-27 NOTE — PROGRESS NOTES
Adriana is a 50 year old who is being evaluated via a billable telephone visit.      What phone number would you like to be contacted at? [phone]   How would you like to obtain your AVS? Jody Triplett     Adriana is a 50 year old who presents to clinic today for the following health issues      HPI       Pt has attempted medical management of her OAB. She has attempted anti-cholinergics, myrbetriq and anti-cholinergics combined with myrbetriq without improvement in her symptoms. She noted an increase in her constipation and dry eyes with her most recent course of vesicare. She is here to day to discuss treatment options after failed medical management.    Review of Systems   ENT/MOUTH: NEGATIVE for ear, mouth and throat problems  RESP: NEGATIVE for significant cough or SOB  CV: NEGATIVE for chest pain, palpitations or peripheral edema      Objective    Vitals:  No vitals were obtained today due to virtual visit.    Physical Exam   healthy, alert and no distress  PSYCH: Alert and oriented times 3; coherent speech, normal   rate and volume, able to articulate logical thoughts, able   to abstract reason, no tangential thoughts, no hallucinations   or delusions  Her affect is normal  RESP: No cough, no audible wheezing, able to talk in full sentences  Remainder of exam unable to be completed due to telephone visits    A/P: We discussed the diagnosis and treatment options. Pt has urgency and urge incontinence. Treatment options to include:  1) observation with f/u in 6 -12 months  2) pelvic floor physical therapy and bladder training  3) anti-cholinergic medications or myrbetriq  4) BOTOX injections  5) Interstim    We reviewed the R/B/A of BOTOX versus SNM. Pt would prefer to attempt SNM. Will schedule.      Eyad Hawk MD        Phone call duration: 20 minutes

## 2021-01-28 ENCOUNTER — HOSPITAL ENCOUNTER (OUTPATIENT)
Facility: AMBULATORY SURGERY CENTER | Age: 51
End: 2021-01-28
Attending: OBSTETRICS & GYNECOLOGY
Payer: COMMERCIAL

## 2021-01-28 ENCOUNTER — TELEPHONE (OUTPATIENT)
Dept: OBGYN | Facility: CLINIC | Age: 51
End: 2021-01-28

## 2021-01-28 DIAGNOSIS — N32.81 OVERACTIVE BLADDER: ICD-10-CM

## 2021-01-28 NOTE — TELEPHONE ENCOUNTER
Patient will call in the month of February to schedule surgery, pt was given surgery scheduler number.

## 2021-02-02 ENCOUNTER — TELEPHONE (OUTPATIENT)
Dept: OBGYN | Facility: CLINIC | Age: 51
End: 2021-02-02

## 2021-02-12 ENCOUNTER — TELEPHONE (OUTPATIENT)
Dept: OBGYN | Facility: CLINIC | Age: 51
End: 2021-02-12

## 2021-02-12 NOTE — TELEPHONE ENCOUNTER
Confirmed updated surgery date with patient, 3/1 arrival time at 11:10a.m and 3/15, arrival time at 9:40a.m.

## 2021-02-12 NOTE — TELEPHONE ENCOUNTER
Confirmed surgery ([facility]) dates, 3/8/21 and 3/22/21, arrival time at 8:30a.m with nothing to eat eight hours before scheduled surgery time, clear liquids up to two hours before, h&p within 30 days, COVID testing 96 hours prior, surgery packed mailed out.,      to complete the following fields:            CHECKLIST     Google Calendar : Yes     Resident notified: Not Applicable     Clinic schedule blocked:  Not Applicable    Patient notified:Yes      Pre op information sent: Yes     Given to patient over the phone.Yes    Comments:

## 2021-02-13 DIAGNOSIS — Z11.59 ENCOUNTER FOR SCREENING FOR OTHER VIRAL DISEASES: Primary | ICD-10-CM

## 2021-02-16 ENCOUNTER — IMMUNIZATION (OUTPATIENT)
Dept: NURSING | Facility: CLINIC | Age: 51
End: 2021-02-16
Attending: FAMILY MEDICINE
Payer: COMMERCIAL

## 2021-02-16 PROCEDURE — 91300 PR COVID VAC PFIZER DIL RECON 30 MCG/0.3 ML IM: CPT

## 2021-02-16 PROCEDURE — 0002A PR COVID VAC PFIZER DIL RECON 30 MCG/0.3 ML IM: CPT

## 2021-02-25 DIAGNOSIS — Z11.59 ENCOUNTER FOR SCREENING FOR OTHER VIRAL DISEASES: ICD-10-CM

## 2021-02-25 LAB
LABORATORY COMMENT REPORT: NORMAL
SARS-COV-2 RNA RESP QL NAA+PROBE: NEGATIVE
SARS-COV-2 RNA RESP QL NAA+PROBE: NORMAL
SPECIMEN SOURCE: NORMAL
SPECIMEN SOURCE: NORMAL

## 2021-02-25 PROCEDURE — U0003 INFECTIOUS AGENT DETECTION BY NUCLEIC ACID (DNA OR RNA); SEVERE ACUTE RESPIRATORY SYNDROME CORONAVIRUS 2 (SARS-COV-2) (CORONAVIRUS DISEASE [COVID-19]), AMPLIFIED PROBE TECHNIQUE, MAKING USE OF HIGH THROUGHPUT TECHNOLOGIES AS DESCRIBED BY CMS-2020-01-R: HCPCS | Performed by: OBSTETRICS & GYNECOLOGY

## 2021-02-25 PROCEDURE — U0005 INFEC AGEN DETEC AMPLI PROBE: HCPCS | Performed by: OBSTETRICS & GYNECOLOGY

## 2021-02-26 ENCOUNTER — OFFICE VISIT (OUTPATIENT)
Dept: FAMILY MEDICINE | Facility: CLINIC | Age: 51
End: 2021-02-26
Attending: FAMILY MEDICINE
Payer: COMMERCIAL

## 2021-02-26 ENCOUNTER — ANESTHESIA EVENT (OUTPATIENT)
Dept: SURGERY | Facility: AMBULATORY SURGERY CENTER | Age: 51
End: 2021-02-26

## 2021-02-26 VITALS
WEIGHT: 230 LBS | BODY MASS INDEX: 36.1 KG/M2 | DIASTOLIC BLOOD PRESSURE: 82 MMHG | HEART RATE: 78 BPM | HEIGHT: 67 IN | SYSTOLIC BLOOD PRESSURE: 127 MMHG

## 2021-02-26 DIAGNOSIS — Z01.818 PREOP GENERAL PHYSICAL EXAM: Primary | ICD-10-CM

## 2021-02-26 DIAGNOSIS — Z01.818 PREOP GENERAL PHYSICAL EXAM: ICD-10-CM

## 2021-02-26 LAB
ANION GAP SERPL CALCULATED.3IONS-SCNC: 7 MMOL/L (ref 3–14)
BUN SERPL-MCNC: 18 MG/DL (ref 7–30)
CALCIUM SERPL-MCNC: 8.8 MG/DL (ref 8.5–10.1)
CHLORIDE SERPL-SCNC: 112 MMOL/L (ref 94–109)
CO2 SERPL-SCNC: 24 MMOL/L (ref 20–32)
CREAT SERPL-MCNC: 0.82 MG/DL (ref 0.52–1.04)
GFR SERPL CREATININE-BSD FRML MDRD: 83 ML/MIN/{1.73_M2}
GLUCOSE SERPL-MCNC: 107 MG/DL (ref 70–99)
HGB BLD-MCNC: 13.3 G/DL (ref 11.7–15.7)
POTASSIUM SERPL-SCNC: 4.2 MMOL/L (ref 3.4–5.3)
SODIUM SERPL-SCNC: 143 MMOL/L (ref 133–144)

## 2021-02-26 PROCEDURE — 85018 HEMOGLOBIN: CPT | Performed by: FAMILY MEDICINE

## 2021-02-26 PROCEDURE — 36415 COLL VENOUS BLD VENIPUNCTURE: CPT | Performed by: FAMILY MEDICINE

## 2021-02-26 PROCEDURE — 99203 OFFICE O/P NEW LOW 30 MIN: CPT | Performed by: FAMILY MEDICINE

## 2021-02-26 PROCEDURE — G0463 HOSPITAL OUTPT CLINIC VISIT: HCPCS

## 2021-02-26 PROCEDURE — 80048 BASIC METABOLIC PNL TOTAL CA: CPT | Performed by: FAMILY MEDICINE

## 2021-02-26 ASSESSMENT — MIFFLIN-ST. JEOR: SCORE: 1700.9

## 2021-02-26 NOTE — H&P (VIEW-ONLY)
[facility]OM[facility] PROFESSIONAL BLDG  3RD FLR,[address]  [address]  [facility] [address]  [facility] [address]  Phone: [phone]  Fax: [phone]  Primary Provider: Nadira Colon  Pre-op Performing Provider: RITA THOMAS      PREOPERATIVE EVALUATION:  Today's date: 2/26/2021    Adriana Lucia is a 50 year old female who presents for a preoperative evaluation.    Surgical Information:  Surgery/Procedure: Sacral nerve stimulator insertion  Surgery Location: Clinics and surgery center  Surgeon: Dr. Hawk  Surgery Date: 3/1/2021  Where patient plans to recover: At home with family  Fax number for surgical facility: Note does not need to be faxed, will be available electronically in Epic.    Type of Anesthesia Anticipated: to be determined    Assessment & Plan     The proposed surgical procedure is considered LOW risk.    Problem List Items Addressed This Visit     None              Risks and Recommendations:  The patient has the following additional risks and recommendations for perioperative complications:  Cardiovascular:      Medication Instructions:  Patient is to take all scheduled medications on the day of surgery    RECOMMENDATION:  APPROVAL GIVEN to proceed with proposed procedure    Review of the result(s) of each unique test - see below      Subjective     HPI related to upcoming procedure:  Patient with long history of overactive bladder, which has failed conservative management. Therapy with placement of sacral nerve stimulator recommended.     1. No - Have you ever had a heart attack or stroke?  2. No - Have you ever had surgery on your heart or blood vessels, such as a stent, coronary (heart) bypass, or surgery on an artery in the head, neck, heart, or legs?  3. No - Do you have chest pain when you are physically active?  4. No - Do you have a history of heart failure?  5. No - Do you currently have a cold, bronchitis, or symptoms of other respiratory (head  and chest) infections?  6. No - Do you have a cough, shortness of breath, or wheezing?  7. No - Do you or anyone in your family have a history of blood clots?  8. No - Do you or anyone in your family have a serious bleeding problem, such as long-lasting bleeding after surgeries or cuts?  9. No - Have you ever had anemia or been told to take iron pills?  10. No - Have you had any abnormal blood loss such as black, tarry or bloody stools, or abnormal vaginal bleeding?  11. No - Have you ever had a blood transfusion?  12. Yes - Are you willing to have a blood transfusion if it is medically needed before, during, or after your surgery?  13. No - Have you or anyone in your family ever had problems with anesthesia (sedation for surgery)?  14. No - Do you have sleep apnea, excessive snoring, or daytime drowsiness?   15. No - Do you have any artifical heart valves or other implanted medical devices, such as a pacemaker, defibrillator, or continuous glucose monitor?  16. No - Do you have any artifical joints?  17. No - Are you allergic to latex?  18. No - Is there any chance that you may be pregnant?  No flowsheet data found.    Health Care Directive:  Patient does not have a Health Care Directive or Living Will: Discussed advance care planning with patient; however, patient declined at this time.    Preoperative Review of :   reviewed - no record of controlled substances prescribed.      Status of Chronic Conditions:  See problem list for active medical problems.  Problems all longstanding and stable, except as noted/documented.  See ROS for pertinent symptoms related to these conditions.    HYPERLIPIDEMIA - Patient has a long history of significant Hyperlipidemia requiring medication for treatment with recent good control. Patient reports no problems or side effects with the medication.     HYPERTENSION - Patient has longstanding history of HTN , currently denies any symptoms referable to elevated blood pressure.  Specifically denies chest pain, palpitations, dyspnea, orthopnea, PND or peripheral edema. Blood pressure readings have been in normal range. Current medication regimen is as listed below. Patient denies any side effects of medication.       Review of Systems  CONSTITUTIONAL: NEGATIVE for fever, chills, change in weight  EYES: NEGATIVE for vision changes or irritation  ENT/MOUTH: NEGATIVE for ear, mouth and throat problems  RESP: NEGATIVE for significant cough or SOB  CV: NEGATIVE for chest pain, palpitations or peripheral edema  GI: NEGATIVE for nausea, abdominal pain, heartburn, or change in bowel habits  : NEGATIVE for frequency, dysuria, or hematuria  MUSCULOSKELETAL: NEGATIVE for significant arthralgias or myalgia  NEURO: NEGATIVE for weakness, dizziness or paresthesias  ENDOCRINE: NEGATIVE for temperature intolerance, skin/hair changes  HEME: NEGATIVE for bleeding problems  PSYCHIATRIC: NEGATIVE for changes in mood or affect    Patient Active Problem List    Diagnosis Date Noted     Overactive bladder 2021     Priority: Medium     Added automatically from request for surgery [id number]       Restless legs syndrome (RLS) 2020     Priority: Medium     Morbid obesity (H) 2020     Priority: Medium     Somatic dysfunction of pelvic region 10/17/2018     Priority: Medium     Cherry angioma 2015     Priority: Medium     Skin cancer screening 2015     Priority: Medium     Multiple benign nevi 2015     Priority: Medium     Dermatofibroma 2015     Priority: Medium     Skin tag 2015     Priority: Medium     Chronic hypertension with superimposed preeclampsia 2015     Priority: Medium     S/P  section 2015     Priority: Medium     Chronic hypertension 2015     Priority: Medium     LGA >97th %ile at 36+6 (efw 3871 g) 2015     Priority: Medium     Class: Acute     High-risk pregnancy, AMA 2014     Priority: Medium     Class: Acute      14: Normal NT  Growth US q 3 wks beginning at 28 weeks  Will need weekly BPPs starting at 32 weeks  *please let Nandini Page know when IOL starts/[phone]*       HSV (herpes simplex virus) infection 2014     Priority: Medium     Genital; outbreaks once annually; discussed prophylaxsis starting at 36 weeks.       Cervical polyp 2014     Priority: Medium     Georgian removed polyp, no bleeding since       Elevated blood pressure reading without diagnosis of hypertension 2014     Priority: Medium     4/3/15: /84 today.  2.18.15: labetalol daily, stopped aspirin   Agrees to monitor daily; will schedule  appt with Dr Colon  2015:Baseline labs drawn-all nml        Vitamin D deficiency 2014     Priority: Medium     Taking 5,000 iU daily  Problem list name updated by automated process. Provider to review       Hyperlipidemia 2014     Priority: Medium     FH and previous history of  Problem list name updated by automated process. Provider to review        Past Medical History:   Diagnosis Date     HSV (herpes simplex virus) infection      Hyperlipidemia      Hypertension      Past Surgical History:   Procedure Laterality Date      SECTION N/A 2015    Procedure:  SECTION;  Surgeon: Meredith Clark MD;  Location: [facility] L+D     wisdom teeth           Current Outpatient Medications   Medication Sig Dispense Refill     atorvastatin (LIPITOR) 20 MG tablet TAKE ONE TABLET BY MOUTH ONE TIME DAILY  90 tablet 3     enalapril (VASOTEC) 20 MG tablet TAKE 1 TABLET BY MOUTH ONE TIME DAILY  90 tablet 3     mirabegron (MYRBETRIQ) 50 MG 24 hr tablet Take 1 tablet (50 mg) by mouth daily 90 tablet 1     pramipexole (MIRAPEX) 0.25 MG tablet Take 1-3 tablets by mouth at 1.5-2 hours before bed.  Start with 1 tablet, ok to add additional tablet after 3-4 days if residual symptoms. 90 tablet 1     solifenacin (VESICARE) 5 MG tablet Take 1 tablet (5 mg) by  "mouth daily 90 tablet 3     VITAMIN D, CHOLECALCIFEROL, PO Take 5,000 Units by mouth every other day       SUMAtriptan (IMITREX) 50 MG tablet take 1 tablet by mouth at onset of headache for migraine, may repeat dose in 2 hours, max 200mg in 24 hours (Patient not taking: Reported on 11/11/2020) 12 tablet 0       No Known Allergies     Social History     Tobacco Use     Smoking status: Never Smoker     Smokeless tobacco: Former User   Substance Use Topics     Alcohol use: Yes     Alcohol/week: 2.0 standard drinks     Family History   Problem Relation Age of Onset     Asthma Father      Allergies Father      Lipids Father      Cerebrovascular Disease Father      Hypertension Father      Hyperlipidemia Father      Diabetes Father      Obesity Father      Alcohol/Drug Paternal Grandfather      Substance Abuse Paternal Grandfather      Allergies Mother      Lipids Mother      Thyroid Disease Mother      Other - See Comments Mother         DVT during preg      Hypertension Mother      Hyperlipidemia Mother      Anxiety Disorder Mother      Depression Mother      Obesity Mother      Lipids Maternal Grandmother      Hyperlipidemia Maternal Grandmother      Obesity Maternal Grandmother      Thyroid Disease Maternal Grandfather      Obesity Maternal Grandfather      Cancer No family hx of         no skin cancer     History   Drug Use No         Objective     /82 (BP Location: Left arm, Patient Position: Chair)   Pulse 78   Ht 1.702 m (5' 7\")   Wt 104.3 kg (230 lb)   BMI 36.02 kg/m      Physical Exam    GENERAL APPEARANCE: healthy, alert and no distress     EYES: EOMI, PERRL     HENT: ear canals and TM's normal and nose and mouth without ulcers or lesions     NECK: no adenopathy, no asymmetry, masses, or scars and thyroid normal to palpation     RESP: lungs clear to auscultation - no rales, rhonchi or wheezes     CV: regular rates and rhythm, normal S1 S2, no S3 or S4 and no murmur, click or rub     ABDOMEN:  soft, " nontender, no HSM or masses and bowel sounds normal     MS: extremities normal- no gross deformities noted, no evidence of inflammation in joints, FROM in all extremities.     SKIN: no suspicious lesions or rashes     NEURO: Normal strength and tone, sensory exam grossly normal, mentation intact and speech normal     PSYCH: mentation appears normal. and affect normal/bright     LYMPHATICS: No cervical adenopathy    Recent Labs   Lab Test 12/14/20  1451 11/11/19  1041 11/04/19  1047   HGB  --  12.1  --    PLT  --  218  --      --  142   POTASSIUM 3.9  --  3.8   CR 0.70  --  0.62        Diagnostics:  Recent Results (from the past 24 hour(s))   Hemoglobin    Collection Time: 02/26/21  9:12 AM   Result Value Ref Range    Hemoglobin 13.3 11.7 - 15.7 g/dL   Basic Metabolic Panel    Collection Time: 02/26/21  9:12 AM   Result Value Ref Range    Sodium 143 133 - 144 mmol/L    Potassium 4.2 3.4 - 5.3 mmol/L    Chloride 112 (H) 94 - 109 mmol/L    Carbon Dioxide 24 20 - 32 mmol/L    Anion Gap 7 3 - 14 mmol/L    Glucose 107 (H) 70 - 99 mg/dL    Urea Nitrogen 18 7 - 30 mg/dL    Creatinine 0.82 0.52 - 1.04 mg/dL    GFR Estimate 83 >60 mL/min/[1.73_m2]    GFR Estimate If Black >90 >60 mL/min/[1.73_m2]    Calcium 8.8 8.5 - 10.1 mg/dL      No EKG required for low risk surgery (cataract, skin procedure, breast biopsy, etc).    Revised Cardiac Risk Index (RCRI):  The patient has the following serious cardiovascular risks for perioperative complications:   - No serious cardiac risks = 0 points     RCRI Interpretation: 0 points: Class I (very low risk - 0.4% complication rate)             Signed Electronically by: Carl Fischer MD  Copy of this evaluation report is provided to requesting physician.    [facility] Guidelines    Revised Cardiac Risk Index

## 2021-02-26 NOTE — PROGRESS NOTES
[facility]OMEN[facility] PROFESSIONAL BLDG  3RD FLR,RICARDO [address]  [address]  [facility] [address]  [facility] [address]  Phone: [phone]  Fax: [phone]  Primary Provider: Nadira Colon  Pre-op Performing Provider: RITA THOMAS      PREOPERATIVE EVALUATION:  Today's date: 2/26/2021    Adriana Lucia is a 50 year old female who presents for a preoperative evaluation.    Surgical Information:  Surgery/Procedure: Sacral nerve stimulator insertion  Surgery Location: Clinics and surgery center  Surgeon: Dr. Hawk  Surgery Date: 3/1/2021  Where patient plans to recover: At home with family  Fax number for surgical facility: Note does not need to be faxed, will be available electronically in Epic.    Type of Anesthesia Anticipated: to be determined    Assessment & Plan     The proposed surgical procedure is considered LOW risk.    Problem List Items Addressed This Visit     None              Risks and Recommendations:  The patient has the following additional risks and recommendations for perioperative complications:  Cardiovascular:      Medication Instructions:  Patient is to take all scheduled medications on the day of surgery    RECOMMENDATION:  APPROVAL GIVEN to proceed with proposed procedure    Review of the result(s) of each unique test - see below      Subjective     HPI related to upcoming procedure:  Patient with long history of overactive bladder, which has failed conservative management. Therapy with placement of sacral nerve stimulator recommended.     1. No - Have you ever had a heart attack or stroke?  2. No - Have you ever had surgery on your heart or blood vessels, such as a stent, coronary (heart) bypass, or surgery on an artery in the head, neck, heart, or legs?  3. No - Do you have chest pain when you are physically active?  4. No - Do you have a history of heart failure?  5. No - Do you currently have a cold, bronchitis, or symptoms of other respiratory (head  and chest) infections?  6. No - Do you have a cough, shortness of breath, or wheezing?  7. No - Do you or anyone in your family have a history of blood clots?  8. No - Do you or anyone in your family have a serious bleeding problem, such as long-lasting bleeding after surgeries or cuts?  9. No - Have you ever had anemia or been told to take iron pills?  10. No - Have you had any abnormal blood loss such as black, tarry or bloody stools, or abnormal vaginal bleeding?  11. No - Have you ever had a blood transfusion?  12. Yes - Are you willing to have a blood transfusion if it is medically needed before, during, or after your surgery?  13. No - Have you or anyone in your family ever had problems with anesthesia (sedation for surgery)?  14. No - Do you have sleep apnea, excessive snoring, or daytime drowsiness?   15. No - Do you have any artifical heart valves or other implanted medical devices, such as a pacemaker, defibrillator, or continuous glucose monitor?  16. No - Do you have any artifical joints?  17. No - Are you allergic to latex?  18. No - Is there any chance that you may be pregnant?  No flowsheet data found.    Health Care Directive:  Patient does not have a Health Care Directive or Living Will: Discussed advance care planning with patient; however, patient declined at this time.    Preoperative Review of :   reviewed - no record of controlled substances prescribed.      Status of Chronic Conditions:  See problem list for active medical problems.  Problems all longstanding and stable, except as noted/documented.  See ROS for pertinent symptoms related to these conditions.    HYPERLIPIDEMIA - Patient has a long history of significant Hyperlipidemia requiring medication for treatment with recent good control. Patient reports no problems or side effects with the medication.     HYPERTENSION - Patient has longstanding history of HTN , currently denies any symptoms referable to elevated blood pressure.  Specifically denies chest pain, palpitations, dyspnea, orthopnea, PND or peripheral edema. Blood pressure readings have been in normal range. Current medication regimen is as listed below. Patient denies any side effects of medication.       Review of Systems  CONSTITUTIONAL: NEGATIVE for fever, chills, change in weight  EYES: NEGATIVE for vision changes or irritation  ENT/MOUTH: NEGATIVE for ear, mouth and throat problems  RESP: NEGATIVE for significant cough or SOB  CV: NEGATIVE for chest pain, palpitations or peripheral edema  GI: NEGATIVE for nausea, abdominal pain, heartburn, or change in bowel habits  : NEGATIVE for frequency, dysuria, or hematuria  MUSCULOSKELETAL: NEGATIVE for significant arthralgias or myalgia  NEURO: NEGATIVE for weakness, dizziness or paresthesias  ENDOCRINE: NEGATIVE for temperature intolerance, skin/hair changes  HEME: NEGATIVE for bleeding problems  PSYCHIATRIC: NEGATIVE for changes in mood or affect    Patient Active Problem List    Diagnosis Date Noted     Overactive bladder 2021     Priority: Medium     Added automatically from request for surgery [id number]       Restless legs syndrome (RLS) 2020     Priority: Medium     Morbid obesity (H) 2020     Priority: Medium     Somatic dysfunction of pelvic region 10/17/2018     Priority: Medium     Cherry angioma 2015     Priority: Medium     Skin cancer screening 2015     Priority: Medium     Multiple benign nevi 2015     Priority: Medium     Dermatofibroma 2015     Priority: Medium     Skin tag 2015     Priority: Medium     Chronic hypertension with superimposed preeclampsia 2015     Priority: Medium     S/P  section 2015     Priority: Medium     Chronic hypertension 2015     Priority: Medium     LGA >97th %ile at 36+6 (efw 3871 g) 2015     Priority: Medium     Class: Acute     High-risk pregnancy, AMA 2014     Priority: Medium     Class: Acute      14: Normal NT  Growth US q 3 wks beginning at 28 weeks  Will need weekly BPPs starting at 32 weeks  *please let Nandini Page know when IOL starts/labor.[phone]*       HSV (herpes simplex virus) infection 2014     Priority: Medium     Genital; outbreaks once annually; discussed prophylaxsis starting at 36 weeks.       Cervical polyp 2014     Priority: Medium     [place] removed polyp, no bleeding since       Elevated blood pressure reading without diagnosis of hypertension 2014     Priority: Medium     4/3/15: /84 today.  2.18.15: labetalol daily, stopped aspirin   Agrees to monitor daily; will schedule  appt with Dr Colon  2015:Baseline labs drawn-all nml        Vitamin D deficiency 2014     Priority: Medium     Taking 5,000 iU daily  Problem list name updated by automated process. Provider to review       Hyperlipidemia 2014     Priority: Medium     FH and previous history of  Problem list name updated by automated process. Provider to review        Past Medical History:   Diagnosis Date     HSV (herpes simplex virus) infection      Hyperlipidemia      Hypertension      Past Surgical History:   Procedure Laterality Date      SECTION N/A 2015    Procedure:  SECTION;  Surgeon: Meredith Clark MD;  Location: [facility] L+D     wisdom teeth           Current Outpatient Medications   Medication Sig Dispense Refill     atorvastatin (LIPITOR) 20 MG tablet TAKE ONE TABLET BY MOUTH ONE TIME DAILY  90 tablet 3     enalapril (VASOTEC) 20 MG tablet TAKE 1 TABLET BY MOUTH ONE TIME DAILY  90 tablet 3     mirabegron (MYRBETRIQ) 50 MG 24 hr tablet Take 1 tablet (50 mg) by mouth daily 90 tablet 1     pramipexole (MIRAPEX) 0.25 MG tablet Take 1-3 tablets by mouth at 1.5-2 hours before bed.  Start with 1 tablet, ok to add additional tablet after 3-4 days if residual symptoms. 90 tablet 1     solifenacin (VESICARE) 5 MG tablet Take 1 tablet (5 mg) by  "mouth daily 90 tablet 3     VITAMIN D, CHOLECALCIFEROL, PO Take 5,000 Units by mouth every other day       SUMAtriptan (IMITREX) 50 MG tablet take 1 tablet by mouth at onset of headache for migraine, may repeat dose in 2 hours, max 200mg in 24 hours (Patient not taking: Reported on 11/11/2020) 12 tablet 0       No Known Allergies     Social History     Tobacco Use     Smoking status: Never Smoker     Smokeless tobacco: Former User   Substance Use Topics     Alcohol use: Yes     Alcohol/week: 2.0 standard drinks     Family History   Problem Relation Age of Onset     Asthma Father      Allergies Father      Lipids Father      Cerebrovascular Disease Father      Hypertension Father      Hyperlipidemia Father      Diabetes Father      Obesity Father      Alcohol/Drug Paternal Grandfather      Substance Abuse Paternal Grandfather      Allergies Mother      Lipids Mother      Thyroid Disease Mother      Other - See Comments Mother         DVT during preg      Hypertension Mother      Hyperlipidemia Mother      Anxiety Disorder Mother      Depression Mother      Obesity Mother      Lipids Maternal Grandmother      Hyperlipidemia Maternal Grandmother      Obesity Maternal Grandmother      Thyroid Disease Maternal Grandfather      Obesity Maternal Grandfather      Cancer No family hx of         no skin cancer     History   Drug Use No         Objective     /82 (BP Location: Left arm, Patient Position: Chair)   Pulse 78   Ht 1.702 m (5' 7\")   Wt 104.3 kg (230 lb)   BMI 36.02 kg/m      Physical Exam    GENERAL APPEARANCE: healthy, alert and no distress     EYES: EOMI, PERRL     HENT: ear canals and TM's normal and nose and mouth without ulcers or lesions     NECK: no adenopathy, no asymmetry, masses, or scars and thyroid normal to palpation     RESP: lungs clear to auscultation - no rales, rhonchi or wheezes     CV: regular rates and rhythm, normal S1 S2, no S3 or S4 and no murmur, click or rub     ABDOMEN:  soft, " nontender, no HSM or masses and bowel sounds normal     MS: extremities normal- no gross deformities noted, no evidence of inflammation in joints, FROM in all extremities.     SKIN: no suspicious lesions or rashes     NEURO: Normal strength and tone, sensory exam grossly normal, mentation intact and speech normal     PSYCH: mentation appears normal. and affect normal/bright     LYMPHATICS: No cervical adenopathy    Recent Labs   Lab Test 12/14/20  1451 11/11/19  1041 11/04/19  1047   HGB  --  12.1  --    PLT  --  218  --      --  142   POTASSIUM 3.9  --  3.8   CR 0.70  --  0.62        Diagnostics:  Recent Results (from the past 24 hour(s))   Hemoglobin    Collection Time: 02/26/21  9:12 AM   Result Value Ref Range    Hemoglobin 13.3 11.7 - 15.7 g/dL   Basic Metabolic Panel    Collection Time: 02/26/21  9:12 AM   Result Value Ref Range    Sodium 143 133 - 144 mmol/L    Potassium 4.2 3.4 - 5.3 mmol/L    Chloride 112 (H) 94 - 109 mmol/L    Carbon Dioxide 24 20 - 32 mmol/L    Anion Gap 7 3 - 14 mmol/L    Glucose 107 (H) 70 - 99 mg/dL    Urea Nitrogen 18 7 - 30 mg/dL    Creatinine 0.82 0.52 - 1.04 mg/dL    GFR Estimate 83 >60 mL/min/[1.73_m2]    GFR Estimate If Black >90 >60 mL/min/[1.73_m2]    Calcium 8.8 8.5 - 10.1 mg/dL      No EKG required for low risk surgery (cataract, skin procedure, breast biopsy, etc).    Revised Cardiac Risk Index (RCRI):  The patient has the following serious cardiovascular risks for perioperative complications:   - No serious cardiac risks = 0 points     RCRI Interpretation: 0 points: Class I (very low risk - 0.4% complication rate)             Signed Electronically by: Carl Fischer MD  Copy of this evaluation report is provided to requesting physician.    [facility] Guidelines    Revised Cardiac Risk Index

## 2021-02-26 NOTE — LETTER
2/26/2021       RE: Adriana Lucia  [address]  [facility] [address]     Dear Colleague,    Thank you for referring your patient, Adriana Lucia, to the [facility]. Please see a copy of my visit note below.    [facility] PROFESSIONAL BLDG  3RD FLR,RICARD[address]  [address]  [facility] [address]  [facility] [address]  Phone: [phone]  Fax: [phone]  Primary Provider: Nadira Colon  Pre-op Performing Provider: RITA THOMAS      PREOPERATIVE EVALUATION:  Today's date: 2/26/2021    Adriana Lucia is a 50 year old female who presents for a preoperative evaluation.    Surgical Information:  Surgery/Procedure: Sacral nerve stimulator insertion  Surgery Location: [facility] and surgery center  Surgeon: Dr. Hawk  Surgery Date: 3/1/2021  Where patient plans to recover: At home with family  Fax number for surgical facility: Note does not need to be faxed, will be available electronically in Epic.    Type of Anesthesia Anticipated: to be determined    Assessment & Plan     The proposed surgical procedure is considered LOW risk.    Problem List Items Addressed This Visit     None              Risks and Recommendations:  The patient has the following additional risks and recommendations for perioperative complications:  Cardiovascular:      Medication Instructions:  Patient is to take all scheduled medications on the day of surgery    RECOMMENDATION:  APPROVAL GIVEN to proceed with proposed procedure    Review of the result(s) of each unique test - see below      Subjective     HPI related to upcoming procedure:  Patient with long history of overactive bladder, which has failed conservative management. Therapy with placement of sacral nerve stimulator recommended.     1. No - Have you ever had a heart attack or stroke?  2. No - Have you ever had surgery on your  heart or blood vessels, such as a stent, coronary (heart) bypass, or surgery on an artery in the head, neck, heart, or legs?  3. No - Do you have chest pain when you are physically active?  4. No - Do you have a history of heart failure?  5. No - Do you currently have a cold, bronchitis, or symptoms of other respiratory (head and chest) infections?  6. No - Do you have a cough, shortness of breath, or wheezing?  7. No - Do you or anyone in your family have a history of blood clots?  8. No - Do you or anyone in your family have a serious bleeding problem, such as long-lasting bleeding after surgeries or cuts?  9. No - Have you ever had anemia or been told to take iron pills?  10. No - Have you had any abnormal blood loss such as black, tarry or bloody stools, or abnormal vaginal bleeding?  11. No - Have you ever had a blood transfusion?  12. Yes - Are you willing to have a blood transfusion if it is medically needed before, during, or after your surgery?  13. No - Have you or anyone in your family ever had problems with anesthesia (sedation for surgery)?  14. No - Do you have sleep apnea, excessive snoring, or daytime drowsiness?   15. No - Do you have any artifical heart valves or other implanted medical devices, such as a pacemaker, defibrillator, or continuous glucose monitor?  16. No - Do you have any artifical joints?  17. No - Are you allergic to latex?  18. No - Is there any chance that you may be pregnant?  No flowsheet data found.    Health Care Directive:  Patient does not have a Health Care Directive or Living Will: Discussed advance care planning with patient; however, patient declined at this time.    Preoperative Review of :   reviewed - no record of controlled substances prescribed.      Status of Chronic Conditions:  See problem list for active medical problems.  Problems all longstanding and stable, except as noted/documented.  See ROS for pertinent symptoms related to these  conditions.    HYPERLIPIDEMIA - Patient has a long history of significant Hyperlipidemia requiring medication for treatment with recent good control. Patient reports no problems or side effects with the medication.     HYPERTENSION - Patient has longstanding history of HTN , currently denies any symptoms referable to elevated blood pressure. Specifically denies chest pain, palpitations, dyspnea, orthopnea, PND or peripheral edema. Blood pressure readings have been in normal range. Current medication regimen is as listed below. Patient denies any side effects of medication.       Review of Systems  CONSTITUTIONAL: NEGATIVE for fever, chills, change in weight  EYES: NEGATIVE for vision changes or irritation  ENT/MOUTH: NEGATIVE for ear, mouth and throat problems  RESP: NEGATIVE for significant cough or SOB  CV: NEGATIVE for chest pain, palpitations or peripheral edema  GI: NEGATIVE for nausea, abdominal pain, heartburn, or change in bowel habits  : NEGATIVE for frequency, dysuria, or hematuria  MUSCULOSKELETAL: NEGATIVE for significant arthralgias or myalgia  NEURO: NEGATIVE for weakness, dizziness or paresthesias  ENDOCRINE: NEGATIVE for temperature intolerance, skin/hair changes  HEME: NEGATIVE for bleeding problems  PSYCHIATRIC: NEGATIVE for changes in mood or affect    Patient Active Problem List    Diagnosis Date Noted     Overactive bladder 01/28/2021     Priority: Medium     Added automatically from request for surgery [id number]       Restless legs syndrome (RLS) 11/23/2020     Priority: Medium     Morbid obesity (H) 11/11/2020     Priority: Medium     Somatic dysfunction of pelvic region 10/17/2018     Priority: Medium     Cherry angioma 08/21/2015     Priority: Medium     Skin cancer screening 08/21/2015     Priority: Medium     Multiple benign nevi 08/21/2015     Priority: Medium     Dermatofibroma 08/21/2015     Priority: Medium     Skin tag 08/21/2015     Priority: Medium     Chronic hypertension with  superimposed preeclampsia 2015     Priority: Medium     S/P  section 2015     Priority: Medium     Chronic hypertension 2015     Priority: Medium     LGA >97th %ile at 36+6 (efw 3871 g) 2015     Priority: Medium     Class: Acute     High-risk pregnancy, AMA 2014     Priority: Medium     Class: Acute     14: Normal NT  Growth US q 3 wks beginning at 28 weeks  Will need weekly BPPs starting at 32 weeks  *please let Nandini Page know when IOL starts/labor.[phone]*       HSV (herpes simplex virus) infection 2014     Priority: Medium     Genital; outbreaks once annually; discussed prophylaxsis starting at 36 weeks.       Cervical polyp 2014     Priority: Medium     [place]gr[place] removed polyp, no bleeding since       Elevated blood pressure reading without diagnosis of hypertension 2014     Priority: Medium     4/3/15: /84 today.  2.18.15: labetalol daily, stopped aspirin   Agrees to monitor daily; will schedule  appt with Dr Colon  2015:Baseline labs drawn-all nml        Vitamin D deficiency 2014     Priority: Medium     Taking 5,000 iU daily  Problem list name updated by automated process. Provider to review       Hyperlipidemia 2014     Priority: Medium     FH and previous history of  Problem list name updated by automated process. Provider to review        Past Medical History:   Diagnosis Date     HSV (herpes simplex virus) infection      Hyperlipidemia      Hypertension      Past Surgical History:   Procedure Laterality Date      SECTION N/A 2015    Procedure:  SECTION;  Surgeon: Meredith Clark MD;  Location:  L+D     wisdom teeth           Current Outpatient Medications   Medication Sig Dispense Refill     atorvastatin (LIPITOR) 20 MG tablet TAKE ONE TABLET BY MOUTH ONE TIME DAILY  90 tablet 3     enalapril (VASOTEC) 20 MG tablet TAKE 1 TABLET BY MOUTH ONE TIME DAILY  90 tablet 3      "mirabegron (MYRBETRIQ) 50 MG 24 hr tablet Take 1 tablet (50 mg) by mouth daily 90 tablet 1     pramipexole (MIRAPEX) 0.25 MG tablet Take 1-3 tablets by mouth at 1.5-2 hours before bed.  Start with 1 tablet, ok to add additional tablet after 3-4 days if residual symptoms. 90 tablet 1     solifenacin (VESICARE) 5 MG tablet Take 1 tablet (5 mg) by mouth daily 90 tablet 3     VITAMIN D, CHOLECALCIFEROL, PO Take 5,000 Units by mouth every other day       SUMAtriptan (IMITREX) 50 MG tablet take 1 tablet by mouth at onset of headache for migraine, may repeat dose in 2 hours, max 200mg in 24 hours (Patient not taking: Reported on 11/11/2020) 12 tablet 0       No Known Allergies     Social History     Tobacco Use     Smoking status: Never Smoker     Smokeless tobacco: Former User   Substance Use Topics     Alcohol use: Yes     Alcohol/week: 2.0 standard drinks     Family History   Problem Relation Age of Onset     Asthma Father      Allergies Father      Lipids Father      Cerebrovascular Disease Father      Hypertension Father      Hyperlipidemia Father      Diabetes Father      Obesity Father      Alcohol/Drug Paternal Grandfather      Substance Abuse Paternal Grandfather      Allergies Mother      Lipids Mother      Thyroid Disease Mother      Other - See Comments Mother         DVT during preg      Hypertension Mother      Hyperlipidemia Mother      Anxiety Disorder Mother      Depression Mother      Obesity Mother      Lipids Maternal Grandmother      Hyperlipidemia Maternal Grandmother      Obesity Maternal Grandmother      Thyroid Disease Maternal Grandfather      Obesity Maternal Grandfather      Cancer No family hx of         no skin cancer     History   Drug Use No         Objective     /82 (BP Location: Left arm, Patient Position: Chair)   Pulse 78   Ht 1.702 m (5' 7\")   Wt 104.3 kg (230 lb)   BMI 36.02 kg/m      Physical Exam    GENERAL APPEARANCE: healthy, alert and no distress     EYES: EOMI, PERRL    "  HENT: ear canals and TM's normal and nose and mouth without ulcers or lesions     NECK: no adenopathy, no asymmetry, masses, or scars and thyroid normal to palpation     RESP: lungs clear to auscultation - no rales, rhonchi or wheezes     CV: regular rates and rhythm, normal S1 S2, no S3 or S4 and no murmur, click or rub     ABDOMEN:  soft, nontender, no HSM or masses and bowel sounds normal     MS: extremities normal- no gross deformities noted, no evidence of inflammation in joints, FROM in all extremities.     SKIN: no suspicious lesions or rashes     NEURO: Normal strength and tone, sensory exam grossly normal, mentation intact and speech normal     PSYCH: mentation appears normal. and affect normal/bright     LYMPHATICS: No cervical adenopathy    Recent Labs   Lab Test 12/14/20  1451 11/11/19  1041 11/04/19  1047   HGB  --  12.1  --    PLT  --  218  --      --  142   POTASSIUM 3.9  --  3.8   CR 0.70  --  0.62        Diagnostics:  Recent Results (from the past 24 hour(s))   Hemoglobin    Collection Time: 02/26/21  9:12 AM   Result Value Ref Range    Hemoglobin 13.3 11.7 - 15.7 g/dL   Basic Metabolic Panel    Collection Time: 02/26/21  9:12 AM   Result Value Ref Range    Sodium 143 133 - 144 mmol/L    Potassium 4.2 3.4 - 5.3 mmol/L    Chloride 112 (H) 94 - 109 mmol/L    Carbon Dioxide 24 20 - 32 mmol/L    Anion Gap 7 3 - 14 mmol/L    Glucose 107 (H) 70 - 99 mg/dL    Urea Nitrogen 18 7 - 30 mg/dL    Creatinine 0.82 0.52 - 1.04 mg/dL    GFR Estimate 83 >60 mL/min/[1.73_m2]    GFR Estimate If Black >90 >60 mL/min/[1.73_m2]    Calcium 8.8 8.5 - 10.1 mg/dL      No EKG required for low risk surgery (cataract, skin procedure, breast biopsy, etc).    Revised Cardiac Risk Index (RCRI):  The patient has the following serious cardiovascular risks for perioperative complications:   - No serious cardiac risks = 0 points     RCRI Interpretation: 0 points: Class I (very low risk - 0.4% complication rate)              Signed Electronically by: Carl Fischer MD  Copy of this evaluation report is provided to requesting physician.    Preop [facility] Preop Guidelines    Revised Cardiac Risk Index

## 2021-03-01 ENCOUNTER — HOSPITAL ENCOUNTER (OUTPATIENT)
Facility: AMBULATORY SURGERY CENTER | Age: 51
Discharge: HOME | End: 2021-03-01
Attending: OBSTETRICS & GYNECOLOGY | Admitting: OBSTETRICS & GYNECOLOGY
Payer: COMMERCIAL

## 2021-03-01 ENCOUNTER — ANCILLARY PROCEDURE (OUTPATIENT)
Dept: RADIOLOGY | Facility: AMBULATORY SURGERY CENTER | Age: 51
End: 2021-03-01
Attending: OBSTETRICS & GYNECOLOGY
Payer: COMMERCIAL

## 2021-03-01 ENCOUNTER — ANESTHESIA (OUTPATIENT)
Dept: SURGERY | Facility: AMBULATORY SURGERY CENTER | Age: 51
End: 2021-03-01

## 2021-03-01 VITALS
BODY MASS INDEX: 34.86 KG/M2 | HEIGHT: 68 IN | SYSTOLIC BLOOD PRESSURE: 124 MMHG | DIASTOLIC BLOOD PRESSURE: 81 MMHG | HEART RATE: 63 BPM | WEIGHT: 230 LBS | RESPIRATION RATE: 16 BRPM | OXYGEN SATURATION: 100 % | TEMPERATURE: 97 F

## 2021-03-01 DIAGNOSIS — N32.81 OVERACTIVE BLADDER: ICD-10-CM

## 2021-03-01 DIAGNOSIS — R52 PAIN: ICD-10-CM

## 2021-03-01 LAB
HCG UR QL: NEGATIVE
INTERNAL QC OK POCT: YES

## 2021-03-01 PROCEDURE — 81025 URINE PREGNANCY TEST: CPT | Performed by: PATHOLOGY

## 2021-03-01 PROCEDURE — 76000 FLUOROSCOPY <1 HR PHYS/QHP: CPT | Performed by: OBSTETRICS & GYNECOLOGY

## 2021-03-01 PROCEDURE — 64581 OPN IMPLTJ NEA SACRAL NERVE: CPT | Performed by: OBSTETRICS & GYNECOLOGY

## 2021-03-01 DEVICE — IMPLANTABLE DEVICE: Type: IMPLANTABLE DEVICE | Site: BACK | Status: FUNCTIONAL

## 2021-03-01 RX ORDER — NALOXONE HYDROCHLORIDE 0.4 MG/ML
0.2 INJECTION, SOLUTION INTRAMUSCULAR; INTRAVENOUS; SUBCUTANEOUS
Status: DISCONTINUED | OUTPATIENT
Start: 2021-03-01 | End: 2021-03-02 | Stop reason: HOSPADM

## 2021-03-01 RX ORDER — ALBUTEROL SULFATE 0.83 MG/ML
2.5 SOLUTION RESPIRATORY (INHALATION) EVERY 4 HOURS PRN
Status: DISCONTINUED | OUTPATIENT
Start: 2021-03-01 | End: 2021-03-01 | Stop reason: HOSPADM

## 2021-03-01 RX ORDER — KETOROLAC TROMETHAMINE 30 MG/ML
30 INJECTION, SOLUTION INTRAMUSCULAR; INTRAVENOUS EVERY 6 HOURS PRN
Status: DISCONTINUED | OUTPATIENT
Start: 2021-03-01 | End: 2021-03-02 | Stop reason: HOSPADM

## 2021-03-01 RX ORDER — SODIUM CHLORIDE, SODIUM LACTATE, POTASSIUM CHLORIDE, CALCIUM CHLORIDE 600; 310; 30; 20 MG/100ML; MG/100ML; MG/100ML; MG/100ML
INJECTION, SOLUTION INTRAVENOUS CONTINUOUS
Status: DISCONTINUED | OUTPATIENT
Start: 2021-03-01 | End: 2021-03-02 | Stop reason: HOSPADM

## 2021-03-01 RX ORDER — FENTANYL CITRATE 50 UG/ML
INJECTION, SOLUTION INTRAMUSCULAR; INTRAVENOUS PRN
Status: DISCONTINUED | OUTPATIENT
Start: 2021-03-01 | End: 2021-03-01

## 2021-03-01 RX ORDER — CEFAZOLIN SODIUM 1 G/50ML
1 SOLUTION INTRAVENOUS SEE ADMIN INSTRUCTIONS
Status: DISCONTINUED | OUTPATIENT
Start: 2021-03-01 | End: 2021-03-01 | Stop reason: HOSPADM

## 2021-03-01 RX ORDER — ONDANSETRON 2 MG/ML
4 INJECTION INTRAMUSCULAR; INTRAVENOUS EVERY 30 MIN PRN
Status: DISCONTINUED | OUTPATIENT
Start: 2021-03-01 | End: 2021-03-02 | Stop reason: HOSPADM

## 2021-03-01 RX ORDER — PROPOFOL 10 MG/ML
INJECTION, EMULSION INTRAVENOUS CONTINUOUS PRN
Status: DISCONTINUED | OUTPATIENT
Start: 2021-03-01 | End: 2021-03-01

## 2021-03-01 RX ORDER — SODIUM CHLORIDE, SODIUM LACTATE, POTASSIUM CHLORIDE, CALCIUM CHLORIDE 600; 310; 30; 20 MG/100ML; MG/100ML; MG/100ML; MG/100ML
INJECTION, SOLUTION INTRAVENOUS CONTINUOUS
Status: DISCONTINUED | OUTPATIENT
Start: 2021-03-01 | End: 2021-03-01 | Stop reason: HOSPADM

## 2021-03-01 RX ORDER — OXYCODONE HYDROCHLORIDE 5 MG/1
5-10 TABLET ORAL EVERY 4 HOURS PRN
Qty: 6 TABLET | Refills: 0 | Status: SHIPPED | OUTPATIENT
Start: 2021-03-01 | End: 2021-03-31

## 2021-03-01 RX ORDER — FENTANYL CITRATE 50 UG/ML
25-50 INJECTION, SOLUTION INTRAMUSCULAR; INTRAVENOUS
Status: DISCONTINUED | OUTPATIENT
Start: 2021-03-01 | End: 2021-03-01 | Stop reason: HOSPADM

## 2021-03-01 RX ORDER — LIDOCAINE HYDROCHLORIDE 20 MG/ML
INJECTION, SOLUTION INFILTRATION; PERINEURAL PRN
Status: DISCONTINUED | OUTPATIENT
Start: 2021-03-01 | End: 2021-03-01

## 2021-03-01 RX ORDER — NALOXONE HYDROCHLORIDE 0.4 MG/ML
0.4 INJECTION, SOLUTION INTRAMUSCULAR; INTRAVENOUS; SUBCUTANEOUS
Status: DISCONTINUED | OUTPATIENT
Start: 2021-03-01 | End: 2021-03-02 | Stop reason: HOSPADM

## 2021-03-01 RX ORDER — LIDOCAINE 40 MG/G
CREAM TOPICAL
Status: DISCONTINUED | OUTPATIENT
Start: 2021-03-01 | End: 2021-03-01 | Stop reason: HOSPADM

## 2021-03-01 RX ORDER — OXYCODONE HYDROCHLORIDE 5 MG/1
5 TABLET ORAL EVERY 4 HOURS PRN
Status: DISCONTINUED | OUTPATIENT
Start: 2021-03-01 | End: 2021-03-02 | Stop reason: HOSPADM

## 2021-03-01 RX ORDER — GABAPENTIN 300 MG/1
300 CAPSULE ORAL ONCE
Status: COMPLETED | OUTPATIENT
Start: 2021-03-01 | End: 2021-03-01

## 2021-03-01 RX ORDER — ACETAMINOPHEN 325 MG/1
975 TABLET ORAL ONCE
Status: DISCONTINUED | OUTPATIENT
Start: 2021-03-01 | End: 2021-03-02 | Stop reason: HOSPADM

## 2021-03-01 RX ORDER — ACETAMINOPHEN 325 MG/1
975 TABLET ORAL EVERY 6 HOURS PRN
Qty: 50 TABLET | Refills: 0 | Status: SHIPPED | OUTPATIENT
Start: 2021-03-01 | End: 2022-04-18

## 2021-03-01 RX ORDER — IBUPROFEN 200 MG
800 TABLET ORAL ONCE
Status: DISCONTINUED | OUTPATIENT
Start: 2021-03-01 | End: 2021-03-02 | Stop reason: HOSPADM

## 2021-03-01 RX ORDER — ACETAMINOPHEN 325 MG/1
975 TABLET ORAL ONCE
Status: COMPLETED | OUTPATIENT
Start: 2021-03-01 | End: 2021-03-01

## 2021-03-01 RX ORDER — MEPERIDINE HYDROCHLORIDE 25 MG/ML
12.5 INJECTION INTRAMUSCULAR; INTRAVENOUS; SUBCUTANEOUS
Status: DISCONTINUED | OUTPATIENT
Start: 2021-03-01 | End: 2021-03-02 | Stop reason: HOSPADM

## 2021-03-01 RX ORDER — CEFAZOLIN SODIUM 2 G/50ML
2 SOLUTION INTRAVENOUS
Status: DISCONTINUED | OUTPATIENT
Start: 2021-03-01 | End: 2021-03-01 | Stop reason: HOSPADM

## 2021-03-01 RX ORDER — ONDANSETRON 4 MG/1
4 TABLET, ORALLY DISINTEGRATING ORAL EVERY 30 MIN PRN
Status: DISCONTINUED | OUTPATIENT
Start: 2021-03-01 | End: 2021-03-02 | Stop reason: HOSPADM

## 2021-03-01 RX ORDER — BUPIVACAINE HYDROCHLORIDE 2.5 MG/ML
INJECTION, SOLUTION INFILTRATION; PERINEURAL PRN
Status: DISCONTINUED | OUTPATIENT
Start: 2021-03-01 | End: 2021-03-01 | Stop reason: HOSPADM

## 2021-03-01 RX ORDER — OXYCODONE HYDROCHLORIDE 5 MG/1
5 TABLET ORAL
Status: DISCONTINUED | OUTPATIENT
Start: 2021-03-01 | End: 2021-03-02 | Stop reason: HOSPADM

## 2021-03-01 RX ADMIN — PROPOFOL 125 MCG/KG/MIN: 10 INJECTION, EMULSION INTRAVENOUS at 13:25

## 2021-03-01 RX ADMIN — FENTANYL CITRATE 50 MCG: 50 INJECTION, SOLUTION INTRAMUSCULAR; INTRAVENOUS at 13:36

## 2021-03-01 RX ADMIN — SODIUM CHLORIDE, SODIUM LACTATE, POTASSIUM CHLORIDE, CALCIUM CHLORIDE: 600; 310; 30; 20 INJECTION, SOLUTION INTRAVENOUS at 13:18

## 2021-03-01 RX ADMIN — FENTANYL CITRATE 50 MCG: 50 INJECTION, SOLUTION INTRAMUSCULAR; INTRAVENOUS at 13:23

## 2021-03-01 RX ADMIN — ACETAMINOPHEN 975 MG: 325 TABLET ORAL at 12:17

## 2021-03-01 RX ADMIN — GABAPENTIN 300 MG: 300 CAPSULE ORAL at 12:17

## 2021-03-01 RX ADMIN — LIDOCAINE HYDROCHLORIDE 40 MG: 20 INJECTION, SOLUTION INFILTRATION; PERINEURAL at 13:25

## 2021-03-01 ASSESSMENT — MIFFLIN-ST. JEOR: SCORE: 1723.12

## 2021-03-01 NOTE — ANESTHESIA POSTPROCEDURE EVALUATION
Patient: Adriana Lucia    Procedure(s):  INSERTION, SACRAL NERVE STIMULATOR, STAGE 1    Diagnosis:Overactive bladder [N32.81]  Diagnosis Additional Information: No value filed.    Anesthesia Type:  MAC    Note:  Disposition: Outpatient   Postop Pain Control: Uneventful            Sign Out: Well controlled pain   PONV:    Neuro/Psych: Uneventful            Sign Out: Acceptable/Baseline neuro status   Airway/Respiratory: Uneventful            Sign Out: Acceptable/Baseline resp. status   CV/Hemodynamics: Uneventful            Sign Out: Acceptable CV status   Other NRE:    DID A NON-ROUTINE EVENT OCCUR?          Last vitals:  Vitals:    03/01/21 1140 03/01/21 1529 03/01/21 1542   BP: 130/80 118/77 124/81   Pulse: 83 70 63   Resp: 16 16 16   Temp: 36.3  C (97.3  F) 35.9  C (96.7  F) 36.1  C (97  F)   SpO2: 98% 100% 100%       Last vitals prior to Anesthesia Care Transfer:  CRNA VITALS  3/1/2021 1456 - 3/1/2021 1549      3/1/2021             EKG:  Sinus rhythm          Electronically Signed By: Alexandro Rose MD  March 1, 2021  3:49 PM

## 2021-03-01 NOTE — ANESTHESIA CARE TRANSFER NOTE
Patient: Adriana Lucia    Procedure(s):  INSERTION, SACRAL NERVE STIMULATOR, STAGE 1    Diagnosis: Overactive bladder [N32.81]  Diagnosis Additional Information: No value filed.    Anesthesia Type:   MAC     Note:    Oropharynx: oropharynx clear of all foreign objects  Level of Consciousness: awake  Oxygen Supplementation: room air    Independent Airway: airway patency satisfactory and stable  Dentition: dentition unchanged  Vital Signs Stable: post-procedure vital signs reviewed and stable  Report to RN Given: handoff report given  Patient transferred to: Phase II    Handoff Report: Identifed the Patient, Identified the Reponsible Provider, Reviewed the pertinent medical history, Discussed the surgical course, Reviewed Intra-OP anesthesia mangement and issues during anesthesia, Set expectations for post-procedure period and Allowed opportunity for questions and acknowledgement of understanding      Vitals: (Last set prior to Anesthesia Care Transfer)  CRNA VITALS  3/1/2021 1456 - 3/1/2021 1530      3/1/2021             Pulse:  73    SpO2:  96 %    Resp Rate (observed):  (!) 3        Electronically Signed By: KANE Mcguire CRNA  March 1, 2021  3:30 PM

## 2021-03-01 NOTE — OP NOTE
Urology Operative Summary    Pre-operative diagnosis: Urinary urgency and frequency and urge incontinence   Post-operative diagnosis: Same   Procedure: Stage I Interstim placement  Interpretation of fluoroscopic imaging     Surgeon: Eyad Hawk MD   Assistant(s):       Anesthesia: Local anesthesia with MAC sedation     Estimated blood loss: Less than 10 ml   Complications: None   Condition: Patient taken to recovery in stable condition.     Findings: Placed tined lead in the left S3 foramen with biplanar flouroscopic guidance.     Indications: Adrinaa Lucia is a 50 year old woman with urinary urgency and frequency and urge incontinence refractory to anti-cholinergics. She elected to proceed with a PNE which proved to be beneficial and she has therefore elected to proceed with Interstim placement understanding the risks for infection, pain, bleeding, and the need for future procedures and risks of anesthesia. She received IV antibiotics prior to the procedure initiation.    Procedure: The patient was identified correctly, consented and placed in the prone position. The patient's sacral area was prepped and draped in the usual sterile fashion. Using the fluoroscope in the AP position the medial aspects of the sacral foramena were identified and marked. The fluoroscope was placed in the lateral position and the S3 foramen was identified and marked. The films from the PNE were called up so as to evaluate for identical placement of the permanent electrode.   Marcaine with bicarb was injected at the insertion site to anesthetize the skin. Once this was achieved a needle was inserted on the right side until it was passed through the S3 foramen as seen on fluoroscopy. Next the needle was tested and the patient did not have a sensory and motor responses. This was repeated at the right S4 level, again with no response. The needle was placed on the left at the S3 level with good responses at 1 mAmp. At this point the  stilette was removed from the insertion needle and passer was placed. Her incision was widened to allow the dilator through which our permanent electrode was passed until the appropriate position on fluoroscopy. The electrode was tested and found to have good motor and sensory response at low amplitude. The dilator was then removed under fluoroscopy ensuring our electrode was not displaced.  At this point a second incision approximately 2 cm in length was created at the left buttocks. We used marcaine with bicarb at the insertion site were injected to anesthetize the skin. Herein we created a small pouch that would accommodate our Interstim generator. We then tunneled and connected the test electrode to the generator.  We then closed johan's with interrupted vicryl stitches, closed the deep dermis with a running vicryl stitch and then reapproximated the skin with dermabond at both the generator and electrode incisions. A small island dressing was then placed over these sites.  The patient was awoken from anesthesia and returned to the supine position. All instrument and counts were correct at the end of the procedure.    Plan: Adriana Lucia is a 50 year old woman who underwent stage I interstim placement today   She will follow up for a stage II placement in two weeks.    Eyad Hawk MD  March 1, 2021

## 2021-03-01 NOTE — ANESTHESIA PREPROCEDURE EVALUATION
Anesthesia Pre-Procedure Evaluation    Patient: Adriana Lucia   MRN: [id number] : 1970        Preoperative Diagnosis: Overactive bladder [N32.81]   Procedure : Procedure(s):  INSERTION, SACRAL NERVE STIMULATOR, STAGE 1     Past Medical History:   Diagnosis Date     HSV (herpes simplex virus) infection      Hyperlipidemia      Hypertension       Past Surgical History:   Procedure Laterality Date      SECTION N/A 2015    Procedure:  SECTION;  Surgeon: Meredith Clark MD;  [facility]     wisdom teeth      -      No Known Allergies   Social History     Tobacco Use     Smoking status: Never Smoker     Smokeless tobacco: Former User   Substance Use Topics     Alcohol use: Yes     Alcohol/week: 2.0 standard drinks      Wt Readings from Last 1 Encounters:   21 104.3 kg (230 lb)        Anesthesia Evaluation   Pt has had prior anesthetic. Type: General.    No history of anesthetic complications       ROS/MED HX  ENT/Pulmonary:  - neg pulmonary ROS     Neurologic:  - neg neurologic ROS     Cardiovascular:     (+) hypertension-----    METS/Exercise Tolerance:     Hematologic:  - neg hematologic  ROS     Musculoskeletal:  - neg musculoskeletal ROS     GI/Hepatic:  - neg GI/hepatic ROS     Renal/Genitourinary:  - neg Renal ROS     Endo:     (+) Obesity,     Psychiatric/Substance Use:  - neg psychiatric ROS     Infectious Disease:  - neg infectious disease ROS     Malignancy:       Other:            Physical Exam    Airway  airway exam normal           Respiratory Devices and Support         Dental  no notable dental history         Cardiovascular   cardiovascular exam normal          Pulmonary   pulmonary exam normal                OUTSIDE LABS:  CBC:   Lab Results   Component Value Date    WBC 4.5 2019    WBC 6.4 2015    HGB 13.3 2021    HGB 12.1 2019    HCT 36.4 2019    HCT 28.8 (L) 2015     2019      06/12/2015     BMP:   Lab Results   Component Value Date     02/26/2021     12/14/2020    POTASSIUM 4.2 02/26/2021    POTASSIUM 3.9 12/14/2020    CHLORIDE 112 (H) 02/26/2021    CHLORIDE 107 12/14/2020    CO2 24 02/26/2021    CO2 24 12/14/2020    BUN 18 02/26/2021    BUN 13 12/14/2020    CR 0.82 02/26/2021    CR 0.70 12/14/2020     (H) 02/26/2021    GLC 95 12/14/2020     COAGS: No results found for: PTT, INR, FIBR  POC:   Lab Results   Component Value Date    HCG Negative 03/01/2021     HEPATIC:   Lab Results   Component Value Date    ALT 20 12/06/2017    AST 10 12/06/2017     OTHER:   Lab Results   Component Value Date    DARIELA 8.8 02/26/2021    MAG 4.3 (H) 06/12/2015    TSH 3.10 12/14/2020       Anesthesia Plan    ASA Status:  2   NPO Status:  NPO Appropriate    Anesthesia Type: MAC.     - Reason for MAC: straight local not clinically adequate   Induction: Intravenous.   Maintenance: TIVA.        Consents    Anesthesia Plan(s) and associated risks, benefits, and realistic alternatives discussed. Questions answered and patient/representative(s) expressed understanding.     - Discussed with:  Patient         Postoperative Care    Pain management: Oral pain medications.   PONV prophylaxis: Ondansetron (or other 5HT-3), Dexamethasone or Solumedrol     Comments:                Adolfo Navas MD, MD

## 2021-03-01 NOTE — DISCHARGE INSTRUCTIONS
[facility]  Home Care Following Anesthesia  For 24 hours after surgery:  1. Get plenty of rest.  A responsible adult must stay with you for at least 24 hours after you leave the surgery center.  2. Do not drive or use heavy equipment.  If you have weakness or tingling, don't drive or use heavy equipment until this feeling goes away.   3. Do not drink alcohol.   4. Avoid strenuous or risky activities.  Ask for help when climbing stairs.  5. You may feel lightheaded.  IF so, sit for a few minutes before standing.  Have someone help you get up.   6. If you have nausea (feel sick to your stomach): Drink only clear liquids such as apple juice, ginger ale, broth or 7-Up.  Rest may also help.  Be sure to drink enough fluids.  Move to a regular diet as you feel able.   7. You may have a slight fever.  Call the doctor if your fever is over 100 F (37.7 C) (taken under the tongue) or lasts longer than 24 hours.  8. You may have a dry mouth, a sore throat, muscle aches or trouble sleeping. These should go away after 24 hours.  9. Do not make important or legal decisions.        Tips for taking pain medications  To get the best pain relief possible, remember these points:    Take pain medications as directed, before pain becomes severe.    Pain medication can upset your stomach: taking it with food may help.    Constipation is a common side effect of pain medication. Drink plenty of  fluids.    Eat foods high in fiber. Take a stool softener if recommended by your doctor or pharmacist.    Do not drink alcohol, drive or operate machinery while taking pain medications.    Ask about other ways to control pain, such as with heat, ice or relaxation.    Tylenol/Acetaminophen Consumption  To help encourage the safe use of acetaminophen, the makers of TYLENOL  have lowered the maximum daily dose for single-ingredient Extra Strength TYLENOL  (acetaminophen) products sold in the [place] from 8 pills per  day (4,000 mg) to 6 pills per day (3,000 mg). The dosing interval has also changed from 2 pills every 4-6 hours to 2 pills every 6 hours.    If you feel your pain relief is insufficient, you may take Tylenol/Acetaminophen in addition to your narcotic pain medication.     Be careful not to exceed 3,000 mg of Tylenol/Acetaminophen in a 24 hour period from all sources.    If you are taking extra strength Tylenol/acetaminophen (500 mg), the maximum dose is 6 tablets in 24 hours.    If you are taking regular strength acetaminophen (325 mg), the maximum dose is 9 tablets in 24 hours.    **975 mg Tylenol given at 12:15, can take again at 6:15 PM    Call a doctor for any of the followin. Signs of infection (fever, growing tenderness at the surgery site, a large amount of drainage or bleeding, severe pain, foul-smelling drainage, redness, swelling).  2. It has been over 8 to 10 hours since surgery and you are still not able to urinate (pass water).  3. Headache for over 24 hours.  4. Signs of Covid-19 infection (temperature over 100 degrees, shortness of breath, cough, loss of taste/smell, generalized body aches, persistent headache, chills, sore throat, nausea/vomiting/diarrhea)  Your doctor is:  Dr. Eyad Hawk, Gynecologic Oncology: [phone]                Or dial [phone] and ask for the resident on call for:  Gynecologic Oncology  For emergency care, call the:  [facility]:  [phone] (TTY for hearing impaired: [phone])

## 2021-03-09 DIAGNOSIS — G25.81 RESTLESS LEGS SYNDROME (RLS): ICD-10-CM

## 2021-03-09 NOTE — TELEPHONE ENCOUNTER
Pended rx  Pramipexole (mirapex)  0.25 mg, DISP;  90 TABS, R^1, SIG:  Take 1-3 tablets by mouth at 1.5-2 hours before bed.    Last Written Prescription Date:  12/31/2020  Last Fill Quantity:90,   # refills: 1  Last Office Visit: 11/23/2020  Future Office visit:    Next 5 appointments (look out 90 days)    Mar 12, 2021  9:45 AM  Pre-procedure Covid with [facility]  [facility] ([facility] [address] [facility] [address]  [phone]   Mar 31, 2021  9:00 AM  Return Visit with Eyad Hawk MD  [facility] ([facility] ) [facility]  [address], [address]  [facility] [address] [phone]           Routing refill request to provider for review/approval because:  Drug not on the [facility] or [facility] refill protocol or controlled substance

## 2021-03-10 RX ORDER — PRAMIPEXOLE DIHYDROCHLORIDE 0.25 MG/1
TABLET ORAL
Qty: 90 TABLET | Refills: 1 | Status: SHIPPED | OUTPATIENT
Start: 2021-03-10 | End: 2021-05-11

## 2021-03-12 ENCOUNTER — ANESTHESIA EVENT (OUTPATIENT)
Dept: SURGERY | Facility: AMBULATORY SURGERY CENTER | Age: 51
End: 2021-03-12

## 2021-03-12 DIAGNOSIS — Z11.59 ENCOUNTER FOR SCREENING FOR OTHER VIRAL DISEASES: ICD-10-CM

## 2021-03-12 PROCEDURE — U0003 INFECTIOUS AGENT DETECTION BY NUCLEIC ACID (DNA OR RNA); SEVERE ACUTE RESPIRATORY SYNDROME CORONAVIRUS 2 (SARS-COV-2) (CORONAVIRUS DISEASE [COVID-19]), AMPLIFIED PROBE TECHNIQUE, MAKING USE OF HIGH THROUGHPUT TECHNOLOGIES AS DESCRIBED BY CMS-2020-01-R: HCPCS | Performed by: OBSTETRICS & GYNECOLOGY

## 2021-03-12 PROCEDURE — U0005 INFEC AGEN DETEC AMPLI PROBE: HCPCS | Performed by: OBSTETRICS & GYNECOLOGY

## 2021-03-15 ENCOUNTER — HOSPITAL ENCOUNTER (OUTPATIENT)
Facility: AMBULATORY SURGERY CENTER | Age: 51
Discharge: HOME | End: 2021-03-15
Attending: OBSTETRICS & GYNECOLOGY | Admitting: OBSTETRICS & GYNECOLOGY
Payer: COMMERCIAL

## 2021-03-15 ENCOUNTER — ANESTHESIA (OUTPATIENT)
Dept: SURGERY | Facility: AMBULATORY SURGERY CENTER | Age: 51
End: 2021-03-15

## 2021-03-15 VITALS
OXYGEN SATURATION: 99 % | WEIGHT: 230 LBS | SYSTOLIC BLOOD PRESSURE: 131 MMHG | TEMPERATURE: 97.5 F | BODY MASS INDEX: 36.1 KG/M2 | RESPIRATION RATE: 18 BRPM | HEIGHT: 67 IN | HEART RATE: 75 BPM | DIASTOLIC BLOOD PRESSURE: 90 MMHG

## 2021-03-15 DIAGNOSIS — D18.01 CHERRY ANGIOMA: ICD-10-CM

## 2021-03-15 DIAGNOSIS — G25.81 RESTLESS LEGS SYNDROME (RLS): ICD-10-CM

## 2021-03-15 DIAGNOSIS — O11.9 CHRONIC HYPERTENSION WITH SUPERIMPOSED PREECLAMPSIA: ICD-10-CM

## 2021-03-15 DIAGNOSIS — D23.9 DERMATOFIBROMA: ICD-10-CM

## 2021-03-15 DIAGNOSIS — N32.81 OVERACTIVE BLADDER: ICD-10-CM

## 2021-03-15 DIAGNOSIS — N84.1 CERVICAL POLYP: ICD-10-CM

## 2021-03-15 DIAGNOSIS — M99.05 SOMATIC DYSFUNCTION OF PELVIC REGION: ICD-10-CM

## 2021-03-15 DIAGNOSIS — R03.0 ELEVATED BLOOD PRESSURE READING WITHOUT DIAGNOSIS OF HYPERTENSION: ICD-10-CM

## 2021-03-15 DIAGNOSIS — Z98.891 S/P CESAREAN SECTION: ICD-10-CM

## 2021-03-15 DIAGNOSIS — L91.8 SKIN TAG: ICD-10-CM

## 2021-03-15 DIAGNOSIS — E66.01 MORBID OBESITY (H): ICD-10-CM

## 2021-03-15 DIAGNOSIS — D22.9 MULTIPLE BENIGN NEVI: ICD-10-CM

## 2021-03-15 DIAGNOSIS — Z12.83 SKIN CANCER SCREENING: ICD-10-CM

## 2021-03-15 DIAGNOSIS — I10 CHRONIC HYPERTENSION: ICD-10-CM

## 2021-03-15 DIAGNOSIS — B00.9 HSV (HERPES SIMPLEX VIRUS) INFECTION: Primary | ICD-10-CM

## 2021-03-15 DIAGNOSIS — E55.9 VITAMIN D DEFICIENCY: ICD-10-CM

## 2021-03-15 LAB
HCG UR QL: NEGATIVE
INTERNAL QC OK POCT: YES

## 2021-03-15 PROCEDURE — 64590 INS/RPL PRPH SAC/GSTR NPG/R: CPT | Performed by: OBSTETRICS & GYNECOLOGY

## 2021-03-15 PROCEDURE — 81025 URINE PREGNANCY TEST: CPT | Performed by: PATHOLOGY

## 2021-03-15 RX ORDER — LIDOCAINE 40 MG/G
CREAM TOPICAL
Status: DISCONTINUED | OUTPATIENT
Start: 2021-03-15 | End: 2021-03-16 | Stop reason: HOSPADM

## 2021-03-15 RX ORDER — CEFAZOLIN SODIUM 1 G/3ML
2 INJECTION, POWDER, FOR SOLUTION INTRAMUSCULAR; INTRAVENOUS
Status: DISCONTINUED | OUTPATIENT
Start: 2021-03-15 | End: 2021-03-16 | Stop reason: HOSPADM

## 2021-03-15 RX ORDER — SODIUM CHLORIDE, SODIUM LACTATE, POTASSIUM CHLORIDE, CALCIUM CHLORIDE 600; 310; 30; 20 MG/100ML; MG/100ML; MG/100ML; MG/100ML
INJECTION, SOLUTION INTRAVENOUS CONTINUOUS
Status: DISCONTINUED | OUTPATIENT
Start: 2021-03-15 | End: 2021-03-16 | Stop reason: HOSPADM

## 2021-03-15 RX ORDER — OXYCODONE HYDROCHLORIDE 5 MG/1
5-10 TABLET ORAL EVERY 4 HOURS PRN
Qty: 6 TABLET | Refills: 0 | Status: SHIPPED | OUTPATIENT
Start: 2021-03-15 | End: 2021-03-31

## 2021-03-15 RX ORDER — PROPOFOL 10 MG/ML
INJECTION, EMULSION INTRAVENOUS PRN
Status: DISCONTINUED | OUTPATIENT
Start: 2021-03-15 | End: 2021-03-15

## 2021-03-15 RX ORDER — PROPOFOL 10 MG/ML
INJECTION, EMULSION INTRAVENOUS CONTINUOUS PRN
Status: DISCONTINUED | OUTPATIENT
Start: 2021-03-15 | End: 2021-03-15

## 2021-03-15 RX ORDER — IBUPROFEN 200 MG
800 TABLET ORAL ONCE
Status: CANCELLED | OUTPATIENT
Start: 2021-03-15 | End: 2021-03-15

## 2021-03-15 RX ORDER — KETAMINE HYDROCHLORIDE 10 MG/ML
INJECTION INTRAMUSCULAR; INTRAVENOUS PRN
Status: DISCONTINUED | OUTPATIENT
Start: 2021-03-15 | End: 2021-03-15

## 2021-03-15 RX ORDER — BUPIVACAINE HYDROCHLORIDE 2.5 MG/ML
INJECTION, SOLUTION INFILTRATION; PERINEURAL PRN
Status: DISCONTINUED | OUTPATIENT
Start: 2021-03-15 | End: 2021-03-15 | Stop reason: HOSPADM

## 2021-03-15 RX ORDER — OXYCODONE HYDROCHLORIDE 5 MG/1
5 TABLET ORAL
Status: CANCELLED | OUTPATIENT
Start: 2021-03-15

## 2021-03-15 RX ORDER — GLYCOPYRROLATE 0.2 MG/ML
INJECTION, SOLUTION INTRAMUSCULAR; INTRAVENOUS PRN
Status: DISCONTINUED | OUTPATIENT
Start: 2021-03-15 | End: 2021-03-15

## 2021-03-15 RX ORDER — ONDANSETRON 2 MG/ML
INJECTION INTRAMUSCULAR; INTRAVENOUS PRN
Status: DISCONTINUED | OUTPATIENT
Start: 2021-03-15 | End: 2021-03-15

## 2021-03-15 RX ORDER — IBUPROFEN 800 MG/1
800 TABLET, FILM COATED ORAL EVERY 6 HOURS PRN
Qty: 30 TABLET | Refills: 0 | Status: SHIPPED | OUTPATIENT
Start: 2021-03-15 | End: 2022-04-18

## 2021-03-15 RX ORDER — ACETAMINOPHEN 325 MG/1
975 TABLET ORAL ONCE
Status: CANCELLED | OUTPATIENT
Start: 2021-03-15 | End: 2021-03-15

## 2021-03-15 RX ORDER — CEFAZOLIN SODIUM 1 G/3ML
INJECTION, POWDER, FOR SOLUTION INTRAMUSCULAR; INTRAVENOUS PRN
Status: DISCONTINUED | OUTPATIENT
Start: 2021-03-15 | End: 2021-03-15

## 2021-03-15 RX ADMIN — KETAMINE HYDROCHLORIDE 25 MG: 10 INJECTION INTRAMUSCULAR; INTRAVENOUS at 11:26

## 2021-03-15 RX ADMIN — PROPOFOL 75 MCG/KG/MIN: 10 INJECTION, EMULSION INTRAVENOUS at 11:23

## 2021-03-15 RX ADMIN — PROPOFOL 20 MG: 10 INJECTION, EMULSION INTRAVENOUS at 11:31

## 2021-03-15 RX ADMIN — CEFAZOLIN SODIUM 2 G: 1 INJECTION, POWDER, FOR SOLUTION INTRAMUSCULAR; INTRAVENOUS at 11:23

## 2021-03-15 RX ADMIN — GLYCOPYRROLATE 0.2 MG: 0.2 INJECTION, SOLUTION INTRAMUSCULAR; INTRAVENOUS at 11:17

## 2021-03-15 RX ADMIN — SODIUM CHLORIDE, SODIUM LACTATE, POTASSIUM CHLORIDE, CALCIUM CHLORIDE: 600; 310; 30; 20 INJECTION, SOLUTION INTRAVENOUS at 10:36

## 2021-03-15 RX ADMIN — ONDANSETRON 4 MG: 2 INJECTION INTRAMUSCULAR; INTRAVENOUS at 11:23

## 2021-03-15 RX ADMIN — KETAMINE HYDROCHLORIDE 10 MG: 10 INJECTION INTRAMUSCULAR; INTRAVENOUS at 11:36

## 2021-03-15 ASSESSMENT — MIFFLIN-ST. JEOR: SCORE: 1700.9

## 2021-03-15 NOTE — ANESTHESIA POSTPROCEDURE EVALUATION
Patient: Adriana Lucia    Procedure(s):  INSERTION, SACRAL NERVE STIMULATOR, STAGE 2 Pleaseschedule 2 weeks after stage 1 implant    Diagnosis:Overactive bladder [N32.81]  Diagnosis Additional Information: No value filed.    Anesthesia Type:  MAC    Note:  Disposition: Outpatient   Postop Pain Control: Uneventful            Sign Out: Well controlled pain   PONV: No   Neuro/Psych: Uneventful            Sign Out: Acceptable/Baseline neuro status   Airway/Respiratory: Uneventful            Sign Out: Acceptable/Baseline resp. status   CV/Hemodynamics: Uneventful            Sign Out: Acceptable CV status   Other NRE: NONE   DID A NON-ROUTINE EVENT OCCUR? No         Last vitals:  Vitals:    03/15/21 1149 03/15/21 1205 03/15/21 1218   BP: 129/88 119/74 (!) 131/90   Pulse: 75     Resp: 18 18 18   Temp: 36.3  C (97.4  F)  36.4  C (97.5  F)   SpO2: 97% 100% 99%       Last vitals prior to Anesthesia Care Transfer:  CRNA VITALS  3/15/2021 1116 - 3/15/2021 1216      3/15/2021             Resp Rate (set):  10          Electronically Signed By: Maurice Miller MD  March 15, 2021  1:51 PM

## 2021-03-15 NOTE — ANESTHESIA PREPROCEDURE EVALUATION
Anesthesia Pre-Procedure Evaluation    Patient: Adriana Lucia   MRN: [id number] : 1970        Preoperative Diagnosis: Overactive bladder [N32.81]   Procedure : Procedure(s):  INSERTION, SACRAL NERVE STIMULATOR, STAGE 2 Pleaseschedule 2 weeks after stage 1 implant     Past Medical History:   Diagnosis Date     HSV (herpes simplex virus) infection      Hyperlipidemia      Hypertension       Past Surgical History:   Procedure Laterality Date      SECTION N/A 2015    Procedure:  SECTION;  Surgeon: Meredith Clark MD;  [facility]     IMPLANT STIMULATOR SACRAL NERVE STAGE ONE N/A 3/1/2021    Procedure: INSERTION, SACRAL NERVE STIMULATOR, STAGE 1;  Surgeon: Eyad Hawk MD;  Location: [facility] OR     wisdom teeth            No Known Allergies   Social History     Tobacco Use     Smoking status: Never Smoker     Smokeless tobacco: Former User   Substance Use Topics     Alcohol use: Yes     Alcohol/week: 2.0 standard drinks      Wt Readings from Last 1 Encounters:   21 104.3 kg (230 lb)        Anesthesia Evaluation   Pt has had prior anesthetic. Type: General.    No history of anesthetic complications       ROS/MED HX  ENT/Pulmonary:  - neg pulmonary ROS     Neurologic: Comment: Restless Legs Syndrome    (+) migraines,     Cardiovascular:     (+) Dyslipidemia hypertension-----    METS/Exercise Tolerance: >4 METS    Hematologic:  - neg hematologic  ROS     Musculoskeletal:  - neg musculoskeletal ROS     GI/Hepatic:  - neg GI/hepatic ROS     Renal/Genitourinary: Comment: OAB      Endo:     (+) Obesity,     Psychiatric/Substance Use:  - neg psychiatric ROS     Infectious Disease:  - neg infectious disease ROS     Malignancy:       Other:            Physical Exam    Airway  airway exam normal           Respiratory Devices and Support         Dental  no notable dental history         Cardiovascular   cardiovascular exam normal          Pulmonary   pulmonary exam  normal                OUTSIDE LABS:  CBC:   Lab Results   Component Value Date    WBC 4.5 11/11/2019    WBC 6.4 06/12/2015    HGB 13.3 02/26/2021    HGB 12.1 11/11/2019    HCT 36.4 11/11/2019    HCT 28.8 (L) 06/12/2015     11/11/2019     06/12/2015     BMP:   Lab Results   Component Value Date     02/26/2021     12/14/2020    POTASSIUM 4.2 02/26/2021    POTASSIUM 3.9 12/14/2020    CHLORIDE 112 (H) 02/26/2021    CHLORIDE 107 12/14/2020    CO2 24 02/26/2021    CO2 24 12/14/2020    BUN 18 02/26/2021    BUN 13 12/14/2020    CR 0.82 02/26/2021    CR 0.70 12/14/2020     (H) 02/26/2021    GLC 95 12/14/2020     COAGS: No results found for: PTT, INR, FIBR  POC:   Lab Results   Component Value Date    HCG Negative 03/01/2021     HEPATIC:   Lab Results   Component Value Date    ALT 20 12/06/2017    AST 10 12/06/2017     OTHER:   Lab Results   Component Value Date    DARIELA 8.8 02/26/2021    MAG 4.3 (H) 06/12/2015    TSH 3.10 12/14/2020       Anesthesia Plan    ASA Status:  2   NPO Status:  NPO Appropriate    Anesthesia Type: MAC.      Maintenance: TIVA.        Consents    Anesthesia Plan(s) and associated risks, benefits, and realistic alternatives discussed. Questions answered and patient/representative(s) expressed understanding.     - Discussed with:  Patient      - Specific Concerns: Awareness/recall, cardiopulmonary complications.        Postoperative Care    Pain management: IV analgesics, Oral pain medications, Multi-modal analgesia.   PONV prophylaxis: Ondansetron (or other 5HT-3)     Comments:                Candelario Varela MD

## 2021-03-15 NOTE — DISCHARGE INSTRUCTIONS
"[facility]  Home Care Following Anesthesia  For 24 hours after surgery:  1. Get plenty of rest.  A responsible adult must stay with you for at least 24 hours after you leave the surgery center.  2. Do not drive or use heavy equipment.  If you have weakness or tingling, don't drive or use heavy equipment until this feeling goes away.   3. Do not drink alcohol.   4. Avoid strenuous or risky activities.  Ask for help when climbing stairs.  5. You may feel lightheaded.  IF so, sit for a few minutes before standing.  Have someone help you get up.   6. If you have nausea (feel sick to your stomach): Drink only clear liquids such as apple juice, ginger ale, broth or 7-Up.  Rest may also help.  Be sure to drink enough fluids.  Move to a regular diet as you feel able.   7. You may have a slight fever.  Call the doctor if your fever is over 100 F (37.7 C) (taken under the tongue) or lasts longer than 24 hours.  8. You may have a dry mouth, a sore throat, muscle aches or trouble sleeping. These should go away after 24 hours.  9. Do not make important or legal decisions.        Today you received a Marcaine or bupivacaine block to numb the nerves near your surgery site.  This is a block using local anesthetic or \"numbing\" medication injected around the nerves to anesthetize or \"numb\" the area supplied by those nerves.  This block is injected into the muscle layer near your surgical site.  The medication may numb the location where you had surgery for 6-18 hours, but may last up to 24 hours.  If your surgical site is an arm or leg you should be careful with your affected limb, since it is possible to injure your limb without being aware of it due to the numbing.  Until full feeling returns, you should guard against bumping or hitting your limb, and avoid extreme hot or cold temperatures on the skin.  As the block wears off, the feeling will return as a tingling or prickly sensation near your " surgical site.  You will experience more discomfort from your incision as the feeling returns.  You may want to take a pain pill (a narcotic or Tylenol if this was prescribed by your surgeon) when you start to experience mild pain before the pain beccomes more severe.  If your pain medications do not control your pain you should notifiy your surgeon.    Tips for taking pain medications  To get the best pain relief possible, remember these points:    Take pain medications as directed, before pain becomes severe.    Pain medication can upset your stomach: taking it with food may help.    Constipation is a common side effect of pain medication. Drink plenty of  fluids.    Eat foods high in fiber. Take a stool softener if recommended by your doctor or pharmacist.    Do not drink alcohol, drive or operate machinery while taking pain medications.    Ask about other ways to control pain, such as with heat, ice or relaxation.    Tylenol/Acetaminophen Consumption  To help encourage the safe use of acetaminophen, the makers of TYLENOL  have lowered the maximum daily dose for single-ingredient Extra Strength TYLENOL  (acetaminophen) products sold in the [place] from 8 pills per day (4,000 mg) to 6 pills per day (3,000 mg). The dosing interval has also changed from 2 pills every 4-6 hours to 2 pills every 6 hours.    If you feel your pain relief is insufficient, you may take Tylenol/Acetaminophen in addition to your narcotic pain medication.     Be careful not to exceed 3,000 mg of Tylenol/Acetaminophen in a 24 hour period from all sources.    If you are taking extra strength Tylenol/acetaminophen (500 mg), the maximum dose is 6 tablets in 24 hours.    If you are taking regular strength acetaminophen (325 mg), the maximum dose is 9 tablets in 24 hours.    Call a doctor for any of the followin. Signs of infection (fever, growing tenderness at the surgery site, a large amount of drainage or bleeding, severe pain, foul-smelling  drainage, redness, swelling).  2. It has been over 8 to 10 hours since surgery and you are still not able to urinate (pass water).  3. Headache for over 24 hours.  4. Signs of Covid-19 infection (temperature over 100 degrees, shortness of breath, cough, loss of taste/smell, generalized body aches, persistent headache, chills, sore throat, nausea/vomiting/diarrhea)    Your doctor is:    Dr. Eyad Hawk, Gynecologic Oncology: [phone]  After Hours and Weekends dial: [phone] and ask for the resident on call for:  Gynecologic Oncology  For emergency care, call the:  [facility]:  [phone] (TTY for hearing impaired: [phone])

## 2021-03-31 ENCOUNTER — OFFICE VISIT (OUTPATIENT)
Dept: UROLOGY | Facility: CLINIC | Age: 51
End: 2021-03-31
Attending: OBSTETRICS & GYNECOLOGY
Payer: COMMERCIAL

## 2021-03-31 VITALS
HEART RATE: 87 BPM | DIASTOLIC BLOOD PRESSURE: 86 MMHG | BODY MASS INDEX: 36.18 KG/M2 | WEIGHT: 231 LBS | SYSTOLIC BLOOD PRESSURE: 128 MMHG

## 2021-03-31 DIAGNOSIS — Z98.890 POST-OPERATIVE STATE: Primary | ICD-10-CM

## 2021-03-31 PROCEDURE — G0463 HOSPITAL OUTPT CLINIC VISIT: HCPCS

## 2021-03-31 PROCEDURE — 99024 POSTOP FOLLOW-UP VISIT: CPT | Performed by: OBSTETRICS & GYNECOLOGY

## 2021-03-31 ASSESSMENT — PAIN SCALES - GENERAL: PAINLEVEL: MILD PAIN (2)

## 2021-03-31 NOTE — LETTER
3/31/2021       RE: Adriana Lucia  [address]  [place] [address]     Dear Colleague,    Thank you for referring your patient, Adriana Lucia, to the [facility] [place] at [facility]. Please see a copy of my visit note below.    March 31, 2021    Return visit    Patient returns today s/p SNM stage 2 implant on 3/15. Since surgery she has done extremely well. She is approximately 50% improved from preop, only voiding 8/day and no further urge incontinence. No issues charging the unit.    /86   Pulse 87   Wt 104.8 kg (231 lb)   BMI 36.18 kg/m    She is comfortable, in no distress, non-labored breathing.  Incisions clean, dry and intact.    A/P: 50 year old F s/p SNM stage 2 implant    F/U PRN    Eyad Hawk MD  Professor, OB/GYN  Urogynecologist    CC  Patient Care Team:  Nadira Colon MD as PCP - General (Internal Medicine)  Humera Becerra MD as MD (OB/Gyn)  Zeynep Stauffer PA-C as Physician Assistant (Physician Assistant)  Nadira Colon MD as MD (Internal Medicine)  Oscar Santamaria DO as Assigned Musculoskeletal Provider  Eyad Hawk MD as Assigned OBGYN Provider  Rico Gu MD as Assigned Sleep Provider  Nadira Colon MD as Assigned PCP  SELF, REFERRED

## 2021-03-31 NOTE — PROGRESS NOTES
March 31, 2021    Return visit    Patient returns today s/p SNM stage 2 implant on 3/15. Since surgery she has done extremely well. She is approximately 50% improved from preop, only voiding 8/day and no further urge incontinence. No issues charging the unit.    /86   Pulse 87   Wt 104.8 kg (231 lb)   BMI 36.18 kg/m    She is comfortable, in no distress, non-labored breathing.  Incisions clean, dry and intact.    A/P: 50 year old F s/p SNM stage 2 implant    F/U PRN    Eyad Hawk MD  Professor, OB/GYN  Urogynecologist    CC  Patient Care Team:  Nadira Colon MD as PCP - General (Internal Medicine)  Humera Becerra MD as MD (OB/Gyn)  Zeynep Stauffer PA-C as Physician Assistant (Physician Assistant)  Nadira Colon MD as MD (Internal Medicine)  Oscar Santamaria DO as Assigned Musculoskeletal Provider  Eyad Hawk MD as Assigned OBGYN Provider  Rico Gu MD as Assigned Sleep Provider  Nadira Colon MD as Assigned PCP  SELF, REFERRED

## 2021-05-11 DIAGNOSIS — G25.81 RESTLESS LEGS SYNDROME (RLS): ICD-10-CM

## 2021-05-11 NOTE — TELEPHONE ENCOUNTER
Pended RX  Pramipexole dihydrochloride 0.25mg tabs, DISP:  90 tabs, SIG:  Take 1-3 tablets by mouth at 1.5-2 hours before bed.  R ^1  Last Written Prescription Date:  3/10/2021  Last Fill Quantity:90,   # refills: 1  Last Office Visit: 11/23/2020  Future Office visit:      Routed Dr. Gu for review     Routing refill request to provider for review/approval because:  Drug not on the G, P or [facility] refill protocol or controlled substance

## 2021-05-13 RX ORDER — PRAMIPEXOLE DIHYDROCHLORIDE 0.25 MG/1
TABLET ORAL
Qty: 90 TABLET | Refills: 1 | Status: SHIPPED | OUTPATIENT
Start: 2021-05-13 | End: 2021-06-21

## 2021-06-06 DIAGNOSIS — N39.41 URGE INCONTINENCE: ICD-10-CM

## 2021-06-06 DIAGNOSIS — N32.81 OVERACTIVE BLADDER: ICD-10-CM

## 2021-06-07 NOTE — TELEPHONE ENCOUNTER
Pt notified  Spoke with Adriana-- her prescription is  which is why she requested the refill. Pt reports she has only needed the medication 2 times since it was last prescribed. Sending to pt preferred pharmacy

## 2021-06-08 ENCOUNTER — TELEPHONE (OUTPATIENT)
Dept: UROLOGY | Facility: CLINIC | Age: 51
End: 2021-06-08

## 2021-06-08 NOTE — TELEPHONE ENCOUNTER
Called patient re: her request for refill of her Myrbetriq. Pt states that her Interstim is not working as it had when it was initially implanted in [place]. Connected patient with [employer] rep who will work with her to adjust the settings. Will refill her Myrbetriq if still having symptoms after these adjustments.  Eyad Hawk MD  Professor, OB/GYN  Urogynecologist

## 2021-06-17 ENCOUNTER — TELEPHONE (OUTPATIENT)
Dept: SLEEP MEDICINE | Facility: CLINIC | Age: 51
End: 2021-06-17

## 2021-06-17 NOTE — TELEPHONE ENCOUNTER
Patient called today to reschedule her appointment because I had to leave a message for her telling her that Dr. Gu is out on family emergency.  Patient stated that she will need one more refill before that appointment because his first available is 7/16. I told her I would document that we spoke.

## 2021-06-21 DIAGNOSIS — G25.81 RESTLESS LEGS SYNDROME (RLS): ICD-10-CM

## 2021-06-21 RX ORDER — PRAMIPEXOLE DIHYDROCHLORIDE 0.25 MG/1
TABLET ORAL
Qty: 90 TABLET | Refills: 1 | Status: SHIPPED | OUTPATIENT
Start: 2021-06-21 | End: 2021-07-16

## 2021-06-21 NOTE — TELEPHONE ENCOUNTER
Pending Prescriptions:                       Disp   Refills    pramipexole (MIRAPEX) 0.25 MG tablet      90 tab*1            Sig: Take 1-3 tablets by mouth at 1.5-2 hours before           bed.  Start with 1 tablet, ok to add additional           tablet after 3-4 days if residual symptoms.    Last Written Prescription Date:  5/13/21  Last Fill Quantity: 90,   # refills: 1  Last Office Visit with [facility], KE[facility] or [facility] prescribing provider: 6/7/21  Future Office visit:   7/16/21      KIMANI Webster

## 2021-06-29 RX ORDER — MIRABEGRON 50 MG/1
50 TABLET, EXTENDED RELEASE ORAL DAILY
Qty: 90 TABLET | Refills: 3 | Status: SHIPPED | OUTPATIENT
Start: 2021-06-29 | End: 2022-07-26

## 2021-06-29 NOTE — TELEPHONE ENCOUNTER
Per Dr Hawk note to pt:  Called patient re: her request for refill of her Myrbetriq. Pt states that her Interstim is not working as it had when it was initially implanted in [place]. Connected patient with [employer] who will work with her to adjust the settings. Will refill her Myrbetriq if still having symptoms after these adjustments.  Eyad Hawk MD  Professor, OB/GYN  Refilled per protocol  Urogynecologist

## 2021-07-14 VITALS — BODY MASS INDEX: 36.73 KG/M2 | HEIGHT: 67 IN | WEIGHT: 234 LBS

## 2021-07-14 ASSESSMENT — MIFFLIN-ST. JEOR: SCORE: 1719.05

## 2021-07-14 NOTE — PATIENT INSTRUCTIONS
Your BMI is Body mass index is 36.65 kg/m .  Weight management is a personal decision.  If you are interested in exploring weight loss strategies, the following discussion covers the approaches that may be successful. Body mass index (BMI) is one way to tell whether you are at a healthy weight, overweight, or obese. It measures your weight in relation to your height.  A BMI of 18.5 to 24.9 is in the healthy range. A person with a BMI of 25 to 29.9 is considered overweight, and someone with a BMI of 30 or greater is considered obese. More than two-thirds of American adults are considered overweight or obese.  Being overweight or obese increases the risk for further weight gain. Excess weight may lead to heart disease and diabetes.  Creating and following plans for healthy eating and physical activity may help you improve your health.  Weight control is part of healthy lifestyle and includes exercise, emotional health, and healthy eating habits. Careful eating habits lifelong are the mainstay of weight control. Though there are significant health benefits from weight loss, long-term weight loss with diet alone may be very difficult to achieve- studies show long-term success with dietary management in less than 10% of people. Attaining a healthy weight may be especially difficult to achieve in those with severe obesity. In some cases, medications, devices and surgical management might be considered.  What can you do?  If you are overweight or obese and are interested in methods for weight loss, you should discuss this with your provider.     Consider reducing daily calorie intake by 500 calories.     Keep a food journal.     Avoiding skipping meals, consider cutting portions instead.    Diet combined with exercise helps maintain muscle while optimizing fat loss. Strength training is particularly important for building and maintaining muscle mass. Exercise helps reduce stress, increase energy, and improves fitness.  Increasing exercise without diet control, however, may not burn enough calories to loose weight.       Start walking three days a week 10-20 minutes at a time    Work towards walking thirty minutes five days a week     Eventually, increase the speed of your walking for 1-2 minutes at time    In addition, we recommend that you review healthy lifestyles and methods for weight loss available through the National Institutes of Health patient information sites:  http://win.niddk.nih.gov/publications/index.htm    And look into health and wellness programs that may be available through your health insurance provider, employer, local community center, or [employer].    Weight management plan: Patient was referred to their PCP to discuss a diet and exercise plan.

## 2021-07-14 NOTE — PROGRESS NOTES
"Adriana Lucia is a 50 year old female being evaluated via a billable telephone visit.     \"This telephone visit will be conducted via a call between you and your physician/provider. We have found that certain health care needs can be provided without the need for an in-person visit or physical exam.  This service lets us provide the care you need with a telephone conversation.  If a prescription is necessary we can send it directly to your pharmacy.  If lab work is needed we can place an order for that and you can then stop by our lab to have the test done at a later time.\"    Telephone visits are billed at different rates depending on your insurance coverage.  Please reach out to your insurance provider with any questions.    Patient has given verbal consent for  a Telephone visit? Yes    What telephone number would you like your provider to contact at at:  [phone]    How would you like to obtain your AVS? Jody Ceja, [facility]    Telephone Visit Details:     Telephone Visit Start Time: {telephone visit start/end time for provider to select:478729}    Telephone Visit End Time:{telephone visit start/end time for provider to select:160526}      "

## 2021-07-15 NOTE — PROGRESS NOTES
"Adriana Lucia is a 50 year old female who is being evaluated via a billable telephone visit.       What phone number would you like to be contacted at?@ [phone]  Home Phone [phone]   Work Phone [phone]   Mobile [phone]       How would you like to obtain your AVS? Jody       Telephone Virtual Visit Details     Type of service:  Telephone Virtual Visit     Start Time: 0800  End Time: 8:07 AM    Virtual visit for follow-up of restless leg syndrome with change in medication from gabapentin to pramipexole.     Assessment:  -Restless leg syndrome     Plan:  -Ferritin 73 ng/mL on May 20, 2020  -Prior incomplete response to gabapentin 300-600 mg at bedtime  -Appears well controlled clinically with pramipexole 0.5 mg taken 1-2 hours before bed.  -Plan to continue pramipexole on current dose and plan for follow-up in 1 year.    SUBJECTIVE:  Adriana Lucia is a 50 year old year old female.    Past medical history of restless leg syndrome, hypertension, hyperlipidemia.     10/19/2020 - Office visit and was her first visit with myself.  In summary, incomplete response to gabapentin 300-600 mg at bedtime.  History overall is consistent with restless leg syndrome.  Normal range ferritin of 73 ng/mL on May 20, 2020.  Plan to taper gabapentin and start trial of pramipexole 0.25-0.75 mg taken 1-2 hours before bed.     11/23/2020 - Today, she presents for follow-up of the trial of pramipexole.  She feels it is gone dramatically better and feels it is like a \"night and day difference\".  She has found the pramipexole at 0.5 mg dose has been highly effective and is not aware of any side effects.  She would like to continue the pramipexole and does not desire any changes.    Today -she feels she continues to do very well with the pramipexole, no concerns today, feels she is sleeping well and that the restless leg symptoms are being well controlled.  She is having what sounds like some mild intermittent " leg cramping, that she attributes to her atorvastatin.  She did do a short trial of discontinuing atorvastatin and did have improvement in the symptoms, but she understands it is important to treat her hyperlipidemia we discussed either restarting or discussed with her primary physician about an alternative statin that may have less risk for muscle symptoms.         Past medical history:    Patient Active Problem List    Diagnosis Date Noted     Overactive bladder 2021     Priority: Medium     Added automatically from request for surgery [id number]       Restless legs syndrome (RLS) 2020     Priority: Medium     Morbid obesity (H) 2020     Priority: Medium     Somatic dysfunction of pelvic region 10/17/2018     Priority: Medium     Cherry angioma 2015     Priority: Medium     Skin cancer screening 2015     Priority: Medium     Multiple benign nevi 2015     Priority: Medium     Dermatofibroma 2015     Priority: Medium     Skin tag 2015     Priority: Medium     Chronic hypertension with superimposed preeclampsia 2015     Priority: Medium     S/P  section 2015     Priority: Medium     Chronic hypertension 2015     Priority: Medium     LGA >97th %ile at 36+6 (efw 3871 g) 2015     Priority: Medium     Class: Acute     High-risk pregnancy, AMA 2014     Priority: Medium     Class: Acute     14: Normal NT  Growth US q 3 wks beginning at 28 weeks  Will need weekly BPPs starting at 32 weeks  *please let Nandini Page know when IOL starts/[phone]*       HSV (herpes simplex virus) infection 2014     Priority: Medium     Genital; outbreaks once annually; discussed prophylaxsis starting at 36 weeks.       Cervical polyp 2014     Priority: Medium     Romanian removed polyp, no bleeding since       Elevated blood pressure reading without diagnosis of hypertension 2014     Priority: Medium     4/3/15: /84 today.  2.18.15:  labetalol daily, stopped aspirin  11/24 Agrees to monitor daily; will schedule  appt with Dr Colon  12/2015:Baseline labs drawn-all nml        Vitamin D deficiency 11/24/2014     Priority: Medium     Taking 5,000 iU daily  Problem list name updated by automated process. Provider to review       Hyperlipidemia 06/20/2014     Priority: Medium     FH and previous history of  Problem list name updated by automated process. Provider to review         10 point ROS of systems including Constitutional, Eyes, Respiratory, Cardiovascular, Gastroenterology, Genitourinary, Integumentary, Muscularskeletal, Psychiatric were all negative except for pertinent positives noted in my HPI.    Current Outpatient Medications   Medication Sig Dispense Refill     acetaminophen (TYLENOL) 325 MG tablet Take 3 tablets (975 mg) by mouth every 6 hours as needed for mild pain (Patient not taking: Reported on 6/16/2021) 50 tablet 0     atorvastatin (LIPITOR) 20 MG tablet TAKE ONE TABLET BY MOUTH ONE TIME DAILY  90 tablet 3     enalapril (VASOTEC) 20 MG tablet TAKE 1 TABLET BY MOUTH ONE TIME DAILY  90 tablet 3     ibuprofen (ADVIL/MOTRIN) 800 MG tablet Take 1 tablet (800 mg) by mouth every 6 hours as needed for other (mild and/or inflammatory pain) (Patient not taking: Reported on 6/16/2021) 30 tablet 0     mirabegron (MYRBETRIQ) 50 MG 24 hr tablet Take 1 tablet (50 mg) by mouth daily 90 tablet 3     pramipexole (MIRAPEX) 0.25 MG tablet Take 1-3 tablets by mouth at 1.5-2 hours before bed.  Start with 1 tablet, ok to add additional tablet after 3-4 days if residual symptoms. 90 tablet 1     solifenacin (VESICARE) 5 MG tablet Take 1 tablet (5 mg) by mouth daily (Patient not taking: Reported on 6/16/2021) 90 tablet 3     SUMAtriptan (IMITREX) 50 MG tablet take 1 tablet by mouth at onset of headache for migraine, may repeat dose in 2 hours, max 200mg in 24 hours 12 tablet 0     VITAMIN D, CHOLECALCIFEROL, PO Take 5,000 Units by mouth every other day   "       OBJECTIVE:  Ht 1.702 m (5' 7\")   Wt 106.1 kg (234 lb)   BMI 36.65 kg/m      Physical Exam:  healthy, alert and no distress  PSYCH: Alert and oriented times 3; coherent speech, normal   rate and volume, able to articulate logical thoughts, able   to abstract reason, no tangential thoughts, no hallucinations   or delusions  His affect is normal  RESP: No cough, no audible wheezing, able to talk in full sentences  Remainder of exam unable to be completed due to telephone visits    ---  This note was written with the assistance of the Dragon voice-dictation technology software. The final document, although reviewed, may contain errors. For corrections, please contact the office.    Rico Gu MD    Sleep Medicine  [facility]  ([phone])  [facility]  ([phone])    "

## 2021-07-16 ENCOUNTER — VIRTUAL VISIT (OUTPATIENT)
Dept: SLEEP MEDICINE | Facility: CLINIC | Age: 51
End: 2021-07-16
Payer: COMMERCIAL

## 2021-07-16 DIAGNOSIS — G25.81 RESTLESS LEGS SYNDROME (RLS): ICD-10-CM

## 2021-07-16 PROCEDURE — 99212 OFFICE O/P EST SF 10 MIN: CPT | Mod: TEL | Performed by: FAMILY MEDICINE

## 2021-07-16 RX ORDER — PRAMIPEXOLE DIHYDROCHLORIDE 0.25 MG/1
TABLET ORAL
Qty: 90 TABLET | Refills: 11 | Status: SHIPPED | OUTPATIENT
Start: 2021-07-16 | End: 2022-07-26

## 2021-07-20 DIAGNOSIS — G43.109 MIGRAINE WITH AURA AND WITHOUT STATUS MIGRAINOSUS, NOT INTRACTABLE: ICD-10-CM

## 2021-07-22 RX ORDER — SUMATRIPTAN 50 MG/1
TABLET, FILM COATED ORAL
Qty: 12 TABLET | Refills: 0 | Status: SHIPPED | OUTPATIENT
Start: 2021-07-22 | End: 2022-04-18

## 2021-09-05 ENCOUNTER — HEALTH MAINTENANCE LETTER (OUTPATIENT)
Age: 51
End: 2021-09-05

## 2022-02-20 ENCOUNTER — HEALTH MAINTENANCE LETTER (OUTPATIENT)
Age: 52
End: 2022-02-20

## 2022-03-03 DIAGNOSIS — I10 ESSENTIAL HYPERTENSION, BENIGN: ICD-10-CM

## 2022-03-07 ENCOUNTER — ANCILLARY PROCEDURE (OUTPATIENT)
Dept: MAMMOGRAPHY | Facility: CLINIC | Age: 52
End: 2022-03-07
Attending: INTERNAL MEDICINE
Payer: COMMERCIAL

## 2022-03-07 DIAGNOSIS — Z12.31 VISIT FOR SCREENING MAMMOGRAM: ICD-10-CM

## 2022-03-07 PROCEDURE — 77067 SCR MAMMO BI INCL CAD: CPT | Mod: GC | Performed by: RADIOLOGY

## 2022-03-07 PROCEDURE — 77063 BREAST TOMOSYNTHESIS BI: CPT | Mod: GC | Performed by: RADIOLOGY

## 2022-03-07 RX ORDER — ENALAPRIL MALEATE 20 MG/1
20 TABLET ORAL DAILY
Qty: 60 TABLET | Refills: 0 | Status: SHIPPED | OUTPATIENT
Start: 2022-03-07 | End: 2022-04-18

## 2022-03-07 NOTE — TELEPHONE ENCOUNTER
Enalapril Maleate Oral Tablet 20 MG  Last Written Prescription Date:   12/18/2020  Last Fill Quantity: 90,   # refills: 3  Last Office Visit :  2/26/2021  Future Office visit:   4/18/2022  60 Tabs sent to pharm 3/7/2022      Deepti Ceja RN  Central Triage Red Flags/Med Refills

## 2022-03-31 ENCOUNTER — E-VISIT (OUTPATIENT)
Dept: URGENT CARE | Facility: CLINIC | Age: 52
End: 2022-03-31
Payer: COMMERCIAL

## 2022-03-31 DIAGNOSIS — B96.89 ACUTE BACTERIAL SINUSITIS: Primary | ICD-10-CM

## 2022-03-31 DIAGNOSIS — J01.90 ACUTE BACTERIAL SINUSITIS: Primary | ICD-10-CM

## 2022-03-31 PROCEDURE — 99421 OL DIG E/M SVC 5-10 MIN: CPT | Performed by: FAMILY MEDICINE

## 2022-03-31 NOTE — PATIENT INSTRUCTIONS
Dear Adriana Lucia    After reviewing your responses, I've been able to diagnose you with?a sinus infection caused by bacteria.?     Based on your responses and diagnosis, I have prescribed Augmentin to treat your symptoms. I have sent this to your pharmacy.?     It is also important to stay well hydrated, get lots of rest and take over-the-counter decongestants,?tylenol?or ibuprofen if you?are able to?take those medications per your primary care provider to help relieve discomfort.?     It is important that you take?all of?your prescribed medication even if your symptoms are improving after a few doses.? Taking?all of?your medicine helps prevent the symptoms from returning.?     If your symptoms worsen, you develop severe headache, vomiting, high fever (>102), or are not improving in 7 days, please contact your primary care provider for an appointment or visit any of our convenient Walk-in Care or Urgent Care Centers to be seen which can be found on our website?here.?     Thanks again for choosing?us?as your health care partner,?   ?  Casie Davis MD?

## 2022-04-18 ENCOUNTER — OFFICE VISIT (OUTPATIENT)
Dept: INTERNAL MEDICINE | Facility: CLINIC | Age: 52
End: 2022-04-18
Attending: INTERNAL MEDICINE
Payer: COMMERCIAL

## 2022-04-18 ENCOUNTER — LAB (OUTPATIENT)
Dept: LAB | Facility: CLINIC | Age: 52
End: 2022-04-18
Attending: INTERNAL MEDICINE
Payer: COMMERCIAL

## 2022-04-18 VITALS
WEIGHT: 237 LBS | HEART RATE: 76 BPM | BODY MASS INDEX: 37.12 KG/M2 | SYSTOLIC BLOOD PRESSURE: 124 MMHG | DIASTOLIC BLOOD PRESSURE: 80 MMHG

## 2022-04-18 DIAGNOSIS — G43.109 MIGRAINE WITH AURA AND WITHOUT STATUS MIGRAINOSUS, NOT INTRACTABLE: ICD-10-CM

## 2022-04-18 DIAGNOSIS — Z00.00 ROUTINE GENERAL MEDICAL EXAMINATION AT A HEALTH CARE FACILITY: ICD-10-CM

## 2022-04-18 DIAGNOSIS — Z12.11 ENCOUNTER FOR COLORECTAL CANCER SCREENING: Primary | ICD-10-CM

## 2022-04-18 DIAGNOSIS — I10 ESSENTIAL HYPERTENSION, BENIGN: ICD-10-CM

## 2022-04-18 DIAGNOSIS — Z12.12 ENCOUNTER FOR COLORECTAL CANCER SCREENING: Primary | ICD-10-CM

## 2022-04-18 LAB
ANION GAP SERPL CALCULATED.3IONS-SCNC: 10 MMOL/L (ref 3–14)
BUN SERPL-MCNC: 18 MG/DL (ref 7–30)
CALCIUM SERPL-MCNC: 9.1 MG/DL (ref 8.5–10.1)
CHLORIDE BLD-SCNC: 110 MMOL/L (ref 94–109)
CO2 SERPL-SCNC: 22 MMOL/L (ref 20–32)
CREAT SERPL-MCNC: 0.78 MG/DL (ref 0.52–1.04)
GFR SERPL CREATININE-BSD FRML MDRD: >90 ML/MIN/1.73M2
GLUCOSE BLD-MCNC: 91 MG/DL (ref 70–99)
POTASSIUM BLD-SCNC: 4.1 MMOL/L (ref 3.4–5.3)
SODIUM SERPL-SCNC: 142 MMOL/L (ref 133–144)
TSH SERPL DL<=0.005 MIU/L-ACNC: 2.42 MU/L (ref 0.4–4)

## 2022-04-18 PROCEDURE — G0463 HOSPITAL OUTPT CLINIC VISIT: HCPCS | Performed by: INTERNAL MEDICINE

## 2022-04-18 PROCEDURE — 80048 BASIC METABOLIC PNL TOTAL CA: CPT

## 2022-04-18 PROCEDURE — 99396 PREV VISIT EST AGE 40-64: CPT | Performed by: INTERNAL MEDICINE

## 2022-04-18 PROCEDURE — 84443 ASSAY THYROID STIM HORMONE: CPT

## 2022-04-18 PROCEDURE — 36415 COLL VENOUS BLD VENIPUNCTURE: CPT

## 2022-04-18 RX ORDER — SUMATRIPTAN 50 MG/1
50 TABLET, FILM COATED ORAL
Qty: 12 TABLET | Refills: 4 | Status: SHIPPED | OUTPATIENT
Start: 2022-04-18 | End: 2024-07-10

## 2022-04-18 RX ORDER — ENALAPRIL MALEATE 20 MG/1
20 TABLET ORAL DAILY
Qty: 90 TABLET | Refills: 3 | Status: SHIPPED | OUTPATIENT
Start: 2022-04-18 | End: 2022-07-22

## 2022-04-18 ASSESSMENT — PAIN SCALES - GENERAL: PAINLEVEL: NO PAIN (0)

## 2022-04-18 NOTE — LETTER
4/18/2022       RE: Adriana Lucia  [address]  [facility] [address]     Dear Colleague,    Thank you for referring your patient, Adriana Lucia, to the [facility] [place] at [facility]. Please see a copy of my visit note below.     SUBJECTIVE:   CC: Adriana Lucia is an 51 year old woman who presents for preventive health visit.       Patient has been advised of split billing requirements and indicates understanding: Yes  Healthy Habits:    Do you get at least three servings of calcium containing foods daily (dairy, green leafy vegetables, etc.)? yes    Amount of exercise or daily activities, outside of work: walking 3 times daily    Problems taking medications regularly No    Medication side effects: No    Have you had an eye exam in the past two years? yes    Do you see a dentist twice per year? yes    Do you have sleep apnea, excessive snoring or daytime drowsiness?no      -------------------------------------    Today's PHQ-2 Score:   PHQ-2 ( 1999 Pfizer) 12/14/2020 2/24/2020   Q1: Little interest or pleasure in doing things 0 0   Q2: Feeling down, depressed or hopeless 0 0   PHQ-2 Score 0 0   PHQ-2 Total Score (12-17 Years)- Positive if 3 or more points; Administer PHQ-A if positive 0 0   Q1: Little interest or pleasure in doing things - -   Q2: Feeling down, depressed or hopeless - -   PHQ-2 Score - -       Abuse: Current or Past(Physical, Sexual or Emotional)- No  Do you feel safe in your environment? Yes    Have you ever done Advance Care Planning? (For example, a Health Directive, POLST, or a discussion with a medical provider or your loved ones about your wishes): No, advance care planning information given to patient to review.  Patient plans to discuss their wishes with loved ones or provider.      Social History     Tobacco Use     Smoking status: Never Smoker     Smokeless tobacco: Former User     Quit  date: 2014   Substance Use Topics     Alcohol use: Yes     If you drink alcohol do you typically have >3 drinks per day or >7 drinks per week? No                     Reviewed orders with patient.  Reviewed health maintenance and updated orders accordingly - Yes  Labs reviewed in [facility]-7:   Breast CA Risk Assessment (FHS-7) 3/7/2022   Did any of your first-degree relatives have breast or ovarian cancer? No   Did any of your relatives have bilateral breast cancer? No   Did any man in your family have breast cancer? No   Did any woman in your family have breast and ovarian cancer? No   Did any woman in your family have breast cancer before age 50 y? No   Do you have 2 or more relatives with breast and/or ovarian cancer? No   Do you have 2 or more relatives with breast and/or bowel cancer? No     click delete button to remove this line now  Mammogram Screening: Recommended annual mammography  Pertinent mammograms are reviewed under the imaging tab.    Pertinent mammograms are reviewed under the imaging tab.  History of abnormal Pap smear: NO - age 30-65 PAP every 5 years with negative HPV co-testing recommended  PAP / HPV Latest Ref Rng & Units 2014   PAP (Historical) - NIL NIL   HPV16 NEG:Negative Negative -   HPV18 NEG:Negative Negative -   HRHPV NEG:Negative Negative -     Reviewed and updated as needed this visit by clinical staff   Tobacco  Allergies  Meds                Reviewed and updated as needed this visit by Provider                   Past Medical History:   Diagnosis Date     HSV (herpes simplex virus) infection      Hyperlipidemia      Hypertension       Past Surgical History:   Procedure Laterality Date      SECTION N/A 2015    Procedure:  SECTION;  Surgeon: Meredith Clark MD;  Location:  L+D     IMPLANT STIMULATOR SACRAL NERVE STAGE ONE N/A 3/1/2021    Procedure: INSERTION, SACRAL NERVE STIMULATOR, STAGE 1;  Surgeon: Eyad Hawk MD;   Location: [facility] [place]     IMPLANT STIMULATOR SACRAL NERVE STAGE TWO N/A 3/15/2021    Procedure: INSERTION, SACRAL NERVE STIMULATOR, STAGE 2 Pleaseschedule 2 weeks after stage 1 implant;  Surgeon: Eyad Hawk MD;  Location: [facility] [place] [facility] OR     wisdom teeth      9-2011       ROS:  Review of Systems     Constitutional:  Negative for fever, chills, weight loss, weight gain, fatigue, decreased appetite, night sweats, recent stressors, height gain, height loss, post-operative complications, incisional pain, hallucinations, increased energy, hyperactivity and confused.   HENT:  Negative for ear pain, hearing loss, tinnitus, nosebleeds, trouble swallowing, hoarse voice, mouth sores, sore throat, ear discharge, tooth pain, gum tenderness, taste disturbance, smell disturbance, hearing aid, bleeding gums, dry mouth, sinus pain, sinus congestion and neck mass.    Eyes:  Negative for double vision, pain, redness, eye pain, decreased vision, eye watering, eye bulging, eye dryness, flashing lights, spots, floaters, strabismus, tunnel vision, jaundice and eye irritation.   Respiratory:   Negative for cough, hemoptysis, sputum production, shortness of breath, wheezing, sleep disturbances due to breathing, snores loudly, respiratory pain, dyspnea on exertion, cough disturbing sleep and postural dyspnea.    Cardiovascular:  Negative for chest pain, dyspnea on exertion, palpitations, orthopnea, claudication, leg swelling, fingers/toes turn blue, hypertension, hypotension, syncope, history of heart murmur, chest pain on exertion, chest pain at rest, pacemaker, few scattered varicosities, leg pain, sleep disturbances due to breathing, tachycardia, light-headedness, exercise intolerance and edema.   Gastrointestinal:  Negative for heartburn, nausea, vomiting, abdominal pain, diarrhea, constipation, blood in stool, melena, rectal pain, bloating, hemorrhoids, bowel incontinence, jaundice, rectal bleeding, coffee ground emesis and change in stool.    Genitourinary:  Negative for bladder incontinence, dysuria, urgency, hematuria, flank pain, vaginal discharge, difficulty urinating, genital sores, dyspareunia, decreased libido, nocturia, voiding less frequently, arousal difficulty, abnormal vaginal bleeding, excessive menstruation, menstrual changes, hot flashes, vaginal dryness and postmenopausal bleeding.   Musculoskeletal:  Negative for myalgias, back pain, joint swelling, arthralgias, stiffness, muscle cramps, neck pain, bone pain, muscle weakness and fracture.   Skin:  Negative for nail changes, itching, poor wound healing, rash, hair changes, skin changes, acne, warts, poor wound healing, scarring, flaky skin, Raynaud's phenomenon, sensitivity to sunlight and skin thickening.   Neurological:  Negative for dizziness, tingling, tremors, speech change, seizures, loss of consciousness, weakness, light-headedness, numbness, headaches, disturbances in coordination, extremity numbness, memory loss, difficulty walking and paralysis.   Endo/Heme:  Negative for anemia, swollen glands and bruises/bleeds easily.   Psychiatric/Behavioral:  Negative for depression, hallucinations, memory loss, decreased concentration, mood swings and panic attacks.    Breast:  Negative for breast discharge, breast mass, breast pain and nipple retraction.   Endocrine:  Negative for altered temperature regulation, polyphagia, polydipsia, unwanted hair growth and change in facial hair.        OBJECTIVE:   /80   Pulse 76   Wt 107.5 kg (237 lb)   LMP 04/15/2022   Breastfeeding No   BMI 37.12 kg/m    EXAM:  GENERAL: healthy, alert and no distress  EYES: Eyes grossly normal to inspection, PERRL and conjunctivae and sclerae normal  NECK: no adenopathy, no asymmetry, masses, or scars and thyroid normal to palpation  RESP: lungs clear to auscultation - no rales, rhonchi or wheezes  CV: regular rate and rhythm, normal S1 S2, no S3 or S4, no murmur, click or rub, no peripheral edema and  "peripheral pulses strong  ABDOMEN: soft, nontender, no hepatosplenomegaly, no masses and bowel sounds normal  MS: no gross musculoskeletal defects noted, no edema  NEURO: Normal strength and tone, mentation intact and speech normal  PSYCH: mentation appears normal, affect normal/bright    Diagnostic Test Results:  Labs reviewed in Epic    ASSESSMENT/PLAN:       ICD-10-CM    1. Encounter for colorectal cancer screening  Z12.11 Adult Gastro Ref - Procedure Only    Z12.12    2. Essential hypertension, benign  I10 enalapril (VASOTEC) 20 MG tablet     Basic Metabolic Panel     TSH with free T4 reflex   3. Migraine with aura and without status migrainosus, not intractable  G43.109 SUMAtriptan (IMITREX) 50 MG tablet   4. Routine general medical examination at a health care facility  Z00.00        Patient has been advised of split billing requirements and indicates understanding: Yes  COUNSELING:   Reviewed preventive health counseling, as reflected in patient instructions       Regular exercise       Healthy diet/nutrition    Estimated body mass index is 37.12 kg/m  as calculated from the following:    Height as of 7/14/21: 1.702 m (5' 7\").    Weight as of this encounter: 107.5 kg (237 lb).        She reports that she has never smoked. She quit smokeless tobacco use about 7 years ago.      Counseling Resources:  ATP IV Guidelines  Pooled Cohorts Equation Calculator  Breast Cancer Risk Calculator  BRCA-Related Cancer Risk Assessment: FHS-7 Tool  FRAX Risk Assessment  ICSI Preventive Guidelines  Dietary Guidelines for Americans, 2010  [employer]'s MyPlate  ASA Prophylaxis  Lung CA Screening    Nadira Colon MD  [facility]        Again, thank you for allowing me to participate in the care of your patient.      Sincerely,    Nadira Colon MD      "

## 2022-04-18 NOTE — PROGRESS NOTES
SUBJECTIVE:   CC: Adriana Lucia is an 51 year old woman who presents for preventive health visit.       Patient has been advised of split billing requirements and indicates understanding: Yes  Healthy Habits:    Do you get at least three servings of calcium containing foods daily (dairy, green leafy vegetables, etc.)? yes    Amount of exercise or daily activities, outside of work: walking 3 times daily    Problems taking medications regularly No    Medication side effects: No    Have you had an eye exam in the past two years? yes    Do you see a dentist twice per year? yes    Do you have sleep apnea, excessive snoring or daytime drowsiness?no      -------------------------------------    Today's PHQ-2 Score:   PHQ-2 ( 1999 Pfizer) 12/14/2020 2/24/2020   Q1: Little interest or pleasure in doing things 0 0   Q2: Feeling down, depressed or hopeless 0 0   PHQ-2 Score 0 0   PHQ-2 Total Score (12-17 Years)- Positive if 3 or more points; Administer PHQ-A if positive 0 0   Q1: Little interest or pleasure in doing things - -   Q2: Feeling down, depressed or hopeless - -   PHQ-2 Score - -       Abuse: Current or Past(Physical, Sexual or Emotional)- No  Do you feel safe in your environment? Yes    Have you ever done Advance Care Planning? (For example, a Health Directive, POLST, or a discussion with a medical provider or your loved ones about your wishes): No, advance care planning information given to patient to review.  Patient plans to discuss their wishes with loved ones or provider.      Social History     Tobacco Use     Smoking status: Never Smoker     Smokeless tobacco: Former User     Quit date: 8/8/2014   Substance Use Topics     Alcohol use: Yes     If you drink alcohol do you typically have >3 drinks per day or >7 drinks per week? No                     Reviewed orders with patient.  Reviewed health maintenance and updated orders accordingly - Yes  [facility]   Breast CA Risk Assessment  (FHS-7) 3/7/2022   Did any of your first-degree relatives have breast or ovarian cancer? No   Did any of your relatives have bilateral breast cancer? No   Did any man in your family have breast cancer? No   Did any woman in your family have breast and ovarian cancer? No   Did any woman in your family have breast cancer before age 50 y? No   Do you have 2 or more relatives with breast and/or ovarian cancer? No   Do you have 2 or more relatives with breast and/or bowel cancer? No     click delete button to remove this line now  Mammogram Screening: Recommended annual mammography  Pertinent mammograms are reviewed under the imaging tab.    Pertinent mammograms are reviewed under the imaging tab.  History of abnormal Pap smear: NO - age 30-65 PAP every 5 years with negative HPV co-testing recommended  PAP / HPV Latest Ref Rng & Units 2014   PAP (Historical) - NIL NIL   HPV16 NEG:Negative Negative -   HPV18 NEG:Negative Negative -   HRHPV NEG:Negative Negative -     Reviewed and updated as needed this visit by clinical staff   Tobacco  Allergies  Meds                Reviewed and updated as needed this visit by Provider                   Past Medical History:   Diagnosis Date     HSV (herpes simplex virus) infection      Hyperlipidemia      Hypertension       Past Surgical History:   Procedure Laterality Date      SECTION N/A 2015    Procedure:  SECTION;  Surgeon: Meredith Clark MD;  Location: [facility] L+D     IMPLANT STIMULATOR SACRAL NERVE STAGE ONE N/A 3/1/2021    Procedure: INSERTION, SACRAL NERVE STIMULATOR, STAGE 1;  Surgeon: Eyad Hawk MD;  Location: [facility] [place]     IMPLANT STIMULATOR SACRAL NERVE STAGE TWO N/A 3/15/2021    Procedure: INSERTION, SACRAL NERVE STIMULATOR, STAGE 2 Pleaseschedule 2 weeks after stage 1 implant;  Surgeon: Eyad Hawk MD;  Location: [facility] [place]     wisdom teeth             ROS:  Review of Systems     Constitutional:  Negative for  fever, chills, weight loss, weight gain, fatigue, decreased appetite, night sweats, recent stressors, height gain, height loss, post-operative complications, incisional pain, hallucinations, increased energy, hyperactivity and confused.   HENT:  Negative for ear pain, hearing loss, tinnitus, nosebleeds, trouble swallowing, hoarse voice, mouth sores, sore throat, ear discharge, tooth pain, gum tenderness, taste disturbance, smell disturbance, hearing aid, bleeding gums, dry mouth, sinus pain, sinus congestion and neck mass.    Eyes:  Negative for double vision, pain, redness, eye pain, decreased vision, eye watering, eye bulging, eye dryness, flashing lights, spots, floaters, strabismus, tunnel vision, jaundice and eye irritation.   Respiratory:   Negative for cough, hemoptysis, sputum production, shortness of breath, wheezing, sleep disturbances due to breathing, snores loudly, respiratory pain, dyspnea on exertion, cough disturbing sleep and postural dyspnea.    Cardiovascular:  Negative for chest pain, dyspnea on exertion, palpitations, orthopnea, claudication, leg swelling, fingers/toes turn blue, hypertension, hypotension, syncope, history of heart murmur, chest pain on exertion, chest pain at rest, pacemaker, few scattered varicosities, leg pain, sleep disturbances due to breathing, tachycardia, light-headedness, exercise intolerance and edema.   Gastrointestinal:  Negative for heartburn, nausea, vomiting, abdominal pain, diarrhea, constipation, blood in stool, melena, rectal pain, bloating, hemorrhoids, bowel incontinence, jaundice, rectal bleeding, coffee ground emesis and change in stool.   Genitourinary:  Negative for bladder incontinence, dysuria, urgency, hematuria, flank pain, vaginal discharge, difficulty urinating, genital sores, dyspareunia, decreased libido, nocturia, voiding less frequently, arousal difficulty, abnormal vaginal bleeding, excessive menstruation, menstrual changes, hot flashes,  vaginal dryness and postmenopausal bleeding.   Musculoskeletal:  Negative for myalgias, back pain, joint swelling, arthralgias, stiffness, muscle cramps, neck pain, bone pain, muscle weakness and fracture.   Skin:  Negative for nail changes, itching, poor wound healing, rash, hair changes, skin changes, acne, warts, poor wound healing, scarring, flaky skin, Raynaud's phenomenon, sensitivity to sunlight and skin thickening.   Neurological:  Negative for dizziness, tingling, tremors, speech change, seizures, loss of consciousness, weakness, light-headedness, numbness, headaches, disturbances in coordination, extremity numbness, memory loss, difficulty walking and paralysis.   Endo/Heme:  Negative for anemia, swollen glands and bruises/bleeds easily.   Psychiatric/Behavioral:  Negative for depression, hallucinations, memory loss, decreased concentration, mood swings and panic attacks.    Breast:  Negative for breast discharge, breast mass, breast pain and nipple retraction.   Endocrine:  Negative for altered temperature regulation, polyphagia, polydipsia, unwanted hair growth and change in facial hair.        OBJECTIVE:   /80   Pulse 76   Wt 107.5 kg (237 lb)   LMP 04/15/2022   Breastfeeding No   BMI 37.12 kg/m    EXAM:  GENERAL: healthy, alert and no distress  EYES: Eyes grossly normal to inspection, PERRL and conjunctivae and sclerae normal  NECK: no adenopathy, no asymmetry, masses, or scars and thyroid normal to palpation  RESP: lungs clear to auscultation - no rales, rhonchi or wheezes  CV: regular rate and rhythm, normal S1 S2, no S3 or S4, no murmur, click or rub, no peripheral edema and peripheral pulses strong  ABDOMEN: soft, nontender, no hepatosplenomegaly, no masses and bowel sounds normal  MS: no gross musculoskeletal defects noted, no edema  NEURO: Normal strength and tone, mentation intact and speech normal  PSYCH: mentation appears normal, affect normal/bright    Diagnostic Test  "Results:  Labs reviewed in Epic    ASSESSMENT/PLAN:       ICD-10-CM    1. Encounter for colorectal cancer screening  Z12.11 Adult Gastro Ref - Procedure Only    Z12.12    2. Essential hypertension, benign  I10 enalapril (VASOTEC) 20 MG tablet     Basic Metabolic Panel     TSH with free T4 reflex   3. Migraine with aura and without status migrainosus, not intractable  G43.109 SUMAtriptan (IMITREX) 50 MG tablet   4. Routine general medical examination at a health care facility  Z00.00        Patient has been advised of split billing requirements and indicates understanding: Yes  COUNSELING:   Reviewed preventive health counseling, as reflected in patient instructions       Regular exercise       Healthy diet/nutrition    Estimated body mass index is 37.12 kg/m  as calculated from the following:    Height as of 7/14/21: 1.702 m (5' 7\").    Weight as of this encounter: 107.5 kg (237 lb).        She reports that she has never smoked. She quit smokeless tobacco use about 7 years ago.      Counseling Resources:  ATP IV Guidelines  Pooled Cohorts Equation Calculator  Breast Cancer Risk Calculator  BRCA-Related Cancer Risk Assessment: FHS-7 Tool  FRAX Risk Assessment  ICSI Preventive Guidelines  Dietary Guidelines for Americans, 2010  [employer]'s MyPlate  ASA Prophylaxis  Lung CA Screening    Nadira Colon MD  [facility] [place]    "

## 2022-04-18 NOTE — LETTER
Date:April 19, 2022      Patient was self referred, no letter generated. Do not send.        [facility] Health Information

## 2022-04-19 ASSESSMENT — ENCOUNTER SYMPTOMS
EXTREMITY NUMBNESS: 0
LEG PAIN: 0
HEARTBURN: 0
DECREASED LIBIDO: 0
MEMORY LOSS: 0
SKIN CHANGES: 0
HALLUCINATIONS: 0
NERVOUS/ANXIOUS: 0
HEMOPTYSIS: 0
FATIGUE: 0
BLOOD IN STOOL: 0
RECTAL BLEEDING: 0
TASTE DISTURBANCE: 0
TREMORS: 0
LEG SWELLING: 0
SEIZURES: 0
EXERCISE INTOLERANCE: 0
PANIC: 0
ORTHOPNEA: 0
NIGHT SWEATS: 0
TINGLING: 0
BREAST PAIN: 0
NECK MASS: 0
HYPOTENSION: 0
CLAUDICATION: 0
HYPERTENSION: 0
COUGH DISTURBING SLEEP: 0
SLEEP DISTURBANCES DUE TO BREATHING: 0
FLANK PAIN: 0
SPUTUM PRODUCTION: 0
ABDOMINAL PAIN: 0
SMELL DISTURBANCE: 0
POLYDIPSIA: 0
VOMITING: 0
EYE IRRITATION: 0
ARTHRALGIAS: 0
DIARRHEA: 0
TROUBLE SWALLOWING: 0
INSOMNIA: 0
WEIGHT LOSS: 0
DIFFICULTY URINATING: 0
SNORES LOUDLY: 0
INCREASED ENERGY: 0
POOR WOUND HEALING: 0
SINUS PAIN: 0
ALTERED TEMPERATURE REGULATION: 0
LIGHT-HEADEDNESS: 0
STIFFNESS: 0
MYALGIAS: 0
WHEEZING: 0
DYSURIA: 0
SPEECH CHANGE: 0
DEPRESSION: 0
DOUBLE VISION: 0
JAUNDICE: 0
LOSS OF CONSCIOUSNESS: 0
PALPITATIONS: 0
DYSPNEA ON EXERTION: 0
HOARSE VOICE: 0
BREAST MASS: 0
BACK PAIN: 0
JOINT SWELLING: 0
WEIGHT GAIN: 0
HEADACHES: 0
SYNCOPE: 0
NUMBNESS: 0
EYE REDNESS: 0
MUSCLE WEAKNESS: 0
SORE THROAT: 0
RECTAL PAIN: 0
BOWEL INCONTINENCE: 0
TACHYCARDIA: 0
WEAKNESS: 0
MUSCLE CRAMPS: 0
FEVER: 0
BLOATING: 0
DECREASED APPETITE: 0
EYE WATERING: 0
SINUS CONGESTION: 0
CONSTIPATION: 0
HOT FLASHES: 0
COUGH: 0
CHILLS: 0
DECREASED CONCENTRATION: 0
NAUSEA: 0
BRUISES/BLEEDS EASILY: 0
DISTURBANCES IN COORDINATION: 0
SHORTNESS OF BREATH: 0
EYE PAIN: 0
RESPIRATORY PAIN: 0
POSTURAL DYSPNEA: 0
HEMATURIA: 0
POLYPHAGIA: 0
NECK PAIN: 0
PARALYSIS: 0
SWOLLEN GLANDS: 0
NAIL CHANGES: 0
DIZZINESS: 0

## 2022-04-21 ENCOUNTER — TELEPHONE (OUTPATIENT)
Dept: GASTROENTEROLOGY | Facility: CLINIC | Age: 52
End: 2022-04-21
Payer: COMMERCIAL

## 2022-04-21 DIAGNOSIS — Z11.59 ENCOUNTER FOR SCREENING FOR OTHER VIRAL DISEASES: Primary | ICD-10-CM

## 2022-04-21 NOTE — TELEPHONE ENCOUNTER
Caller: LOUIE    Procedure: COLON    Date, Location, and Surgeon of Procedure Cancelled: 5/6, MANUEL LOPEZ    Ordering Provider:    Reason for cancel (please be detailed, any staff messages or encounters to note?): PATIENT RIDE CHANGED SCHEDUE         Rescheduled: YES     If rescheduled:    Date: 5/23   Location: [facility] [place]   Prep Resent: Y(changes to prep?)   Covid Test Rescheduled: Yes and No   Note any change or update to original order/sedation: N    4/22/2022 GK: Pt would like a prescription for prep if possible to cut costs.  Pharm: [id number] - [place], [place] - [address] [place] [address]

## 2022-04-21 NOTE — TELEPHONE ENCOUNTER
Screening Questions  BlueKIND OF PREP RedLOCATION [review exclusion criteria] GreenSEDATION TYPE  1. Have you had a positive covid test in the last 90 days? N     2. Do you have a legal guardian or medical Power of ?  Are you able to give consent for your medical care?Y (Sedation review/consideration needed)    3. Are you active on mychart? Y    4. What insurance is in the chart? [employer] & [employer]     3.   Ordering/Referring Provider: JEANINE    4. BMI 36.8 [BMI OVER 40-EXTENDED PREP]  If greater than 40 review exclusion criteria [PAC APPT IF @ [facility]]        5.  Respiratory Screening :  [If yes to any of the following HOSPITAL setting only]     Do you use daily home oxygen? N    Do you have mod to severe Obstructive Sleep Apnea? N  [OKAY @ [facility] SH PH RI]   Do you have Pulmonary Hypertension? N     Do you have UNCONTROLLED asthma? N        6.   Have you had a heart or lung transplant? N      7.   Are you currently on dialysis? N [ If yes, G-PREP & HOSPITAL setting only]     8.   Do you have chronic kidney disease? N [ If yes, G-PREP ]    9.   Have you had a stroke or Transient ischemic attack (TIA - aka  mini stroke ) within 6 months?  N (If yes, please review exclusion criteria)    10.   In the past 6 months, have you had any heart related issues including cardiomyopathy or heart attack? N           If yes, did it require cardiac stenting or other implantable device?       11.   Do you have any implantable devices in your body (pacemaker, defib, LVAD)? N (If yes, please review exclusion criteria)    12.   Do you take nitroglycerin? N           If yes, how often?   (if yes, HOSPITAL setting ONLY)    13.   Are you currently taking any blood thinners? N           [IF YES, INFORM PATIENT TO FOLLOW UP W/ ORDERING PROVIDER FOR BRIDGING INSTRUCTIONS]     14.   Do you have a diagnosis of diabetes? N   [ If yes, G-PREP ]    15.   [FEMALES] Are you currently pregnant?     If yes, how many weeks?     16.   Are you  taking any prescription pain medications on a routine schedule?  N  [ If yes, EXTENDED PREP.] [If yes, MAC]    17.   Do you have any chemical dependencies such as alcohol, street drugs, or methadone?  N [If yes, MAC]    18.   Do you have any history of post-traumatic stress syndrome, severe anxiety or history of psychosis?  N  [If yes, MAC]    19.   Do you transfer independently?  Y    20.  On a regular basis do you go 3-5 days between bowel movements? N   [ If yes, EXTENDED PREP.]    21.   Preferred LOCAL Pharmacy for Pre Prescription        [id number] [address]    Scheduling Details      Caller : Adriana Lucia    (Please ask for phone number if not scheduled by patient)    Type of Procedure Scheduled: COLON  Which Colonoscopy Prep was Sent?: LOU NUGENT CF PATIENTS & GROEN'S PATIENTS NEEDS EXTENDED PREP  Surgeon: JESSICA  Date of Procedure: 5/9/22  Location: [facility] [place]      Sedation Type: CS  Conscious Sedation- Needs  for 6 hours after the procedure  MAC/General-Needs  for 24 hours after procedure    Pre-op Required at [facility] [place], [place] and OR for MAC sedation: N  (advise patient they will need a pre-op prior to procedure -)      Informed patient they will need an adult  Y  Cannot take any type of public or medical transportation alone    [facility]: 5/6/22 @[facility] Nurse will call to complete assessment Y    Additional comments:

## 2022-04-23 DIAGNOSIS — Z11.59 ENCOUNTER FOR SCREENING FOR OTHER VIRAL DISEASES: Primary | ICD-10-CM

## 2022-05-13 ENCOUNTER — TELEPHONE (OUTPATIENT)
Dept: GASTROENTEROLOGY | Facility: CLINIC | Age: 52
End: 2022-05-13
Payer: COMMERCIAL

## 2022-05-13 DIAGNOSIS — Z12.11 ENCOUNTER FOR SCREENING COLONOSCOPY: Primary | ICD-10-CM

## 2022-05-13 NOTE — TELEPHONE ENCOUNTER
Called the Pt to discuss below. She did , however is not able to talk at the moment. She will call us back later today.     Patient scheduled for Colonoscopy on 5/23/22.     Covid test scheduled: 5/20/22    Arrival time: 1:45pm    Facility location: [facility]    Sedation type: CS    Indication for procedure: Screening    Anticoagulations? None     Sacral Nerve Stimulator -- Will have Pt bring remote with to procedure.     Bowel prep recommendation: Miralax    Prep instructions sent via [email or web address]    Pre visit planning completed.    Yoon Montano RN

## 2022-05-16 RX ORDER — BISACODYL 5 MG/1
TABLET, DELAYED RELEASE ORAL
Qty: 4 TABLET | Refills: 0 | Status: SHIPPED | OUTPATIENT
Start: 2022-05-16 | End: 2023-03-21

## 2022-05-16 RX ORDER — POLYETHYLENE GLYCOL 3350 17 G/17G
POWDER, FOR SOLUTION ORAL
Qty: 238 G | Refills: 0 | Status: SHIPPED | OUTPATIENT
Start: 2022-05-16 | End: 2023-03-21

## 2022-05-16 NOTE — TELEPHONE ENCOUNTER
Pre assessment questions completed for upcoming colonoscopy procedure scheduled on 5.23.2022    COVID test scheduled 5.20.2022    Reviewed procedural arrival time 1345 and facility location [facility].    Designated  policy reviewed. Instructed to have someone stay 6 hours post procedure.     Anticoagulation/blood thinners? no    Electronic implanted devices? Interstim; pt states she can turn off from her phone.    Reviewed Miralax/Magnesium citrate/Dulcolax prep instructions with patient. No fiber/iron supplements or foods that contain nuts/seeds prior to procedure.     Patient verbalized understanding and had no questions or concerns at this time.    Latesha Escamilla RN

## 2022-05-20 ENCOUNTER — LAB (OUTPATIENT)
Dept: LAB | Facility: CLINIC | Age: 52
End: 2022-05-20
Payer: COMMERCIAL

## 2022-05-20 DIAGNOSIS — Z11.59 ENCOUNTER FOR SCREENING FOR OTHER VIRAL DISEASES: ICD-10-CM

## 2022-05-20 PROCEDURE — U0003 INFECTIOUS AGENT DETECTION BY NUCLEIC ACID (DNA OR RNA); SEVERE ACUTE RESPIRATORY SYNDROME CORONAVIRUS 2 (SARS-COV-2) (CORONAVIRUS DISEASE [COVID-19]), AMPLIFIED PROBE TECHNIQUE, MAKING USE OF HIGH THROUGHPUT TECHNOLOGIES AS DESCRIBED BY CMS-2020-01-R: HCPCS

## 2022-05-20 PROCEDURE — U0005 INFEC AGEN DETEC AMPLI PROBE: HCPCS

## 2022-05-21 LAB — SARS-COV-2 RNA RESP QL NAA+PROBE: NEGATIVE

## 2022-05-23 ENCOUNTER — HOSPITAL ENCOUNTER (OUTPATIENT)
Facility: AMBULATORY SURGERY CENTER | Age: 52
Discharge: HOME | End: 2022-05-23
Attending: INTERNAL MEDICINE
Payer: COMMERCIAL

## 2022-05-23 ENCOUNTER — ANESTHESIA EVENT (OUTPATIENT)
Dept: SURGERY | Facility: AMBULATORY SURGERY CENTER | Age: 52
End: 2022-05-23
Payer: COMMERCIAL

## 2022-05-23 ENCOUNTER — ANESTHESIA (OUTPATIENT)
Dept: SURGERY | Facility: AMBULATORY SURGERY CENTER | Age: 52
End: 2022-05-23
Payer: COMMERCIAL

## 2022-05-23 VITALS
HEART RATE: 80 BPM | OXYGEN SATURATION: 98 % | DIASTOLIC BLOOD PRESSURE: 82 MMHG | TEMPERATURE: 97.4 F | RESPIRATION RATE: 16 BRPM | SYSTOLIC BLOOD PRESSURE: 125 MMHG

## 2022-05-23 VITALS — HEART RATE: 88 BPM

## 2022-05-23 LAB
COLONOSCOPY: NORMAL
HCG UR QL: NEGATIVE
INTERNAL QC OK POCT: NORMAL
POCT KIT EXPIRATION DATE: 0
POCT KIT LOT NUMBER: 0

## 2022-05-23 PROCEDURE — 45380 COLONOSCOPY AND BIOPSY: CPT | Mod: 33

## 2022-05-23 PROCEDURE — 81025 URINE PREGNANCY TEST: CPT | Performed by: PATHOLOGY

## 2022-05-23 PROCEDURE — 88305 TISSUE EXAM BY PATHOLOGIST: CPT | Mod: TC | Performed by: INTERNAL MEDICINE

## 2022-05-23 RX ORDER — LIDOCAINE HYDROCHLORIDE 20 MG/ML
INJECTION, SOLUTION INFILTRATION; PERINEURAL PRN
Status: DISCONTINUED | OUTPATIENT
Start: 2022-05-23 | End: 2022-05-23

## 2022-05-23 RX ORDER — SODIUM CHLORIDE, SODIUM LACTATE, POTASSIUM CHLORIDE, CALCIUM CHLORIDE 600; 310; 30; 20 MG/100ML; MG/100ML; MG/100ML; MG/100ML
INJECTION, SOLUTION INTRAVENOUS CONTINUOUS PRN
Status: DISCONTINUED | OUTPATIENT
Start: 2022-05-23 | End: 2022-05-23

## 2022-05-23 RX ORDER — PROPOFOL 10 MG/ML
INJECTION, EMULSION INTRAVENOUS PRN
Status: DISCONTINUED | OUTPATIENT
Start: 2022-05-23 | End: 2022-05-23

## 2022-05-23 RX ORDER — PROPOFOL 10 MG/ML
INJECTION, EMULSION INTRAVENOUS CONTINUOUS PRN
Status: DISCONTINUED | OUTPATIENT
Start: 2022-05-23 | End: 2022-05-23

## 2022-05-23 RX ORDER — ONDANSETRON 2 MG/ML
4 INJECTION INTRAMUSCULAR; INTRAVENOUS
Status: DISCONTINUED | OUTPATIENT
Start: 2022-05-23 | End: 2022-05-23 | Stop reason: HOSPADM

## 2022-05-23 RX ORDER — LIDOCAINE 40 MG/G
CREAM TOPICAL
Status: DISCONTINUED | OUTPATIENT
Start: 2022-05-23 | End: 2022-05-23 | Stop reason: HOSPADM

## 2022-05-23 RX ADMIN — SODIUM CHLORIDE, SODIUM LACTATE, POTASSIUM CHLORIDE, CALCIUM CHLORIDE: 600; 310; 30; 20 INJECTION, SOLUTION INTRAVENOUS at 15:57

## 2022-05-23 RX ADMIN — PROPOFOL 50 MG: 10 INJECTION, EMULSION INTRAVENOUS at 15:25

## 2022-05-23 RX ADMIN — PROPOFOL 100 MG: 10 INJECTION, EMULSION INTRAVENOUS at 16:02

## 2022-05-23 RX ADMIN — PROPOFOL 200 MCG/KG/MIN: 10 INJECTION, EMULSION INTRAVENOUS at 16:02

## 2022-05-23 RX ADMIN — LIDOCAINE HYDROCHLORIDE 60 MG: 20 INJECTION, SOLUTION INFILTRATION; PERINEURAL at 16:02

## 2022-05-23 NOTE — ANESTHESIA POSTPROCEDURE EVALUATION
Patient: Adriana Lucia    Procedure: Procedure(s):  COLONOSCOPY, WITH POLYPECTOMY       Anesthesia Type:  MAC    Note:  Disposition: Outpatient   Postop Pain Control: Uneventful            Sign Out: Well controlled pain   PONV: No   Neuro/Psych: Uneventful            Sign Out: Acceptable/Baseline neuro status   Airway/Respiratory: Uneventful            Sign Out: Acceptable/Baseline resp. status   CV/Hemodynamics: Uneventful            Sign Out: Acceptable CV status; No obvious hypovolemia; No obvious fluid overload   Other NRE: NONE   DID A NON-ROUTINE EVENT OCCUR? No           Last vitals:  Vitals Value Taken Time   /82 05/23/22 1645   Temp 36.2  C (97.2  F) 05/23/22 1633   Pulse 80 05/23/22 1645   Resp 16 05/23/22 1645   SpO2 98 % 05/23/22 1633       Electronically Signed By: Kellen Tejada MD  May 23, 2022  4:46 PM

## 2022-05-23 NOTE — H&P
Adriana Lucia  [id number]  female  51 year old      Reason for procedure/surgery: screening    Patient Active Problem List   Diagnosis     Hyperlipidemia     High-risk pregnancy, AMA     HSV (herpes simplex virus) infection     Cervical polyp     Elevated blood pressure reading without diagnosis of hypertension     Vitamin D deficiency     LGA >97th %ile at 36+6 (efw 3871 g)     Chronic hypertension     S/P  section     Chronic hypertension with superimposed preeclampsia     Cherry angioma     Skin cancer screening     Multiple benign nevi     Dermatofibroma     Skin tag     Somatic dysfunction of pelvic region     Morbid obesity (H)     Restless legs syndrome (RLS)     Overactive bladder       Past Surgical History:    Past Surgical History:   Procedure Laterality Date      SECTION N/A 2015    Procedure:  SECTION;  Surgeon: Meredith Clark MD;  [facility]: [facility]     IMPLANT STIMULATOR SACRAL NERVE STAGE ONE N/A 3/1/2021    Procedure: INSERTION, SACRAL NERVE STIMULATOR, STAGE 1;  Surgeon: Eyad Hawk MD;  [place] [facility] [place]     IMPLANT STIMULATOR SACRAL NERVE STAGE TWO N/A 3/15/2021    Procedure: INSERTION, SACRAL NERVE STIMULATOR, STAGE 2 Pleaseschedule 2 weeks after stage 1 implant;  Surgeon: Eyad Hawk MD;  [place] [facility] [place] [place]     wisdom teeth             Past Medical History:   Past Medical History:   Diagnosis Date     HSV (herpes simplex virus) infection      Hyperlipidemia      Hypertension        Social History:   Social History     Tobacco Use     Smoking status: Never Smoker     Smokeless tobacco: Former User     Quit date: 2014   Substance Use Topics     Alcohol use: Yes       Family History:   Family History   Problem Relation Age of Onset     Asthma Father      Allergies Father      Lipids Father      Cerebrovascular Disease Father      Hypertension Father      Hyperlipidemia Father      Diabetes Father      Obesity Father       Alcohol/Drug Paternal Grandfather      Substance Abuse Paternal Grandfather      Allergies Mother      Lipids Mother      Thyroid Disease Mother      Other - See Comments Mother         DVT during preg      Hypertension Mother      Hyperlipidemia Mother      Anxiety Disorder Mother      Depression Mother      Obesity Mother      Lipids Maternal Grandmother      Hyperlipidemia Maternal Grandmother      Obesity Maternal Grandmother      Thyroid Disease Maternal Grandfather      Obesity Maternal Grandfather      Cancer No family hx of         no skin cancer       Allergies: No Known Allergies    Active Medications:   Current Outpatient Medications   Medication Sig Dispense Refill     atorvastatin (LIPITOR) 20 MG tablet TAKE ONE TABLET BY MOUTH ONE TIME DAILY  90 tablet 3     enalapril (VASOTEC) 20 MG tablet Take 1 tablet (20 mg) by mouth daily 90 tablet 3     mirabegron (MYRBETRIQ) 50 MG 24 hr tablet Take 1 tablet (50 mg) by mouth daily 90 tablet 3     pramipexole (MIRAPEX) 0.25 MG tablet Take 1-3 tablets by mouth at 1.5-2 hours before bed.  Start with 1 tablet, ok to add additional tablet after 3-4 days if residual symptoms. 90 tablet 11     SUMAtriptan (IMITREX) 50 MG tablet Take 1 tablet (50 mg) by mouth at onset of headache for migraine 12 tablet 4     VITAMIN D, CHOLECALCIFEROL, PO Take 5,000 Units by mouth every other day       bisacodyl (DULCOLAX) 5 MG EC tablet Take as directed. One day before the exam at 4 PM take 2 Dulcolax (Bisacodyl) tablets.  Day of exam at 6 AM take 2 Dulcolax (Bisacodyl) tablets. 4 tablet 0     polyethylene glycol (MIRALAX) 17 GM/Dose powder Take as directed. Day before exam At 4 pm, mix the entire bottle of Miralax with 64 ounces of Gatorade in a pitcher and stir to dissolve the powder. Chill if desired, but do not add ice. At 5 pm, start drinking an 8-ounce glass of the Miralax and Gatorade mixture every 15 minutes until the pitcher is half empty (about 4 glasses).  Store the rest in  the refrigerator. Day of exam at 6 AM start drinking an 8-ounce glass of Miralax and Gatorade mixture every 15 minutes until the pitcher is empty. 238 g 0       Systemic Review:   CONSTITUTIONAL: NEGATIVE for fever, chills, change in weight  ENT/MOUTH: NEGATIVE for ear, mouth and throat problems  RESP: NEGATIVE for significant cough or SOB  CV: NEGATIVE for chest pain, palpitations or peripheral edema    Physical Examination:   Vital Signs: /86 (BP Location: Right arm)   Pulse 92   Temp 97.2  F (36.2  C) (Temporal)   LMP 04/15/2022   GENERAL: healthy, alert and no distress  NECK: no adenopathy, no asymmetry, masses, or scars  RESP: lungs clear to auscultation - no rales, rhonchi or wheezes  CV: regular rate and rhythm, normal S1 S2, no S3 or S4, no murmur, click or rub, no peripheral edema and peripheral pulses strong  ABDOMEN: soft, nontender, no hepatosplenomegaly, no masses and bowel sounds normal  MS: no gross musculoskeletal defects noted, no edema    Plan: Appropriate to proceed as scheduled.      Madelyn Bishop MD  5/23/2022    PCP:  Nadira Colon

## 2022-05-23 NOTE — ANESTHESIA PREPROCEDURE EVALUATION
Anesthesia Pre-Procedure Evaluation    Patient: Adriana Lucia   MRN: [id number] : 1970        Procedure : Procedure(s):  COLONOSCOPY, WITH POLYPECTOMY          Past Medical History:   Diagnosis Date     HSV (herpes simplex virus) infection      Hyperlipidemia      Hypertension       Past Surgical History:   Procedure Laterality Date      SECTION N/A 2015    Procedure:  SECTION;  Surgeon: Meredith Clark MD;  [facility]     IMPLANT STIMULATOR SACRAL NERVE STAGE ONE N/A 3/1/2021    Procedure: INSERTION, SACRAL NERVE STIMULATOR, STAGE 1;  Surgeon: Eyad Hawk MD;  Location: [facility] OR     IMPLANT STIMULATOR SACRAL NERVE STAGE TWO N/A 3/15/2021    Procedure: INSERTION, SACRAL NERVE STIMULATOR, STAGE 2 Pleaseschedule 2 weeks after stage 1 implant;  Surgeon: Eyad Hawk MD;  Location: [facility] OR     wisdom teeth            No Known Allergies   Social History     Tobacco Use     Smoking status: Never Smoker     Smokeless tobacco: Former User     Quit date: 2014   Substance Use Topics     Alcohol use: Yes      Wt Readings from Last 1 Encounters:   22 107.5 kg (237 lb)              OUTSIDE LABS:  CBC:   Lab Results   Component Value Date    WBC 4.5 2019    WBC 6.4 2015    HGB 13.3 2021    HGB 12.1 2019    HCT 36.4 2019    HCT 28.8 (L) 2015     2019     2015     BMP:   Lab Results   Component Value Date     2022     2021    POTASSIUM 4.1 2022    POTASSIUM 4.2 2021    CHLORIDE 110 (H) 2022    CHLORIDE 112 (H) 2021    CO2 22 2022    CO2 24 2021    BUN 18 2022    BUN 18 2021    CR 0.78 2022    CR 0.82 2021    GLC 91 2022     (H) 2021     COAGS: No results found for: PTT, INR, FIBR  POC:   Lab Results   Component Value Date    HCG Negative 2022     HEPATIC:   Lab Results   Component Value  Date    ALT 20 12/06/2017    AST 10 12/06/2017     OTHER:   Lab Results   Component Value Date    DARIELA 9.1 04/18/2022    MAG 4.3 (H) 06/12/2015    TSH 2.42 04/18/2022       Anesthesia Plan    ASA Status:  2      Anesthesia Type: MAC.              Consents            Postoperative Care    Pain management: Multi-modal analgesia.   PONV prophylaxis: Background Propofol Infusion, Ondansetron (or other 5HT-3)     Comments:                Kellen Tejada MD

## 2022-05-23 NOTE — ANESTHESIA CARE TRANSFER NOTE
Patient: Adriana Lucia    Procedure: Procedure(s):  COLONOSCOPY, WITH POLYPECTOMY       Diagnosis: Encounter for colorectal cancer screening [Z12.11, Z12.12]  Diagnosis Additional Information: No value filed.    Anesthesia Type:   No value filed.     Note:    Oropharynx: oropharynx clear of all foreign objects and spontaneously breathing  Level of Consciousness: awake  Oxygen Supplementation: room air    Independent Airway: airway patency satisfactory and stable  Dentition: dentition unchanged  Vital Signs Stable: post-procedure vital signs reviewed and stable  Report to RN Given: handoff report given  Patient transferred to: Phase II    Handoff Report: Identifed the Patient, Identified the Reponsible Provider, Reviewed the pertinent medical history, Discussed the surgical course, Reviewed Intra-OP anesthesia mangement and issues during anesthesia, Set expectations for post-procedure period and Allowed opportunity for questions and acknowledgement of understanding      Vitals:  Vitals Value Taken Time   /72    Temp 36.2  C (97.2  F) 05/23/22 1633   Pulse 86 05/23/22 1633   Resp 14 05/23/22 1633   SpO2 98 % 05/23/22 1633       Electronically Signed By: KANE Mcelroy CRNA  May 23, 2022  4:36 PM

## 2022-05-25 LAB
PATH REPORT.COMMENTS IMP SPEC: NORMAL
PATH REPORT.COMMENTS IMP SPEC: NORMAL
PATH REPORT.FINAL DX SPEC: NORMAL
PATH REPORT.GROSS SPEC: NORMAL
PATH REPORT.MICROSCOPIC SPEC OTHER STN: NORMAL
PATH REPORT.RELEVANT HX SPEC: NORMAL
PHOTO IMAGE: NORMAL

## 2022-05-25 PROCEDURE — 88305 TISSUE EXAM BY PATHOLOGIST: CPT | Mod: 26 | Performed by: PATHOLOGY

## 2022-07-19 ENCOUNTER — APPOINTMENT (OUTPATIENT)
Dept: CT IMAGING | Facility: CLINIC | Age: 52
End: 2022-07-19
Attending: PHYSICIAN ASSISTANT
Payer: COMMERCIAL

## 2022-07-19 ENCOUNTER — HOSPITAL ENCOUNTER (EMERGENCY)
Facility: CLINIC | Age: 52
Discharge: HOME | End: 2022-07-19
Attending: PHYSICIAN ASSISTANT | Admitting: PHYSICIAN ASSISTANT
Payer: COMMERCIAL

## 2022-07-19 VITALS
OXYGEN SATURATION: 97 % | BODY MASS INDEX: 36.73 KG/M2 | SYSTOLIC BLOOD PRESSURE: 127 MMHG | RESPIRATION RATE: 17 BRPM | HEIGHT: 67 IN | TEMPERATURE: 97.4 F | WEIGHT: 234 LBS | HEART RATE: 81 BPM | DIASTOLIC BLOOD PRESSURE: 94 MMHG

## 2022-07-19 DIAGNOSIS — R10.13 ABDOMINAL PAIN, EPIGASTRIC: ICD-10-CM

## 2022-07-19 DIAGNOSIS — K76.9 HEPATIC LESION: ICD-10-CM

## 2022-07-19 DIAGNOSIS — K42.9 UMBILICAL HERNIA WITHOUT OBSTRUCTION AND WITHOUT GANGRENE: ICD-10-CM

## 2022-07-19 LAB
ALBUMIN SERPL-MCNC: 4 G/DL (ref 3.4–5)
ALBUMIN UR-MCNC: NEGATIVE MG/DL
ALP SERPL-CCNC: 49 U/L (ref 40–150)
ALT SERPL W P-5'-P-CCNC: 21 U/L (ref 0–50)
ANION GAP SERPL CALCULATED.3IONS-SCNC: 4 MMOL/L (ref 3–14)
APPEARANCE UR: CLEAR
AST SERPL W P-5'-P-CCNC: 13 U/L (ref 0–45)
BASOPHILS # BLD AUTO: 0 10E3/UL (ref 0–0.2)
BASOPHILS NFR BLD AUTO: 1 %
BILIRUB SERPL-MCNC: 0.7 MG/DL (ref 0.2–1.3)
BILIRUB UR QL STRIP: NEGATIVE
BUN SERPL-MCNC: 11 MG/DL (ref 7–30)
CALCIUM SERPL-MCNC: 9 MG/DL (ref 8.5–10.1)
CHLORIDE BLD-SCNC: 103 MMOL/L (ref 94–109)
CO2 SERPL-SCNC: 28 MMOL/L (ref 20–32)
COLOR UR AUTO: NORMAL
CREAT SERPL-MCNC: 0.73 MG/DL (ref 0.52–1.04)
EOSINOPHIL # BLD AUTO: 0.1 10E3/UL (ref 0–0.7)
EOSINOPHIL NFR BLD AUTO: 2 %
ERYTHROCYTE [DISTWIDTH] IN BLOOD BY AUTOMATED COUNT: 11.9 % (ref 10–15)
GFR SERPL CREATININE-BSD FRML MDRD: >90 ML/MIN/1.73M2
GLUCOSE BLD-MCNC: 100 MG/DL (ref 70–99)
GLUCOSE UR STRIP-MCNC: NEGATIVE MG/DL
HCT VFR BLD AUTO: 40.9 % (ref 35–47)
HGB BLD-MCNC: 14 G/DL (ref 11.7–15.7)
HGB UR QL STRIP: NEGATIVE
IMM GRANULOCYTES # BLD: 0 10E3/UL
IMM GRANULOCYTES NFR BLD: 0 %
KETONES UR STRIP-MCNC: NEGATIVE MG/DL
LEUKOCYTE ESTERASE UR QL STRIP: NEGATIVE
LIPASE SERPL-CCNC: 162 U/L (ref 73–393)
LYMPHOCYTES # BLD AUTO: 1.7 10E3/UL (ref 0.8–5.3)
LYMPHOCYTES NFR BLD AUTO: 31 %
MCH RBC QN AUTO: 31.5 PG (ref 26.5–33)
MCHC RBC AUTO-ENTMCNC: 34.2 G/DL (ref 31.5–36.5)
MCV RBC AUTO: 92 FL (ref 78–100)
MONOCYTES # BLD AUTO: 0.3 10E3/UL (ref 0–1.3)
MONOCYTES NFR BLD AUTO: 6 %
NEUTROPHILS # BLD AUTO: 3.4 10E3/UL (ref 1.6–8.3)
NEUTROPHILS NFR BLD AUTO: 60 %
NITRATE UR QL: NEGATIVE
NRBC # BLD AUTO: 0 10E3/UL
NRBC BLD AUTO-RTO: 0 /100
PH UR STRIP: 5 [PH] (ref 5–7)
PLATELET # BLD AUTO: 255 10E3/UL (ref 150–450)
POTASSIUM BLD-SCNC: 3.5 MMOL/L (ref 3.4–5.3)
PROT SERPL-MCNC: 7.7 G/DL (ref 6.8–8.8)
RBC # BLD AUTO: 4.44 10E6/UL (ref 3.8–5.2)
SODIUM SERPL-SCNC: 135 MMOL/L (ref 133–144)
SP GR UR STRIP: 1.01 (ref 1–1.03)
UROBILINOGEN UR STRIP-MCNC: NORMAL MG/DL
WBC # BLD AUTO: 5.6 10E3/UL (ref 4–11)

## 2022-07-19 PROCEDURE — 80053 COMPREHEN METABOLIC PANEL: CPT | Performed by: PHYSICIAN ASSISTANT

## 2022-07-19 PROCEDURE — 250N000009 HC RX 250: Performed by: PHYSICIAN ASSISTANT

## 2022-07-19 PROCEDURE — 83690 ASSAY OF LIPASE: CPT | Performed by: PHYSICIAN ASSISTANT

## 2022-07-19 PROCEDURE — 74177 CT ABD & PELVIS W/CONTRAST: CPT

## 2022-07-19 PROCEDURE — 82040 ASSAY OF SERUM ALBUMIN: CPT | Performed by: PHYSICIAN ASSISTANT

## 2022-07-19 PROCEDURE — 81003 URINALYSIS AUTO W/O SCOPE: CPT | Performed by: PHYSICIAN ASSISTANT

## 2022-07-19 PROCEDURE — 99285 EMERGENCY DEPT VISIT HI MDM: CPT | Mod: 25

## 2022-07-19 PROCEDURE — 250N000011 HC RX IP 250 OP 636: Performed by: PHYSICIAN ASSISTANT

## 2022-07-19 PROCEDURE — 85025 COMPLETE CBC W/AUTO DIFF WBC: CPT | Performed by: PHYSICIAN ASSISTANT

## 2022-07-19 PROCEDURE — 36415 COLL VENOUS BLD VENIPUNCTURE: CPT | Performed by: PHYSICIAN ASSISTANT

## 2022-07-19 RX ORDER — IOPAMIDOL 755 MG/ML
117 INJECTION, SOLUTION INTRAVASCULAR ONCE
Status: COMPLETED | OUTPATIENT
Start: 2022-07-19 | End: 2022-07-19

## 2022-07-19 RX ADMIN — IOPAMIDOL 117 ML: 755 INJECTION, SOLUTION INTRAVENOUS at 18:25

## 2022-07-19 RX ADMIN — SODIUM CHLORIDE 74 ML: 9 INJECTION, SOLUTION INTRAVENOUS at 18:25

## 2022-07-19 ASSESSMENT — ENCOUNTER SYMPTOMS
DIFFICULTY URINATING: 0
ABDOMINAL PAIN: 1
HEADACHES: 0
ABDOMINAL DISTENTION: 1
FEVER: 0
SORE THROAT: 0

## 2022-07-19 NOTE — ED PROVIDER NOTES
History   Chief Complaint:  Abdominal Pain       The history is provided by the patient.      Adriana Lucia is a 51 year old female with history of hypertension, high cholesterol, and overactive bladder who presents with abdominal pain, ongoing for 3 days. She reports that she had a spicy, cheesy meal when her symptoms started. She endorses swelling about her abdomen and abdominal distension. Her pain is worse with abdomen palpation and laying on her side. She reports that her pain is constant and unlike any other pain she felt before. Patient endorses nausea and vomiting, which has now resolved. She denies fever, headache, cough, sore throat, difficulty urinating, and bowel movement change. She has not yet tried anything for symptom management. Of note, patient reports that she had a similar episode a couple of months ago.     Review of Systems   Constitutional: Negative for fever.   HENT: Negative for sore throat.    Gastrointestinal: Positive for abdominal distention and abdominal pain.   Genitourinary: Negative for difficulty urinating.        (-) bowel movement change    Neurological: Negative for headaches.   All other systems reviewed and are negative.      Allergies:  The patient has no known allergies.     Medications:  Lipitor   Dulcolax  Vasotec  Myrbetriq  Mirapex  Imitrex    Past Medical History:     Hyperlipidemia   High risk pregnancy   Herpes simplex virus infection   Cervical polyp   Vitamin D deficiency   Hypertension   Cherry angioma   Skin cancer screening   Multiple benign nevi  Dermatofibroma   Skin tag  Somatic dysfunction of pelvic region   Morbid obesity   Restless leg syndrome   Overactive bladder    Past Surgical History:     section   Colonoscopy  Insertion sacral nerve stimulator, stage 1  Insertion sacral nerve stimulator, stage 2  [place] teeth removal     Family History:    Asthma   Allergies   Lipids   Cerebrovascular disease   Hypertension   Hyperlipidemia  "  Diabetes  Obesity   Allergies   Thyroid disease   Anxiety disorder  Depression     Social History:  The patient presents to the ED alone.   Arrived by private vehicle.     Physical Exam     Patient Vitals for the past 24 hrs:   BP Temp Temp src Pulse Resp SpO2 Height Weight   07/19/22 1900 (!) 127/94 -- -- 81 -- 97 % -- --   07/19/22 1706 (!) 147/100 97.4  F (36.3  C) Temporal 100 17 97 % 1.702 m (5' 7\") 106.1 kg (234 lb)       Physical Exam    General: Well appearing, pleasant female, resting on exam bed  HEENT: No evidence of trauma.  Conjunctive are clear.  Extraocular eye movements intact.  Neck range of motion intact.  Nose and throat clear.  Respiratory: Good breath sounds bilaterally  Cardiovascular: Normal rate and rhythm   Gastrointestinal: Mass around her umbilicus.  Moderately tender.  Nonreducible.  Musculoskeletal: Atraumatic  Skin: Exposed skin clear.  Neurologic: Alert.  Psych:  Patient is cooperative, with normal affect.    Emergency Department Course     Imaging:  CT Abdomen Pelvis w Contrast   Final Result   IMPRESSION:    1.  Fat-containing periumbilical hernia with mild inflammatory stranding and trace fluid. No bowel herniation.        Report per radiology    Laboratory:  Labs Ordered and Resulted from Time of ED Arrival to Time of ED Departure   COMPREHENSIVE METABOLIC PANEL - Abnormal       Result Value    Sodium 135      Potassium 3.5      Chloride 103      Carbon Dioxide (CO2) 28      Anion Gap 4      Urea Nitrogen 11      Creatinine 0.73      Calcium 9.0      Glucose 100 (*)     Alkaline Phosphatase 49      AST 13      ALT 21      Protein Total 7.7      Albumin 4.0      Bilirubin Total 0.7      GFR Estimate >90     LIPASE - Normal    Lipase 162     URINE MACROSCOPIC WITH REFLEX TO MICRO - Normal    Color Urine Light Yellow      Appearance Urine Clear      Glucose Urine Negative      Bilirubin Urine Negative      Ketones Urine Negative      Specific Gravity Urine 1.011      Blood Urine " Negative      pH Urine 5.0      Protein Albumin Urine Negative      Urobilinogen Urine Normal      Nitrite Urine Negative      Leukocyte Esterase Urine Negative     CBC WITH PLATELETS AND DIFFERENTIAL    WBC Count 5.6      RBC Count 4.44      Hemoglobin 14.0      Hematocrit 40.9      MCV 92      MCH 31.5      MCHC 34.2      RDW 11.9      Platelet Count 255      % Neutrophils 60      % Lymphocytes 31      % Monocytes 6      % Eosinophils 2      % Basophils 1      % Immature Granulocytes 0      NRBCs per 100 WBC 0      Absolute Neutrophils 3.4      Absolute Lymphocytes 1.7      Absolute Monocytes 0.3      Absolute Eosinophils 0.1      Absolute Basophils 0.0      Absolute Immature Granulocytes 0.0      Absolute NRBCs 0.0        Emergency Department Course:        Reviewed:  I reviewed nursing notes, vitals, past medical history and Care Everywhere    Assessments:  1715 I obtained history and examined the patient as noted above.   1903 I rechecked the patient and explained findings. At this point I feel that the patient is safe for discharge, and the patient agrees.     Disposition:  The patient was discharged to home.     Impression & Plan     Medical Decision Making:  Adriana Lucia is a 51 year old female presents emergency room today for evaluation of epigastric pain since Saturday in addition to nausea and vomiting.  See HPI.  Her vitals are unremarkable.  She has a mass around her umbilicus that is not reducible.  It is moderately tender.  CBC, CMP, lipase, UA, unremarkable.  She went for a CT of her abdomen given that I was concerned for possible incarcerated hernia and obstruction.  This thankfully only revealed a fat-containing umbilical hernia.  Reassurance is provided and symptomatic care is indicated.  She is to return with new or worsening symptoms.  Discussed that she could have a intermittent bowel hernia and she will need to come back should symptoms become severe.  Follow-up with primary should  symptoms persist.  Unlikely hepatobiliary, cardiac, pulmonary, or other pathology at this time.  She is comfortable to plan and is discharged home with her mom.  Follow-up with primary regarding hepatic lesion.    Diagnosis:    ICD-10-CM    1. Abdominal pain, epigastric  R10.13    2. Hepatic lesion  K76.9    3. Umbilical hernia without obstruction and without gangrene  K42.9     Fatty hernia     Scribe Disclosure:  I, Billy Cortes, am serving as a scribe at 5:15 PM on 7/19/2022 to document services personally performed by Talib Sandhu PA-C based on my observations and the provider's statements to me.        Talib Sandhu PA-C  07/19/22 2051       Talib Sandhu PA-C  07/19/22 2052

## 2022-07-19 NOTE — ED TRIAGE NOTES
Initially bloating, N/V on Saturday. Now umbilical pain/tenderness and noted umbilicus is slightly reddened and has changed apeearance. Denies fevers/diarrhea.     Triage Assessment     Row Name 07/19/22 1584       Triage Assessment (Adult)    Airway WDL WDL       Respiratory WDL    Respiratory WDL WDL       Skin Circulation/Temperature WDL    Skin Circulation/Temperature WDL WDL       Cardiac WDL    Cardiac WDL WDL       Peripheral/Neurovascular WDL    Peripheral Neurovascular WDL WDL       Cognitive/Neuro/Behavioral WDL    Cognitive/Neuro/Behavioral WDL WDL

## 2022-07-22 ENCOUNTER — OFFICE VISIT (OUTPATIENT)
Dept: INTERNAL MEDICINE | Facility: CLINIC | Age: 52
End: 2022-07-22
Attending: INTERNAL MEDICINE
Payer: COMMERCIAL

## 2022-07-22 VITALS
WEIGHT: 232 LBS | SYSTOLIC BLOOD PRESSURE: 116 MMHG | HEART RATE: 78 BPM | DIASTOLIC BLOOD PRESSURE: 77 MMHG | BODY MASS INDEX: 36.34 KG/M2

## 2022-07-22 DIAGNOSIS — K42.9 UMBILICAL HERNIA WITHOUT OBSTRUCTION AND WITHOUT GANGRENE: Primary | ICD-10-CM

## 2022-07-22 DIAGNOSIS — I10 ESSENTIAL HYPERTENSION, BENIGN: ICD-10-CM

## 2022-07-22 PROCEDURE — G0463 HOSPITAL OUTPT CLINIC VISIT: HCPCS

## 2022-07-22 PROCEDURE — 99213 OFFICE O/P EST LOW 20 MIN: CPT | Performed by: INTERNAL MEDICINE

## 2022-07-22 RX ORDER — ENALAPRIL MALEATE 20 MG/1
20 TABLET ORAL DAILY
Qty: 90 TABLET | Refills: 3 | Status: SHIPPED | OUTPATIENT
Start: 2022-07-22 | End: 2023-04-24

## 2022-07-22 ASSESSMENT — PAIN SCALES - GENERAL: PAINLEVEL: EXTREME PAIN (8)

## 2022-07-22 NOTE — PROGRESS NOTES
Assessment & Plan     Essential hypertension, benign  Reviewed blood pressure readings with patient.  Recommend to continue with current medical therapy without changes.  - enalapril (VASOTEC) 20 MG tablet; Take 1 tablet (20 mg) by mouth daily    Umbilical hernia without obstruction and without gangrene  Patient was advised on results of her CT scan as well as ED evaluation.  Given symptomatic helical hernia, recommend repair.  Surgery referral was placed today.  - Adult General Surg Referral; Future      I spent a total of 20 minutes on the day of the visit.   Time spent doing chart review, history and exam, documentation and further activities per the note           No follow-ups on file.    Nadira Colon MD  [facility] SASHA Wright is a 51 year old, presenting for the following health issues:  Physical (B/P check)      HPI     Patient is here for follow up on ER visit. She reports that she was diagnosed with umbilical hernia.  She reports that abdominal pain has somewhat subsided, however she continues to have an area of irritation and pain around her umbilicus.  She denies nausea, vomiting, diarrhea or constipation.  She is wondering about neck steps in care.    Review of Systems   Constitutional, HEENT, cardiovascular, pulmonary, GI, , musculoskeletal, neuro, skin, endocrine and psych systems are negative, except as otherwise noted.      Objective    /77   Pulse 78   Wt 105.2 kg (232 lb)   LMP 07/11/2022   Breastfeeding No   BMI 36.34 kg/m    Body mass index is 36.34 kg/m .  Physical Exam   GENERAL: healthy, alert and no distress  EYES: Eyes grossly normal to inspection, PERRL and conjunctivae and sclerae normal  RESP: lungs clear to auscultation - no rales, rhonchi or wheezes  CV: regular rate and rhythm, normal S1 S2, no S3 or S4, no murmur, click or rub, no peripheral edema and peripheral pulses strong  ABDOMEN: tenderness periumbilical, an  area of induration is also present, and bowel sounds normal  MS: no gross musculoskeletal defects noted, no edema                    .  ..

## 2022-07-22 NOTE — LETTER
Date:July 25, 2022      Patient was self referred, no letter generated. Do not send.        [facility] Health Information

## 2022-07-22 NOTE — LETTER
7/22/2022       RE: Adriana Lucia  [address] [facility] [address]     Dear Colleague,    Thank you for referring your patient, Adriana Lucia, to the [facility] at [facility]. Please see a copy of my visit note below.      Assessment & Plan     Essential hypertension, benign  Reviewed blood pressure readings with patient.  Recommend to continue with current medical therapy without changes.  - enalapril (VASOTEC) 20 MG tablet; Take 1 tablet (20 mg) by mouth daily    Umbilical hernia without obstruction and without gangrene  Patient was advised on results of her CT scan as well as ED evaluation.  Given symptomatic helical hernia, recommend repair.  Surgery referral was placed today.  - Adult General Surg Referral; Future      I spent a total of 20 minutes on the day of the visit.   Time spent doing chart review, history and exam, documentation and further activities per the note           No follow-ups on file.    Nadira Colon MD  [facility]    Ioana Wright is a 51 year old, presenting for the following health issues:  Physical (B/P check)      HPI     Patient is here for follow up on ER visit. She reports that she was diagnosed with umbilical hernia.  She reports that abdominal pain has somewhat subsided, however she continues to have an area of irritation and pain around her umbilicus.  She denies nausea, vomiting, diarrhea or constipation.  She is wondering about neck steps in care.    Review of Systems   Constitutional, HEENT, cardiovascular, pulmonary, GI, , musculoskeletal, neuro, skin, endocrine and psych systems are negative, except as otherwise noted.      Objective    /77   Pulse 78   Wt 105.2 kg (232 lb)   LMP 07/11/2022   Breastfeeding No   BMI 36.34 kg/m    Body mass index is 36.34 kg/m .  Physical Exam   GENERAL: healthy, alert and no  distress  EYES: Eyes grossly normal to inspection, PERRL and conjunctivae and sclerae normal  RESP: lungs clear to auscultation - no rales, rhonchi or wheezes  CV: regular rate and rhythm, normal S1 S2, no S3 or S4, no murmur, click or rub, no peripheral edema and peripheral pulses strong  ABDOMEN: tenderness periumbilical, an area of induration is also present, and bowel sounds normal  MS: no gross musculoskeletal defects noted, no edema                    .  ..      Again, thank you for allowing me to participate in the care of your patient.      Sincerely,    Nadira Colon MD

## 2022-07-24 DIAGNOSIS — N32.81 OVERACTIVE BLADDER: ICD-10-CM

## 2022-07-24 DIAGNOSIS — N39.41 URGE INCONTINENCE: ICD-10-CM

## 2022-07-25 NOTE — PROGRESS NOTES
"Adriana Lucia is a 51 year old female who is being evaluated via a billable video visit.       The patient has been notified of following:      \"This video visit will be conducted via a call between you and your physician/provider. We have found that certain health care needs can be provided without the need for an in-person physical exam.  This service lets us provide the care you need with a video conversation.  If a prescription is necessary we can send it directly to your pharmacy.  If lab work is needed we can place an order for that and you can then stop by our lab to have the test done at a later time.     Video visits are billed at different rates depending on your insurance coverage.  Please reach out to your insurance provider with any questions.     If during the course of the call the physician/provider feels a video visit is not appropriate, you will not be charged for this service.\"     Patient has given verbal consent for Video visit? Yes  How would you like to obtain your AVS? Mail a copy  If you are dropped from the video visit, the video invite should be resent to: Text to cell phone: -  Will anyone else be joining your video visit? No  If patient encounters technical issues they should call [phone]      Video-Visit Details     Type of service:  Video Visit     Video Start Time: 0805  Video End Time: 0815    Originating Location (pt. Location): Home     Distant Location (provider location):  [facility]      Platform used for Video Visit: Vobi    Virtual visit for annual follow-up of restless leg syndrome managed with pramipexole.     Assessment:  -Restless leg syndrome     Plan:  -Ferritin 73 ng/mL on May 20, 2020  -Prior incomplete response to gabapentin 300-600 mg at bedtime  -Appears to be stable clinically with pramipexole 0.75 mg taken 1-2 hours before bed.  -Plan to continue pramipexole on current dose and plan for follow-up in 1 " "year.    SUBJECTIVE:  Adriana Lucia is a 51 year old female.    Past medical history of restless leg syndrome, hypertension, hyperlipidemia.     10/19/2020 - Office visit and was her first visit with myself.  In summary, incomplete response to gabapentin 300-600 mg at bedtime.  History overall is consistent with restless leg syndrome.  Normal range ferritin of 73 ng/mL on May 20, 2020.  Plan to taper gabapentin and start trial of pramipexole 0.25-0.75 mg taken 1-2 hours before bed.     11/23/2020 - Today, she presents for follow-up of the trial of pramipexole.  She feels it is gone dramatically better and feels it is like a \"night and day difference\".  She has found the pramipexole at 0.5 mg dose has been highly effective and is not aware of any side effects.  She would like to continue the pramipexole and does not desire any changes.     7/16/2021 -she feels she continues to do very well with the pramipexole, no concerns today, feels she is sleeping well and that the restless leg symptoms are being well controlled.  She is having what sounds like some mild intermittent leg cramping, that she attributes to her atorvastatin.  She did do a short trial of discontinuing atorvastatin and did have improvement in the symptoms, but she understands it is important to treat her hyperlipidemia we discussed either restarting or discussed with her primary physician about an alternative statin that may have less risk for muscle symptoms.  A/P to continue pramipexole 0.5 - 0.75mg 1-2 hours before bed.    Today -overall, she feels that her restless legs have been stable and no specific concerns today.  She notes on occasion but medication can seem to take slightly longer to be an effect, but otherwise has been taking the 0.75 mg dose without issue.        Past medical history:    Patient Active Problem List    Diagnosis Date Noted     Overactive bladder 01/28/2021     Priority: Medium     Added automatically from request for " surgery [id number]       Restless legs syndrome (RLS) 2020     Priority: Medium     Morbid obesity (H) 2020     Priority: Medium     Somatic dysfunction of pelvic region 10/17/2018     Priority: Medium     Cherry angioma 2015     Priority: Medium     Skin cancer screening 2015     Priority: Medium     Multiple benign nevi 2015     Priority: Medium     Dermatofibroma 2015     Priority: Medium     Skin tag 2015     Priority: Medium     Chronic hypertension with superimposed preeclampsia 2015     Priority: Medium     S/P  section 2015     Priority: Medium     Chronic hypertension 2015     Priority: Medium     LGA >97th %ile at 36+6 (efw 3871 g) 2015     Priority: Medium     Class: Acute     High-risk pregnancy, AMA 2014     Priority: Medium     Class: Acute     14: Normal NT  Growth US q 3 wks beginning at 28 weeks  Will need weekly BPPs starting at 32 weeks  *please let Nandini Page know when IOL starts/labor.[phone]*       HSV (herpes simplex virus) infection 2014     Priority: Medium     Genital; outbreaks once annually; discussed prophylaxsis starting at 36 weeks.       Cervical polyp 2014     Priority: Medium     Polish removed polyp, no bleeding since       Elevated blood pressure reading without diagnosis of hypertension 2014     Priority: Medium     4/3/15: /84 today.  2.18.15: labetalol daily, stopped aspirin   Agrees to monitor daily; will schedule  appt with Dr Colon  2015:Baseline labs drawn-all nml        Vitamin D deficiency 2014     Priority: Medium     Taking 5,000 iU daily  Problem list name updated by automated process. Provider to review       Hyperlipidemia 2014     Priority: Medium     FH and previous history of  Problem list name updated by automated process. Provider to review         10 point ROS of systems including Constitutional, Eyes, Respiratory, Cardiovascular,  Gastroenterology, Genitourinary, Integumentary, Muscularskeletal, Psychiatric were all negative except for pertinent positives noted in my HPI.    Current Outpatient Medications   Medication Sig Dispense Refill     atorvastatin (LIPITOR) 20 MG tablet TAKE ONE TABLET BY MOUTH ONE TIME DAILY  90 tablet 3     bisacodyl (DULCOLAX) 5 MG EC tablet Take as directed. One day before the exam at 4 PM take 2 Dulcolax (Bisacodyl) tablets.  Day of exam at 6 AM take 2 Dulcolax (Bisacodyl) tablets. 4 tablet 0     enalapril (VASOTEC) 20 MG tablet Take 1 tablet (20 mg) by mouth daily 90 tablet 3     mirabegron (MYRBETRIQ) 50 MG 24 hr tablet Take 1 tablet (50 mg) by mouth daily 90 tablet 3     polyethylene glycol (MIRALAX) 17 GM/Dose powder Take as directed. Day before exam At 4 pm, mix the entire bottle of Miralax with 64 ounces of Gatorade in a pitcher and stir to dissolve the powder. Chill if desired, but do not add ice. At 5 pm, start drinking an 8-ounce glass of the Miralax and Gatorade mixture every 15 minutes until the pitcher is half empty (about 4 glasses).  Store the rest in the refrigerator. Day of exam at 6 AM start drinking an 8-ounce glass of Miralax and Gatorade mixture every 15 minutes until the pitcher is empty. 238 g 0     pramipexole (MIRAPEX) 0.25 MG tablet Take 1-3 tablets by mouth at 1.5-2 hours before bed.  Start with 1 tablet, ok to add additional tablet after 3-4 days if residual symptoms. 90 tablet 11     SUMAtriptan (IMITREX) 50 MG tablet Take 1 tablet (50 mg) by mouth at onset of headache for migraine 12 tablet 4     VITAMIN D, CHOLECALCIFEROL, PO Take 5,000 Units by mouth every other day         OBJECTIVE:  LMP 07/11/2022     Physical Exam     ---  This note was written with the assistance of the Dragon voice-dictation technology software. The final document, although reviewed, may contain errors. For corrections, please contact the office.    Rico Gu MD    Sleep Medicine  [facility]  Pediatric [facility]  - [place], [place]  ([phone])  [facility] - [place], [place]  ([phone])  [facility], [place], [place] ([phone])    Time spent on the date of service:  20 minutes.

## 2022-07-26 ENCOUNTER — VIRTUAL VISIT (OUTPATIENT)
Dept: PULMONOLOGY | Facility: OTHER | Age: 52
End: 2022-07-26
Attending: FAMILY MEDICINE
Payer: COMMERCIAL

## 2022-07-26 VITALS — WEIGHT: 230 LBS | HEIGHT: 67 IN | BODY MASS INDEX: 36.1 KG/M2

## 2022-07-26 DIAGNOSIS — G25.81 RESTLESS LEGS SYNDROME (RLS): ICD-10-CM

## 2022-07-26 PROCEDURE — 99213 OFFICE O/P EST LOW 20 MIN: CPT | Mod: 95 | Performed by: FAMILY MEDICINE

## 2022-07-26 RX ORDER — PRAMIPEXOLE DIHYDROCHLORIDE 0.75 MG/1
TABLET ORAL
Qty: 90 TABLET | Refills: 3 | Status: SHIPPED | OUTPATIENT
Start: 2022-07-26 | End: 2023-03-09

## 2022-07-26 ASSESSMENT — SLEEP AND FATIGUE QUESTIONNAIRES
HOW LIKELY ARE YOU TO NOD OFF OR FALL ASLEEP WHILE SITTING INACTIVE IN A PUBLIC PLACE: WOULD NEVER DOZE
HOW LIKELY ARE YOU TO NOD OFF OR FALL ASLEEP WHILE SITTING AND READING: WOULD NEVER DOZE
HOW LIKELY ARE YOU TO NOD OFF OR FALL ASLEEP WHILE SITTING QUIETLY AFTER LUNCH WITHOUT ALCOHOL: WOULD NEVER DOZE
HOW LIKELY ARE YOU TO NOD OFF OR FALL ASLEEP WHILE SITTING AND TALKING TO SOMEONE: WOULD NEVER DOZE
HOW LIKELY ARE YOU TO NOD OFF OR FALL ASLEEP WHEN YOU ARE A PASSENGER IN A CAR FOR AN HOUR WITHOUT A BREAK: WOULD NEVER DOZE
HOW LIKELY ARE YOU TO NOD OFF OR FALL ASLEEP WHILE LYING DOWN TO REST IN THE AFTERNOON WHEN CIRCUMSTANCES PERMIT: SLIGHT CHANCE OF DOZING
HOW LIKELY ARE YOU TO NOD OFF OR FALL ASLEEP WHILE WATCHING TV: SLIGHT CHANCE OF DOZING
HOW LIKELY ARE YOU TO NOD OFF OR FALL ASLEEP IN A CAR, WHILE STOPPED FOR A FEW MINUTES IN TRAFFIC: WOULD NEVER DOZE

## 2022-07-26 NOTE — PROGRESS NOTES
"Adriana is a 51 year old who is being evaluated via a billable video visit.      How would you like to obtain your AVS? MyChart  If the video visit is dropped, the invitation should be resent by: Text to cell phone: [phone]  Will anyone else be joining your video visit? No  {If patient encounters technical issues they should call [phone] :[phone]}Almita Rutledge        Video-Visit Details    Video Start Time: {video visit start/end time for provider to select:741671}    Type of service:  Video Visit    Video End Time:{video visit start/end time for provider to select:169322}    Originating Location (pt. Location): {video visit patient location:010937::\"Home\"}    Distant Location (provider location):  [facility]     Platform used for Video Visit: {Virtual Visit Platforms:437626::\"AmWell\"}  "

## 2022-07-26 NOTE — TELEPHONE ENCOUNTER
mirabegron (MYRBETRIQ) 50 MG 24 hr tablet  Last Written Prescription Date:   6/29/2021  Last Fill Quantity: 90,   # refills: 3  Last Office Visit :  7/22/2022  Future Office visit:  None    Routing refill request to provider for review/approval because:  Refer to clinic/provider for review and refills per Providers recommendations for Pt care.       Deepti Ceja RN  Central Triage Red Flags/Med Refills

## 2022-07-26 NOTE — TELEPHONE ENCOUNTER
REFERRAL INFORMATION:    Referring Provider:  Dr. Nadira Colon     Referring Clinic:   [facility]     Reason for Visit/Diagnosis: Umbilical hernia        FUTURE VISIT INFORMATION:    Appointment Date: 7/28/2022    Appointment Time: 4 PM      NOTES RECORD STATUS  DETAILS   OFFICE NOTE from Referring Provider Internal 7/22/2022 Office visit with Dr. Nadira Colon     OFFICE NOTE from Other Specialists N/A    HOSPITAL DISCHARGE SUMMARY/ ED VISITS  Internal 7/19/2022 ( [facility])    OPERATIVE REPORT N/A    ENDOSCOPY (EGD)  N/A    PERTINENT LABS Internal    PATHOLOGY REPORTS (RELATED) N/A    IMAGING (CT, MRI, US, XR)  Internal CT Abdomen Pelvis: 7/19/2022

## 2022-07-27 RX ORDER — MIRABEGRON 50 MG/1
50 TABLET, EXTENDED RELEASE ORAL DAILY
Qty: 90 TABLET | Refills: 3 | Status: SHIPPED | OUTPATIENT
Start: 2022-07-27 | End: 2023-06-30

## 2022-07-28 ENCOUNTER — PRE VISIT (OUTPATIENT)
Dept: SURGERY | Facility: CLINIC | Age: 52
End: 2022-07-28

## 2022-07-28 ENCOUNTER — OFFICE VISIT (OUTPATIENT)
Dept: SURGERY | Facility: CLINIC | Age: 52
End: 2022-07-28
Attending: INTERNAL MEDICINE
Payer: COMMERCIAL

## 2022-07-28 VITALS
OXYGEN SATURATION: 98 % | HEIGHT: 67 IN | HEART RATE: 76 BPM | WEIGHT: 236.7 LBS | DIASTOLIC BLOOD PRESSURE: 92 MMHG | SYSTOLIC BLOOD PRESSURE: 134 MMHG | BODY MASS INDEX: 37.15 KG/M2

## 2022-07-28 DIAGNOSIS — K42.9 UMBILICAL HERNIA WITHOUT OBSTRUCTION AND WITHOUT GANGRENE: ICD-10-CM

## 2022-07-28 PROCEDURE — 99203 OFFICE O/P NEW LOW 30 MIN: CPT | Performed by: SURGERY

## 2022-07-28 ASSESSMENT — PAIN SCALES - GENERAL: PAINLEVEL: MODERATE PAIN (5)

## 2022-07-28 NOTE — LETTER
2022       RE: Adriana Lucia  [address] [facility] [place] [address]     Dear Colleague,    Thank you for referring your patient, Adriana Lucia, to the [facility] [place] at [facility]. Please see a copy of my visit note below.    Adriana Lucia is a 51 year old female with a 2 month history of an umbilical hernia with the following symptoms of lump and pain.      Onset did not occur with lifting.  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    Past medical history, medications, allergies, family history, and social history were reviewed with the patient.    Past Medical History:   Diagnosis Date     HSV (herpes simplex virus) infection      Hyperlipidemia      Hypertension      Past Surgical History:   Procedure Laterality Date      SECTION N/A 2015    Procedure:  SECTION;  Surgeon: Meredith Clark MD;  [facility]     COLONOSCOPY N/A 2022    Procedure: COLONOSCOPY, WITH POLYPECTOMY;  Surgeon: Madelyn Bishop MD;  Location: [facility] OR     IMPLANT STIMULATOR SACRAL NERVE STAGE ONE N/A 3/1/2021    Procedure: INSERTION, SACRAL NERVE STIMULATOR, STAGE 1;  Surgeon: Eyad Hawk MD;  Location: [facility] OR     IMPLANT STIMULATOR SACRAL NERVE STAGE TWO N/A 3/15/2021    Procedure: INSERTION, SACRAL NERVE STIMULATOR, STAGE 2 Pleaseschedule 2 weeks after stage 1 implant;  Surgeon: Eyad Hawk MD;  Location: [facility] OR     wisdom teeth             ROS: 10 point review of systems negative except noted in HPI  PHYSICAL EXAM  General appearance- healthy, alert, and in no distress.  Skin- Skin color and turgor normal.  No obvious rashes.  Neck- Neck is supple without obvious adenopathy.  Lungs- Respiratory effort unlabored.  Gait- Normal.  BMI  "37  Abdomen - soft non distended, non tender with umbilical hernia    Impression: umbilical hernia in obese patient.  Recommend weight loss to goal of 200 lbs before schedule elective repair. Watchful waiting safe for now.  Once close to goal can call to plan open mesh repair umbilical hernia.  Plans to call PCP for weight management referral.  \"Total time = 30 minutes, spent on the date of encounter doing chart review, history and physical, documentation, patient education, and any further activity as noted above.         Sincerely,    Rei Louis MD  "

## 2022-07-28 NOTE — NURSING NOTE
"Chief Complaint   Patient presents with     Consult     Umbilical hernia       Vitals:    07/28/22 1601   BP: (!) 134/92   Pulse: 76   SpO2: 98%   Weight: 107.4 kg (236 lb 11.2 oz)   Height: 1.702 m (5' 7\")       Body mass index is 37.07 kg/m .          SHELLIE PUTNAM EMT    "

## 2022-07-28 NOTE — PROGRESS NOTES
Adriana Lucia is a 51 year old female with a 2 month history of an umbilical hernia with the following symptoms of lump and pain.      Onset did not occur with lifting.  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    Past medical history, medications, allergies, family history, and social history were reviewed with the patient.    Past Medical History:   Diagnosis Date     HSV (herpes simplex virus) infection      Hyperlipidemia      Hypertension      Past Surgical History:   Procedure Laterality Date      SECTION N/A 2015    Procedure:  SECTION;  Surgeon: Meredith Clark MD;  [facility]     COLONOSCOPY N/A 2022    Procedure: COLONOSCOPY, WITH POLYPECTOMY;  Surgeon: Madelyn Bishop MD;  Location: [facility] OR     IMPLANT STIMULATOR SACRAL NERVE STAGE ONE N/A 3/1/2021    Procedure: INSERTION, SACRAL NERVE STIMULATOR, STAGE 1;  Surgeon: Eyad Hawk MD;  Location: [facility] OR     IMPLANT STIMULATOR SACRAL NERVE STAGE TWO N/A 3/15/2021    Procedure: INSERTION, SACRAL NERVE STIMULATOR, STAGE 2 Pleaseschedule 2 weeks after stage 1 implant;  Surgeon: Eyad Hawk MD;  Location: [facility] OR     wisdom teeth             ROS: 10 point review of systems negative except noted in HPI  PHYSICAL EXAM  General appearance- healthy, alert, and in no distress.  Skin- Skin color and turgor normal.  No obvious rashes.  Neck- Neck is supple without obvious adenopathy.  Lungs- Respiratory effort unlabored.  Gait- Normal.  BMI 37  Abdomen - soft non distended, non tender with umbilical hernia    Impression: umbilical hernia in obese patient.  Recommend weight loss to goal of 200 lbs before schedule elective repair. Watchful waiting safe for now.  Once close to goal can call to plan open mesh repair umbilical hernia.  Plans to call PCP for weight management  "referral.  \"Total time = 30 minutes, spent on the date of encounter doing chart review, history and physical, documentation, patient education, and any further activity as noted above.         "

## 2022-07-28 NOTE — PATIENT INSTRUCTIONS
You met with Dr. Rei Louis.      Today's visit instructions:    Dr. Louis recommends that you work on weight loss to a goal weight of 200lb prior to be considered for hernia repair. Please contact us at the Nurse Advice line listed below when you have achieved this goal.     Please contact your primary care physician for a referral to a Weight Management program.       If you have questions please contact Imani RN or Maile RN during regular clinic hours, Monday through Friday 7:30 AM - 4:00 PM, or you can contact us via [employer] at anytime.       If you have urgent needs after-hours, weekends, or holidays please call the hospital at [phone] and ask to speak with our on-call General Surgery Team.    Appointment [phone]  Nurse Advice (Imani or Maile): [phone]   Surgery Scheduler ([place]): [phone]  Fax: [phone]

## 2022-09-12 ENCOUNTER — VIRTUAL VISIT (OUTPATIENT)
Dept: ENDOCRINOLOGY | Facility: CLINIC | Age: 52
End: 2022-09-12
Payer: COMMERCIAL

## 2022-09-12 VITALS — BODY MASS INDEX: 36.88 KG/M2 | HEIGHT: 67 IN | WEIGHT: 235 LBS

## 2022-09-12 DIAGNOSIS — I10 ESSENTIAL HYPERTENSION, BENIGN: ICD-10-CM

## 2022-09-12 DIAGNOSIS — E66.812 OBESITY, CLASS II, BMI 35-39.9: ICD-10-CM

## 2022-09-12 DIAGNOSIS — K42.9 UMBILICAL HERNIA WITHOUT OBSTRUCTION AND WITHOUT GANGRENE: ICD-10-CM

## 2022-09-12 DIAGNOSIS — Z71.3 NUTRITIONAL COUNSELING: Primary | ICD-10-CM

## 2022-09-12 PROCEDURE — 99204 OFFICE O/P NEW MOD 45 MIN: CPT | Mod: GT | Performed by: INTERNAL MEDICINE

## 2022-09-12 PROCEDURE — 97802 MEDICAL NUTRITION INDIV IN: CPT | Mod: GT | Performed by: DIETITIAN, REGISTERED

## 2022-09-12 ASSESSMENT — PAIN SCALES - GENERAL: PAINLEVEL: NO PAIN (0)

## 2022-09-12 NOTE — LETTER
"9/12/2022       RE: Adriana Lucia  [address] [facility] [place] [address]     Dear Colleague,    Thank you for referring your patient, Adriana Lucia, to the [facility] [place] [facility]. Please see a copy of my visit note below.    Adriana Lucia is a 52 year old female who is being evaluated via a billable video visit.      The patient has been notified of following:     \"This video visit will be conducted via a call between you and your physician/provider. We have found that certain health care needs can be provided without the need for an in-person physical exam.  This service lets us provide the care you need with a video conversation.  If a prescription is necessary we can send it directly to your pharmacy.  If lab work is needed we can place an order for that and you can then stop by our lab to have the test done at a later time.    Video visits are billed at different rates depending on your insurance coverage.  Please reach out to your insurance provider with any questions.    If during the course of the call the physician/provider feels a video visit is not appropriate, you will not be charged for this service.\"    Patient has given verbal consent for Video visit? Yes  How would you like to obtain your AVS? MyChart  If you are dropped from the video visit, the video invite should be resent to: Text to cell phone: [phone]  Will anyone else be joining your video visit? No  {If patient encounters technical issues they should call [phone]      Video-Visit Details    Type of service:  Video Visit    Video Start Time: 10:57 am   Video End Time: 11:30 am    Originating Location (pt. Location): Home    Distant Location (provider location):  [facility] [place]     Platform used for Video Visit: Zhui Xin    During this virtual visit the patient is located in [place], " "patient verifies this as the location during the entirety of this visit.     New Weight Management Nutrition Consultation    Adriana Lucia is a 52 year old female presents today for new weight management nutrition consultation.    Patient referred by Dr. Spivey on September 12, 2022.    Patient with Co-morbidities of obesity including:  Type II DM no  Renal Failure no  Sleep apnea no  Hypertension yes  Dyslipidemia no  Joint pain no  Back pain no  GERD no       PMH:   pt is needing 36 lb weight loss for umbilical hernia repair     Anthropometrics:  Weight 9/12/22: 235 lbs with BMI 36.81    Estimated body mass index is 36.81 kg/m  as calculated from the following:    Height as of an earlier encounter on 9/12/22: 1.702 m (5' 7\").    Weight as of an earlier encounter on 9/12/22: 106.6 kg (235 lb).     Goal weight for surgery: 199 lbs    Medications for Weight Loss:  None    Supplements:     Vit D 5000 Ius/day     Hx of Vit D def    NUTRITION HISTORY  NKFA  Lactose intolerant - eats cheese, takes lactaid but still has some issues. Yogurt (no issues). Occ lactose free milk    Has never worked with a RD before. Has done [facility] in past.  Patient is needing weight loss for a hernia repair.  Pt goals: support/guidance, lose weight     Good support system at home     Typical day   B - wheat toast, pb, coffee OR protein bar  L - varies - hard to eat at work. Might be protein bar OR none OR sandwich   D - protein (usually fatter options per pt), salad, vegetable, bread   Snack - potato chip     Fluids: water, 1 cup coffee/day, alcohol (vodka with club soda or unsweetened lemonade) on weekends or occ during week    - Tries to stay hydrated, has overactive bladder so limits caffeine     If having pasta she will do zucchini noodles.   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  Main veggies in house: leafy greens, cauliflower, broccoli   Cooks with olive oil and uses on vegetables as well. Occ " uses butter.     PA: tries to walk dog 2x/day    Patient shared via chat today  I do have a therapist but my mom is in the other room so Im not alone to share as much right now.     Additional information:  PT Pharmacy Tech at [email or web address]    Lives with spouse and child (7 year old son)   Mom sometimes stays with them     Nutrition Prescription  Recommended energy/nutrient modification.  1500 calories/day (per MD)    Nutrition Diagnosis  Food and utrition related knowledge deficit r/t lack of prior exposure to nutrition education aeb pt report and interest in education     Obesity r/t  positive energy balance aeb BMI >30.    Nutrition Intervention  Reviewed current dietary habits and pts history   Discussed long-term goals pt hopes to accomplish in RD appointments  Answered pt questions  Coordination of care   Nutrition education   - Plate Method    - Starchy vs. Non-starchy Vegetables    - Mediterranean diet    - Saturated vs. Unsaturated fats    - Sodium   AVS and handouts via [employer]    Patient demonstrates understanding.    Expected Engagement: good    Nutrition Goals    1. Look into cookbooks   2. Aim to follow plate method at lunch/dinner  3. Continue being primary    4. Calcium needs: 1200 mg/day from all sources     Resources:     Starchy vegetables:   ? Corn  ? Potatoes   ? Green peas  ? Beans/lentils     Protein ideas:  ? Shrimp  ? Turkey  ? Chicken   ? Fish   ? Eggs  ? Cottage cheese (if able to tolerate)   ? Beans/lentils     Types of fats  https://www.[facility].[employer].edu/nutritionsource/what-should-you-eat/fats-and-cholesterol/types-of-fat/    Tips for lowering sodium content of meals  http://www.[employer]Hubs.YoQueVos/394004.pdf      Mediterranean Diet Resources    https://www.health.[employer].edu/staying-healthy/foods-that-fight-inflammation     https://www.heart.org/en/healthy-living/healthy-eating/eat-smart/nutrition-basics/mediterranean-diet     https://oldwayspt.org/traditional-diets/mediterranean-diet      https://my.[facility].org/health/articles/16037-mediterranean-diet       Calcium sources:     ? Seeds (poppy, sesame, celery, sabrina)  ? Cheese (recommend low-fat or skim)  ? Yogurt  ? Cottage cheese  ? Beans/lentils   ? Almonds  ? Milk  ? Whey protein   ? Leafy greens   ? Edamame and tofu  ? Figs    The Plate Method:  https://www.cdc.gov/diabetes/images/managing/Diabetes-Manage-Eat-Well-Plate-Graphic_600px.jpg     Summary of Volumetrics Eating Plan  http://fvfiles.com/938773.pdf     Follow-Up:  Monday, October 17th at 1:00 pm    Time spent with patient: 33 minutes.  GAMAL Lafleur, RD, LD

## 2022-09-12 NOTE — LETTER
2022       RE: Adriana Lucia  [address] [facility] [place] [address]     Dear Colleague,    Thank you for referring your patient, Adriana Lucia, to the [facility] [place] at [facility]. Please see a copy of my visit note below.    Adriana is a 52 year old who is being evaluated via a billable video visit.      How would you like to obtain your AVS? MyChart  If the video visit is dropped, the invitation should be resent by: Text to cell phone: [phone]  Will anyone else be joining your video visit? No    Video-Visit Details    Video Start Time: 10:01    Type of service:  Video Visit    Video End Time:10:24 AM    Originating Location (pt. Location): Home    Distant Location (provider location):  [facility] [place]     Platform used for Video Visit: [employer]        [facility] Weight Management Consult    PATIENT:  Adriana Lucia  MRN:         [id number]  :         1970  SIMA:         2022    Dear Nadira Colon MD,    I had the pleasure of seeing your patient, Adriana Lucia.  Full intake/assessment done to determine barriers to weight loss success and develop a treatment plan.  Adriana Lucia is a 52 year old female interested in treatment of medical problems associated with weight.  Her weight today is 235 lbs 0 oz, Body mass index is 36.81 kg/m ., and she has the following co-morbidities:     2022   I have the following health issues associated with obesity: High Blood Pressure, High Cholesterol   I have the following symptoms associated with obesity: None of the above       Patient Goals 2022   I am interested in having a healthier weight to diminish current health problems: Yes   I am interested in having a healthier weight in order to prevent future health problems: Yes   I am interested in having a healthier weight in order to have  "a future surgery: Yes       Referring Provider 9/8/2022   Please name the provider who referred you to Medical Weight Management.  If you do not know, please answer: \"I Don't Know\". Nadira Colon       Wt Readings from Last 4 Encounters:   09/12/22 106.6 kg (235 lb)   07/28/22 107.4 kg (236 lb 11.2 oz)   07/26/22 104.3 kg (230 lb)   07/22/22 105.2 kg (232 lb)       Weight History 9/8/2022   How concerned are you about your weight? Very Concerned   Would you describe your weight gain as gradual? Yes   I became overweight: As an Adult   The following factors have contributed to my weight gain:  Eating Too Much, Lack of Exercise, Genetic (Runs in the Family)   I have tried the following methods to lose weight: Watching Portions or Calories, Weight Watchers, Fasting   My lowest weight since age 19 was: 190   My highest weight since age 19 was: 238   The most weight I have ever lost was: (lbs) 15   I have the following family history of obesity/being overweight:  My mother is overweight, One or more of my siblings are overweight   Has anyone in your family had weight loss surgery? No   How has your weight changed over the last year?  Gained       Diet Recall 9/8/2022   Glass juice/day 0   Glass milk/day 0   Glass sugary drink/day 0   How many cans/bottles of sugar pop/soda/tea/sports drinks do you drink in a day? 0   Diet drink/day 0   Alcohol 2-4 Times a Month   Drinks/day 3-4       Eating Habits 9/8/2022   Generally, my meals include foods like these: bread, pasta, rice, potatoes, corn, crackers, sweet dessert, pop, or juice. Almost Everyday   Generally, my meals include foods like these: fried meats, brats, burgers, french fries, pizza, cheese, chips, or ice cream. A Few Times a Week   Eat fast food (like [employer], [employer], [employer]aco Bell). Less Than Weekly   Eat at a buffet or sit-down restaurant. Less Than Weekly   Eat most of my meals in front of the TV or computer. Almost Everyday   Often skip meals, eat at random " times, have no regular eating times. Less Than Weekly   Rarely sit down for a meal but snack or graze throughout.  Less Than Weekly   Eat extra snacks between meals. Less Than Weekly   Eat most of my food at the end of the day. Never   Eat in the middle of the night or wake up at night to eat. Never   Eat extra snacks to prevent or correct low blood sugar. Never   Eat to prevent acid reflux or stomach pain. Never   Worry about not having enough food to eat. Never   Have you been to the food shelf at least a few times this year? No   I eat when I am depressed. Less Than Weekly   I eat when I am stressed. Less Than Weekly   I eat when I am bored. Never   I eat when I am anxious. Never   I eat when I am happy or as a reward. Less Than Weekly   I feel hungry all the time even if I just have eaten. Never   Feeling full is important to me. Never   I finish all the food on my plate even if I am already full. Less Than Weekly   I can't resist eating delicious food or walk past the good food/smell. Never   I eat/snack without noticing that I am eating. Never   I eat when I am preparing the meal. Less Than Weekly   I eat more than usual when I see others eating. Never   I have trouble not eating sweets, ice cream, cookies, or chips if they are around the house. Never   I think about food all day. Never   What foods, if any, do you crave? None       Activity/Exercise History 9/8/2022   How much of a typical 12 hour day do you spend sitting? Less Than Half the Day   How much of a typical 12 hour day do you spend lying down? Less Than Half the Day   How much of a typical day do you spend walking/standing? Most of the Day   How many hours (not including work) do you spend on the TV/Video Games/Computer/Tablet/Phone? 6 Hours or More   How many times a week are you active for the purpose of exercise? 4-5 TImes a Week   What keeps you from being more active? Lack of Time, Too tired   How many total minutes do you spend doing some  activity for the purpose of exercising when you exercise? 15-30 Minutes       PAST MEDICAL HISTORY:  Past Medical History:   Diagnosis Date     HSV (herpes simplex virus) infection      Hyperlipidemia      Hypertension        Work/Social History Reviewed With Patient 9/8/2022   My employment status is: Part-Time   My job is: Pharmacy technician   How much of your job is spent on the computer or phone? 75%   How many hours do you spend commuting to work daily?  1   What is your marital status? /In a Relationship   If in a relationship, is your significant other overweight? No   Do you have children? Yes   If you have children, are they overweight? No   Who do you live with?  Child and spouse   Are they supportive of your health goals? Yes   Who does the food shopping?  Myself and spouse       Mental Health History Reviewed With Patient 9/8/2022   Have you ever been physically or sexually abused? No   If yes, do you feel that the abuse is affecting your weight? N/A   If yes, would you like to talk to a counselor about the abuse? N/A   How often in the past 2 weeks have you felt little interest or pleasure in doing things? Not at all   Over the past 2 weeks how often have you felt down, depressed, or hopeless? Not at all       Sleep History Reviewed With Patient 9/8/2022   How many hours do you sleep at night? 7   Do you think that you snore loudly or has anybody ever heard you snore loudly (louder than talking or so loud it can be heard behind a shut door)? No   Has anyone seen or heard you stop breathing during your sleep? No   Do you often feel tired, fatigued, or sleepy during the day? Yes   Do you have a TV/Computer in your bedroom? Yes       MEDICATIONS:   Current Outpatient Medications   Medication Sig Dispense Refill     atorvastatin (LIPITOR) 20 MG tablet TAKE ONE TABLET BY MOUTH ONE TIME DAILY  90 tablet 3     bisacodyl (DULCOLAX) 5 MG EC tablet Take as directed. One day before the exam at 4 PM take 2  "Dulcolax (Bisacodyl) tablets.  Day of exam at 6 AM take 2 Dulcolax (Bisacodyl) tablets. 4 tablet 0     enalapril (VASOTEC) 20 MG tablet Take 1 tablet (20 mg) by mouth daily 90 tablet 3     mirabegron (MYRBETRIQ) 50 MG 24 hr tablet Take 1 tablet (50 mg) by mouth daily 90 tablet 3     polyethylene glycol (MIRALAX) 17 GM/Dose powder Take as directed. Day before exam At 4 pm, mix the entire bottle of Miralax with 64 ounces of Gatorade in a pitcher and stir to dissolve the powder. Chill if desired, but do not add ice. At 5 pm, start drinking an 8-ounce glass of the Miralax and Gatorade mixture every 15 minutes until the pitcher is half empty (about 4 glasses).  Store the rest in the refrigerator. Day of exam at 6 AM start drinking an 8-ounce glass of Miralax and Gatorade mixture every 15 minutes until the pitcher is empty. 238 g 0     pramipexole (MIRAPEX) 0.75 MG tablet Take 1 tablet by mouth at 1.5-2 hours before bed. 90 tablet 3     SUMAtriptan (IMITREX) 50 MG tablet Take 1 tablet (50 mg) by mouth at onset of headache for migraine 12 tablet 4     VITAMIN D, CHOLECALCIFEROL, PO Take 5,000 Units by mouth every other day       ALLERGIES:   No Known Allergies    PHYSICAL EXAM:  Ht 1.702 m (5' 7\")   Wt 106.6 kg (235 lb)   BMI 36.81 kg/m     A & O x 3  HEENT: NCAT, mucous membranes moist  Respirations unlabored  Location of obesity: Mixed Obesity    ASSESSMENT:  Adriana is a patient with mature onset morbid obesity with significant element of familial/genetic influence and with current health consequences. She does need aggressive weight loss plan due to request for 36 lb weight loss to have umbilical hernia surgery. Has tried various diets with transient success.     Adriana Lucia eats a high carb diet and mostly eats during the evening. Since hearing about surgery has been \"watching\" and hoping to exercise.    Her problem is complicated by strong craving/reward pathways and gender and short stature    Her ability to " lose weight is impacted by functional impairment and lack of confidence.    PLAN:    Dietician visit of education  Volumetrics eating plan - Mediterranean diet  Meal planning    Craving/Reward   Ancillary testing:  N/A.  Food Plan:  Volumetrics and High protein/low carbohydrate.   Activity Plan:  Activity journal.  Supplementary:  N/A.   Medication:  Deferred    RTC:    12 weeks.    Sincerely,  Warren Spivey MD

## 2022-09-12 NOTE — PROGRESS NOTES
"    New Medical Weight Management Consult    PATIENT:  Adriana Lucia  MRN:         [id number]  :         1970  SIMA:         2022    Dear Nadira Colon MD,    I had the pleasure of seeing your patient, Adriana Lucia.  Full intake/assessment done to determine barriers to weight loss success and develop a treatment plan.  Adriana Lucia is a 52 year old female interested in treatment of medical problems associated with weight.  Her weight today is 235 lbs 0 oz, Body mass index is 36.81 kg/m ., and she has the following co-morbidities:     2022   I have the following health issues associated with obesity: High Blood Pressure, High Cholesterol   I have the following symptoms associated with obesity: None of the above       Patient Goals 2022   I am interested in having a healthier weight to diminish current health problems: Yes   I am interested in having a healthier weight in order to prevent future health problems: Yes   I am interested in having a healthier weight in order to have a future surgery: Yes       Referring Provider 2022   Please name the provider who referred you to Medical Weight Management.  If you do not know, please answer: \"I Don't Know\". Nadira Colon       Wt Readings from Last 4 Encounters:   22 106.6 kg (235 lb)   22 107.4 kg (236 lb 11.2 oz)   22 104.3 kg (230 lb)   22 105.2 kg (232 lb)       Weight History 2022   How concerned are you about your weight? Very Concerned   Would you describe your weight gain as gradual? Yes   I became overweight: As an Adult   The following factors have contributed to my weight gain:  Eating Too Much, Lack of Exercise, Genetic (Runs in the Family)   I have tried the following methods to lose weight: Watching Portions or Calories, Weight Watchers, Fasting   My lowest weight since age 19 was: 190   My highest weight since age 19 was: 238   The most weight I have ever lost was: (lbs) 15   I " have the following family history of obesity/being overweight:  My mother is overweight, One or more of my siblings are overweight   Has anyone in your family had weight loss surgery? No   How has your weight changed over the last year?  Gained       Diet Recall 9/8/2022   Glass juice/day 0   Glass milk/day 0   Glass sugary drink/day 0   How many cans/bottles of sugar pop/soda/tea/sports drinks do you drink in a day? 0   Diet drink/day 0   Alcohol 2-4 Times a Month   Drinks/day 3-4       Eating Habits 9/8/2022   Generally, my meals include foods like these: bread, pasta, rice, potatoes, corn, crackers, sweet dessert, pop, or juice. Almost Everyday   Generally, my meals include foods like these: fried meats, brats, burgers, french fries, pizza, cheese, chips, or ice cream. A Few Times a Week   Eat fast food (like McDonal[employer], [employer], Taco Bell). Less Than Weekly   Eat at a buffet or sit-down restaurant. Less Than Weekly   Eat most of my meals in front of the TV or computer. Almost Everyday   Often skip meals, eat at random times, have no regular eating times. Less Than Weekly   Rarely sit down for a meal but snack or graze throughout.  Less Than Weekly   Eat extra snacks between meals. Less Than Weekly   Eat most of my food at the end of the day. Never   Eat in the middle of the night or wake up at night to eat. Never   Eat extra snacks to prevent or correct low blood sugar. Never   Eat to prevent acid reflux or stomach pain. Never   Worry about not having enough food to eat. Never   Have you been to the food shelf at least a few times this year? No   I eat when I am depressed. Less Than Weekly   I eat when I am stressed. Less Than Weekly   I eat when I am bored. Never   I eat when I am anxious. Never   I eat when I am happy or as a reward. Less Than Weekly   I feel hungry all the time even if I just have eaten. Never   Feeling full is important to me. Never   I finish all the food on my plate even if I am already  full. Less Than Weekly   I can't resist eating delicious food or walk past the good food/smell. Never   I eat/snack without noticing that I am eating. Never   I eat when I am preparing the meal. Less Than Weekly   I eat more than usual when I see others eating. Never   I have trouble not eating sweets, ice cream, cookies, or chips if they are around the house. Never   I think about food all day. Never   What foods, if any, do you crave? None       Activity/Exercise History 9/8/2022   How much of a typical 12 hour day do you spend sitting? Less Than Half the Day   How much of a typical 12 hour day do you spend lying down? Less Than Half the Day   How much of a typical day do you spend walking/standing? Most of the Day   How many hours (not including work) do you spend on the TV/Video Games/Computer/Tablet/Phone? 6 Hours or More   How many times a week are you active for the purpose of exercise? 4-5 TImes a Week   What keeps you from being more active? Lack of Time, Too tired   How many total minutes do you spend doing some activity for the purpose of exercising when you exercise? 15-30 Minutes       PAST MEDICAL HISTORY:  Past Medical History:   Diagnosis Date     HSV (herpes simplex virus) infection      Hyperlipidemia      Hypertension        Work/Social History Reviewed With Patient 9/8/2022   My employment status is: Part-Time   My job is: Pharmacy technician   How much of your job is spent on the computer or phone? 75%   How many hours do you spend commuting to work daily?  1   What is your marital status? /In a Relationship   If in a relationship, is your significant other overweight? No   Do you have children? Yes   If you have children, are they overweight? No   Who do you live with?  Child and spouse   Are they supportive of your health goals? Yes   Who does the food shopping?  Myself and spouse       Mental Health History Reviewed With Patient 9/8/2022   Have you ever been physically or sexually  abused? No   If yes, do you feel that the abuse is affecting your weight? N/A   If yes, would you like to talk to a counselor about the abuse? N/A   How often in the past 2 weeks have you felt little interest or pleasure in doing things? Not at all   Over the past 2 weeks how often have you felt down, depressed, or hopeless? Not at all       Sleep History Reviewed With Patient 9/8/2022   How many hours do you sleep at night? 7   Do you think that you snore loudly or has anybody ever heard you snore loudly (louder than talking or so loud it can be heard behind a shut door)? No   Has anyone seen or heard you stop breathing during your sleep? No   Do you often feel tired, fatigued, or sleepy during the day? Yes   Do you have a TV/Computer in your bedroom? Yes       MEDICATIONS:   Current Outpatient Medications   Medication Sig Dispense Refill     atorvastatin (LIPITOR) 20 MG tablet TAKE ONE TABLET BY MOUTH ONE TIME DAILY  90 tablet 3     bisacodyl (DULCOLAX) 5 MG EC tablet Take as directed. One day before the exam at 4 PM take 2 Dulcolax (Bisacodyl) tablets.  Day of exam at 6 AM take 2 Dulcolax (Bisacodyl) tablets. 4 tablet 0     enalapril (VASOTEC) 20 MG tablet Take 1 tablet (20 mg) by mouth daily 90 tablet 3     mirabegron (MYRBETRIQ) 50 MG 24 hr tablet Take 1 tablet (50 mg) by mouth daily 90 tablet 3     polyethylene glycol (MIRALAX) 17 GM/Dose powder Take as directed. Day before exam At 4 pm, mix the entire bottle of Miralax with 64 ounces of Gatorade in a pitcher and stir to dissolve the powder. Chill if desired, but do not add ice. At 5 pm, start drinking an 8-ounce glass of the Miralax and Gatorade mixture every 15 minutes until the pitcher is half empty (about 4 glasses).  Store the rest in the refrigerator. Day of exam at 6 AM start drinking an 8-ounce glass of Miralax and Gatorade mixture every 15 minutes until the pitcher is empty. 238 g 0     pramipexole (MIRAPEX) 0.75 MG tablet Take 1 tablet by mouth at  "1.5-2 hours before bed. 90 tablet 3     SUMAtriptan (IMITREX) 50 MG tablet Take 1 tablet (50 mg) by mouth at onset of headache for migraine 12 tablet 4     VITAMIN D, CHOLECALCIFEROL, PO Take 5,000 Units by mouth every other day       ALLERGIES:   No Known Allergies    PHYSICAL EXAM:  Ht 1.702 m (5' 7\")   Wt 106.6 kg (235 lb)   BMI 36.81 kg/m     A & O x 3  HEENT: NCAT, mucous membranes moist  Respirations unlabored  Location of obesity: Mixed Obesity    ASSESSMENT:  Adriana is a patient with mature onset morbid obesity with significant element of familial/genetic influence and with current health consequences. She does need aggressive weight loss plan due to request for 36 lb weight loss to have umbilical hernia surgery. Has tried various diets with transient success.     Adriana Lucia eats a high carb diet and mostly eats during the evening. Since hearing about surgery has been \"watching\" and hoping to exercise.    Her problem is complicated by strong craving/reward pathways and gender and short stature    Her ability to lose weight is impacted by functional impairment and lack of confidence.    PLAN:    Dietician visit of education  Volumetrics eating plan - Mediterranean diet  Meal planning    Craving/Reward   Ancillary testing:  N/A.  Food Plan:  Volumetrics and High protein/low carbohydrate.   Activity Plan:  Activity journal.  Supplementary:  N/A.   Medication:  Deferred    RTC:    12 weeks.    Sincerely,  Warren Spivey MD      "

## 2022-09-12 NOTE — PROGRESS NOTES
"Adriana Lucia is a 52 year old female who is being evaluated via a billable video visit.      The patient has been notified of following:     \"This video visit will be conducted via a call between you and your physician/provider. We have found that certain health care needs can be provided without the need for an in-person physical exam.  This service lets us provide the care you need with a video conversation.  If a prescription is necessary we can send it directly to your pharmacy.  If lab work is needed we can place an order for that and you can then stop by our lab to have the test done at a later time.    Video visits are billed at different rates depending on your insurance coverage.  Please reach out to your insurance provider with any questions.    If during the course of the call the physician/provider feels a video visit is not appropriate, you will not be charged for this service.\"    Patient has given verbal consent for Video visit? Yes  How would you like to obtain your AVS? MyChart  If you are dropped from the video visit, the video invite should be resent to: Text to cell phone: [phone]  Will anyone else be joining your video visit? No  {If patient encounters technical issues they should call [phone]      Video-Visit Details    Type of service:  Video Visit    Video Start Time: 10:57 am   Video End Time: 11:30 am    Originating Location (pt. Location): Home    Distant Location (provider location):  [facility]NA[facility] [place]     Platform used for Video Visit: [employer]    During this virtual visit the patient is located in [place], patient verifies this as the location during the entirety of this visit.     New Weight Management Nutrition Consultation    Adriana Lucia is a 52 year old female presents today for new weight management nutrition consultation.    Patient referred by Dr. Spivey on September 12, 2022.    Patient with Co-morbidities of obesity " "including:  Type II DM no  Renal Failure no  Sleep apnea no  Hypertension yes  Dyslipidemia no  Joint pain no  Back pain no  GERD no       PMH:   pt is needing 36 lb weight loss for umbilical hernia repair     Anthropometrics:  Weight 9/12/22: 235 lbs with BMI 36.81    Estimated body mass index is 36.81 kg/m  as calculated from the following:    Height as of an earlier encounter on 9/12/22: 1.702 m (5' 7\").    Weight as of an earlier encounter on 9/12/22: 106.6 kg (235 lb).     Goal weight for surgery: 199 lbs    Medications for Weight Loss:  None    Supplements:     Vit D 5000 Ius/day     Hx of Vit D def    NUTRITION HISTORY  NKFA  Lactose intolerant - eats cheese, takes lactaid but still has some issues. Yogurt (no issues). Occ lactose free milk    Has never worked with a RD before. Has done WW in past.  Patient is needing weight loss for a hernia repair.  Pt goals: support/guidance, lose weight     Good support system at home     Typical day   B - wheat toast, pb, coffee OR protein bar  L - varies - hard to eat at work. Might be protein bar OR none OR sandwich   D - protein (usually fatter options per pt), salad, vegetable, bread   Snack - potato chip     Fluids: water, 1 cup coffee/day, alcohol (vodka with club soda or unsweetened lemonade) on weekends or occ during week    - Tries to stay hydrated, has overactive bladder so limits caffeine     If having pasta she will do zucchini noodles.   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  Main veggies in house: leafy greens, cauliflower, broccoli   Cooks with olive oil and uses on vegetables as well. Occ uses butter.     PA: tries to walk dog 2x/day    Patient shared via chat today  I do have a therapist but my mom is in the other room so Im not alone to share as much right now.     Additional information:  PT Pharmacy Tech at [employer]    Lives with spouse and child (7 year old son)   Mom sometimes stays with them     Nutrition " Prescription  Recommended energy/nutrient modification.  1500 calories/day (per MD)    Nutrition Diagnosis  Food and utrition related knowledge deficit r/t lack of prior exposure to nutrition education aeb pt report and interest in education     Obesity r/t  positive energy balance aeb BMI >30.    Nutrition Intervention  Reviewed current dietary habits and pts history   Discussed long-term goals pt hopes to accomplish in RD appointments  Answered pt questions  Coordination of care   Nutrition education   - Plate Method    - Starchy vs. Non-starchy Vegetables    - Mediterranean diet    - Saturated vs. Unsaturated fats    - Sodium   AVS and handouts via [employer]    Patient demonstrates understanding.    Expected Engagement: good    Nutrition Goals    1. Look into cookbooks   2. Aim to follow plate method at lunch/dinner  3. Continue being primary    4. Calcium needs: 1200 mg/day from all sources     Resources:     Starchy vegetables:   ? Corn  ? Potatoes   ? Green peas  ? Beans/lentils     Protein ideas:  ? Shrimp  ? Turkey  ? Chicken   ? Fish   ? Eggs  ? Cottage cheese (if able to tolerate)   ? Beans/lentils     Types of fats  https://www.[facility].[place].edu/nutritionsource/what-should-you-eat/fats-and-cholesterol/types-of-fat/    Tips for lowering sodium content of meals  http://[email or web address]      Mediterranean Diet Resources    https://www.health.harvard.edu/staying-healthy/foods-that-fight-inflammation     https://www.heart.org/en/healthy-living/healthy-eating/eat-smart/nutrition-basics/mediterranean-diet     https://oldLettuceThinnerpt.org/traditional-diets/mediterranean-diet     https://my.[facility].org/health/articles/16037-mediterranean-diet       Calcium sources:     ? Seeds (poppy, sesame, celery, sabrina)  ? Cheese (recommend low-fat or skim)  ? Yogurt  ? Cottage cheese  ? Beans/lentils   ? Almonds  ? Milk  ? Whey protein   ? Leafy greens   ? Edamame and tofu  ? Figs    The Plate  Method:  https://www.cdc.gov/diabetes/images/managing/Diabetes-Manage-Eat-Well-Plate-Graphic_600px.jpg     Summary of Volumetrics Eating Plan  [email or web address]     Follow-Up:  Monday, October 17th at 1:00 pm    Time spent with patient: 33 minutes.  GAMAL Lafleur, RD, LD

## 2022-09-12 NOTE — NURSING NOTE
"Chief Complaint   Patient presents with     Consult     [facility]       Vitals:    09/12/22 0955   Weight: 106.6 kg (235 lb)   Height: 1.702 m (5' 7\")       Body mass index is 36.81 kg/m .          SHELLIE PUTNAM EMT    "

## 2022-09-12 NOTE — PROGRESS NOTES
Adriana is a 52 year old who is being evaluated via a billable video visit.      How would you like to obtain your AVS? MyChart  If the video visit is dropped, the invitation should be resent by: Text to cell phone: [phone]  Will anyone else be joining your video visit? No    Video-Visit Details    Video Start Time: 10:01    Type of service:  Video Visit    Video End Time:10:24 AM    Originating Location (pt. Location): Home    Distant Location (provider location):  [facility]NA[facility] [place]     Platform used for Video Visit: [employer]

## 2022-09-14 ENCOUNTER — TELEPHONE (OUTPATIENT)
Dept: ENDOCRINOLOGY | Facility: CLINIC | Age: 52
End: 2022-09-14

## 2022-09-22 DIAGNOSIS — E78.5 HYPERLIPIDEMIA LDL GOAL <100: ICD-10-CM

## 2022-09-22 RX ORDER — ATORVASTATIN CALCIUM 20 MG/1
TABLET, FILM COATED ORAL
Qty: 90 TABLET | Refills: 0 | Status: SHIPPED | OUTPATIENT
Start: 2022-09-22 | End: 2023-04-24

## 2022-10-17 ENCOUNTER — VIRTUAL VISIT (OUTPATIENT)
Dept: ENDOCRINOLOGY | Facility: CLINIC | Age: 52
End: 2022-10-17
Payer: COMMERCIAL

## 2022-10-17 DIAGNOSIS — E66.812 OBESITY, CLASS II, BMI 35-39.9: ICD-10-CM

## 2022-10-17 DIAGNOSIS — Z71.3 NUTRITIONAL COUNSELING: Primary | ICD-10-CM

## 2022-10-17 PROCEDURE — 97803 MED NUTRITION INDIV SUBSEQ: CPT | Mod: GT | Performed by: DIETITIAN, REGISTERED

## 2022-10-17 NOTE — PROGRESS NOTES
"Adriana Lucia is a 52 year old female who is being evaluated via a billable video visit.      The patient has been notified of following:     \"This video visit will be conducted via a call between you and your physician/provider. We have found that certain health care needs can be provided without the need for an in-person physical exam.  This service lets us provide the care you need with a video conversation.  If a prescription is necessary we can send it directly to your pharmacy.  If lab work is needed we can place an order for that and you can then stop by our lab to have the test done at a later time.    Video visits are billed at different rates depending on your insurance coverage.  Please reach out to your insurance provider with any questions.    If during the course of the call the physician/provider feels a video visit is not appropriate, you will not be charged for this service.\"    Patient has given verbal consent for Video visit? Yes  How would you like to obtain your AVS? MyChart  If you are dropped from the video visit, the video invite should be resent to: Text to cell phone: [phone]  Will anyone else be joining your video visit? No  {If patient encounters technical issues they should call [phone]      Video-Visit Details    Type of service:  Video Visit    Video Start Time: 1:02 pm  Video End Time: 1:20 pm    Originating Location (pt. Location): Home    Distant Location (provider location):  [facility] [place]     Platform used for Video Visit: [employer]    During this virtual visit the patient is located in [place], patient verifies this as the location during the entirety of this visit.     Weight Management Nutrition Consultation    Adriana Lucia is a 52 year old female presents today for new weight management nutrition consultation.    Patient referred by Dr. Spivey on September 12, 2022.    Patient with Co-morbidities of obesity " "including:  Type II DM no  Renal Failure no  Sleep apnea no  Hypertension yes  Dyslipidemia no  Joint pain no  Back pain no  GERD no     PMH:   pt is needing 36 lb weight loss for umbilical hernia repair     Anthropometrics:  Weight 9/12/22: 235 lbs with BMI 36.81    Estimated body mass index is 36.81 kg/m  as calculated from the following:    Height as of 9/12/22: 1.702 m (5' 7\").    Weight as of 9/12/22: 106.6 kg (235 lb).     Current weight: no recent weight per pt    Goal weight for surgery: 199 lbs    Medications for Weight Loss:  None    Supplements:     Vit D 5000 Ius/day     Hx of Vit D def    NUTRITION HISTORY  NKFA  Lactose intolerant - eats cheese, takes lactaid but still has some issues. Yogurt (no issues). Occ lactose free milk    Has never worked with a RD before. Has done WW in past.  Patient is needing weight loss for a hernia repair.  Pt goals: support/guidance, lose weight     Good support system at home     Typical day   B - wheat toast, pb, coffee OR protein bar  L - varies - hard to eat at work. Might be protein bar OR none OR sandwich   D - protein (usually fatter options per pt), salad, vegetable, bread   Snack - potato chip     Fluids: water, 1 cup coffee/day, alcohol (vodka with club soda or unsweetened lemonade) on weekends or occ during week    - Tries to stay hydrated, has overactive bladder so limits caffeine     If having pasta she will do zucchini noodles.   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  Main veggies in house: leafy greens, cauliflower, broccoli   Cooks with olive oil and uses on vegetables as well. Occ uses butter.     PA: tries to walk dog 2x/day    Patient shared via chat today  I do have a therapist but my mom is in the other room so Im not alone to share as much right now.     Oct 2022:  Pt reports cravings for comfort food.   Dad in/out of hospital. She hasn't been able to see him - 20 day covid protocol.     No overeating but making less " desirable choices than normal (take out, eating out).    Doing 3 meals/day. Small breakfast (toast with pb), medium-large size lunch and dinner is largest meal typically (unless getting a big lunch).     PA: walking dog 1-2x/day    Previous goals:  1. Look into cookbooks - Met, wants to start using Laure's test kitchen recipe book. Knows she has a Mediterranean cookbook somewhere on hand too.  2. Aim to follow plate method at lunch/dinner - In progress   3. Continue being primary  - Met  4. Calcium needs: 1200 mg/day from all sources     Additional information:  PT Pharmacy Tech at [employer]    Lives with spouse and child (7 year old son)   Mom sometimes stays with them     Nutrition Prescription  Recommended energy/nutrient modification.  1500 calories/day (per MD)    Nutrition Diagnosis  Food and Nutrition related knowledge deficit r/t lack of prior exposure to nutrition education aeb pt report and interest in education     Obesity r/t  positive energy balance aeb BMI >30.    Nutrition Intervention  Reviewed and modified previous goals  Answered pt questions  Coordination of care   Nutrition education   - Plate Method    - Starchy vs. Non-starchy Vegetables    - Mediterranean diet    - Saturated vs. Unsaturated fats    - Sodium   AVS and handouts via [employer]    Patient demonstrates understanding.    Expected Engagement: good    Nutrition Goals  1. Start using cookbooks  2. Aim for plate method at lunch/dinner (1/2 plate non-starchy vegetable, 1/4 protein, 1/4 carbohydrates (fruit, whole grains, starchy vegetables))     Resources:     Starchy vegetables:   ? Corn  ? Potatoes   ? Green peas  ? Beans/lentils     Protein ideas:  ? Shrimp  ? Turkey  ? Chicken   ? Fish   ? Eggs  ? Cottage cheese (if able to tolerate)   ? Beans/lentils     Types of fats  https://www.[facility].harvard.edu/nutritionsource/what-should-you-eat/fats-and-cholesterol/types-of-fat/    Tips for lowering sodium content of  meals  http://www.USEREADY/[email or web address]      Mediterranean Diet Resources    https://www.health.harvard.edu/staying-healthy/foods-that-fight-inflammation     https://www.heart.org/en/healthy-living/healthy-eating/eat-smart/nutrition-basics/mediterranean-diet     https://oldSpinelab.org/traditional-diets/mediterranean-diet     https://my.[facility].org/health/articles/16037-mediterranean-diet       Calcium sources:     ? Seeds (poppy, sesame, celery, sabrina)  ? Cheese (recommend low-fat or skim)  ? Yogurt  ? Cottage cheese  ? Beans/lentils   ? Almonds  ? Milk  ? Whey protein   ? Leafy greens   ? Edamame and tofu  ? Figs    The Plate Method:  https://www.cdc.gov/diabetes/images/managing/Diabetes-Manage-Eat-Well-Plate-Graphic_600px.jpg     Summary of Volumetrics Eating Plan  [email or web address]     Follow-Up:  Monday, November 14th at 10:30 am    Time spent with patient: 18 minutes.  GAMAL Lafleur, RD, LD

## 2022-10-17 NOTE — PATIENT INSTRUCTIONS
Nutrition Goals  1. Start using cookbooks  2. Aim for plate method at lunch/dinner (1/2 plate non-starchy vegetable, 1/4 protein, 1/4 carbohydrates (fruit, whole grains, starchy vegetables))     Resources:     Starchy vegetables:   Corn  Potatoes   Green peas  Beans/lentils     Protein ideas:  Shrimp  Turkey  Chicken   Fish   Eggs  Cottage cheese (if able to tolerate)   Beans/lentils     Types of fats  https://www.[facility].[place].[facility]/nutritionsource/what-should-you-eat/fats-and-cholesterol/types-of-fat/    Tips for lowering sodium content of meals  http://www.Aasonn/[email or web address]      Mediterranean Diet Resources    https://www.health.[place].edu/staying-healthy/foods-that-fight-inflammation     https://www.heart.org/en/healthy-living/healthy-eating/eat-smart/nutrition-basics/mediterranean-diet     https://PENRITH.org/traditional-diets/mediterranean-diet     https://my.[facility].org/health/articles/16037-mediterranean-diet       Calcium sources:     Seeds (poppy, sesame, celery, sabrina)  Cheese (recommend low-fat or skim)  Yogurt  Cottage cheese  Beans/lentils   Almonds  Milk  Whey protein   Leafy greens   Edamame and tofu  Figs    The Plate Method:  https://www.cdc.gov/diabetes/images/managing/Diabetes-Manage-Eat-Well-Plate-Graphic_600px.jpg     Summary of Volumetrics Eating Plan  [email or web address]     Follow-Up:  Monday, November 14th at 10:30 am    GAMAL Fischer, RD, LD  Clinic #: [phone]

## 2022-10-17 NOTE — LETTER
"10/17/2022       RE: Adriana Lucia  [address] [facility] [address]     Dear Colleague,    Thank you for referring your patient, Adriana Lucia, to the [facility] [place] at [facility]. Please see a copy of my visit note below.    Adriana Lucia is a 52 year old female who is being evaluated via a billable video visit.      The patient has been notified of following:     \"This video visit will be conducted via a call between you and your physician/provider. We have found that certain health care needs can be provided without the need for an in-person physical exam.  This service lets us provide the care you need with a video conversation.  If a prescription is necessary we can send it directly to your pharmacy.  If lab work is needed we can place an order for that and you can then stop by our lab to have the test done at a later time.    Video visits are billed at different rates depending on your insurance coverage.  Please reach out to your insurance provider with any questions.    If during the course of the call the physician/provider feels a video visit is not appropriate, you will not be charged for this service.\"    Patient has given verbal consent for Video visit? Yes  How would you like to obtain your AVS? MyChart  If you are dropped from the video visit, the video invite should be resent to: Text to cell phone: [phone]  Will anyone else be joining your video visit? No  {If patient encounters technical issues they should call [phone]      Video-Visit Details    Type of service:  Video Visit    Video Start Time: 1:02 pm  Video End Time: 1:20 pm    Originating Location (pt. Location): Home    Distant Location (provider location):  [facility] [place]     Platform used for Video Visit: [employer]    During this virtual visit the patient is located in [place], " "patient verifies this as the location during the entirety of this visit.     Weight Management Nutrition Consultation    Adriana Lucia is a 52 year old female presents today for new weight management nutrition consultation.    Patient referred by Dr. Spivey on September 12, 2022.    Patient with Co-morbidities of obesity including:  Type II DM no  Renal Failure no  Sleep apnea no  Hypertension yes  Dyslipidemia no  Joint pain no  Back pain no  GERD no     PMH:   pt is needing 36 lb weight loss for umbilical hernia repair     Anthropometrics:  Weight 9/12/22: 235 lbs with BMI 36.81    Estimated body mass index is 36.81 kg/m  as calculated from the following:    Height as of 9/12/22: 1.702 m (5' 7\").    Weight as of 9/12/22: 106.6 kg (235 lb).     Current weight: no recent weight per pt    Goal weight for surgery: 199 lbs    Medications for Weight Loss:  None    Supplements:     Vit D 5000 Ius/day     Hx of Vit D def    NUTRITION HISTORY  NKFA  Lactose intolerant - eats cheese, takes lactaid but still has some issues. Yogurt (no issues). Occ lactose free milk    Has never worked with a RD before. Has done WW in past.  Patient is needing weight loss for a hernia repair.  Pt goals: support/guidance, lose weight     Good support system at home     Typical day   B - wheat toast, pb, coffee OR protein bar  L - varies - hard to eat at work. Might be protein bar OR none OR sandwich   D - protein (usually fatter options per pt), salad, vegetable, bread   Snack - potato chip     Fluids: water, 1 cup coffee/day, alcohol (vodka with club soda or unsweetened lemonade) on weekends or occ during week    - Tries to stay hydrated, has overactive bladder so limits caffeine     If having pasta she will do zucchini noodles.   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  Main veggies in house: leafy greens, cauliflower, broccoli   Cooks with olive oil and uses on vegetables as well. Occ uses butter. "     PA: tries to walk dog 2x/day    Patient shared via chat today  I do have a therapist but my mom is in the other room so Im not alone to share as much right now.     Oct 2022:  Pt reports cravings for comfort food.   Dad in/out of hospital. She hasn't been able to see him - 20 day covid protocol.     No overeating but making less desirable choices than normal (take out, eating out).    Doing 3 meals/day. Small breakfast (toast with pb), medium-large size lunch and dinner is largest meal typically (unless getting a big lunch).     PA: walking dog 1-2x/day    Previous goals:  1. Look into cookbooks - Met, wants to start using Laure's test kitchen recipe book. Knows she has a Mediterranean cookbook somewhere on hand too.  2. Aim to follow plate method at lunch/dinner - In progress   3. Continue being primary  - Met  4. Calcium needs: 1200 mg/day from all sources     Additional information:  PT Pharmacy Tech at [employer]    Lives with spouse and child (7 year old son)   Mom sometimes stays with them     Nutrition Prescription  Recommended energy/nutrient modification.  1500 calories/day (per MD)    Nutrition Diagnosis  Food and Nutrition related knowledge deficit r/t lack of prior exposure to nutrition education aeb pt report and interest in education     Obesity r/t  positive energy balance aeb BMI >30.    Nutrition Intervention  Reviewed and modified previous goals  Answered pt questions  Coordination of care   Nutrition education   - Plate Method    - Starchy vs. Non-starchy Vegetables    - Mediterranean diet    - Saturated vs. Unsaturated fats    - Sodium   AVS and handouts via [employer]    Patient demonstrates understanding.    Expected Engagement: good    Nutrition Goals  1. Start using cookbooks  2. Aim for plate method at lunch/dinner (1/2 plate non-starchy vegetable, 1/4 protein, 1/4 carbohydrates (fruit, whole grains, starchy vegetables))     Resources:     Starchy vegetables:   ? Corn  ? Potatoes    ? Green peas  ? Beans/lentils     Protein ideas:  ? Shrimp  ? Turkey  ? Chicken   ? Fish   ? Eggs  ? Cottage cheese (if able to tolerate)   ? Beans/lentils     Types of fats  https://www.[facility].[place].[facility]/nutritionsource/what-should-you-eat/fats-and-cholesterol/types-of-fat/    Tips for lowering sodium content of meals  http://www.NowThis News/339705.pdf      Mediterranean Diet Resources    https://www.health.[place].edu/staying-healthy/foods-that-fight-inflammation     https://www.heart.org/en/healthy-living/healthy-eating/eat-smart/nutrition-basics/mediterranean-diet     https://SDI.org/traditional-diets/mediterranean-diet     https://my.[facility].org/health/articles/16037-mediterranean-diet       Calcium sources:     ? Seeds (poppy, sesame, celery, sabrina)  ? Cheese (recommend low-fat or skim)  ? Yogurt  ? Cottage cheese  ? Beans/lentils   ? Almonds  ? Milk  ? Whey protein   ? Leafy greens   ? Edamame and tofu  ? Figs    The Plate Method:  https://www.cdc.gov/diabetes/images/managing/Diabetes-Manage-Eat-Well-Plate-Graphic_600px.jpg     Summary of Volumetrics Eating Plan  [email or web address]     Follow-Up:  Monday, November 14th at 10:30 am    Time spent with patient: 18 minutes.  GAMAL Lafleur, RD, LD

## 2022-10-23 ENCOUNTER — HEALTH MAINTENANCE LETTER (OUTPATIENT)
Age: 52
End: 2022-10-23

## 2022-12-16 ENCOUNTER — E-VISIT (OUTPATIENT)
Dept: URGENT CARE | Facility: CLINIC | Age: 52
End: 2022-12-16
Payer: COMMERCIAL

## 2022-12-16 DIAGNOSIS — B96.89 ACUTE BACTERIAL SINUSITIS: Primary | ICD-10-CM

## 2022-12-16 DIAGNOSIS — J01.90 ACUTE BACTERIAL SINUSITIS: Primary | ICD-10-CM

## 2022-12-16 PROCEDURE — 99421 OL DIG E/M SVC 5-10 MIN: CPT | Performed by: EMERGENCY MEDICINE

## 2022-12-16 NOTE — PATIENT INSTRUCTIONS
Sinusitis (Antibiotic Treatment)    The sinuses are air-filled spaces within the bones of the face. They connect to the inside of the nose. Sinusitis is an inflammation of the tissue that lines the sinuses. Sinusitis can occur during a cold. It can also happen due to allergies to pollens and other particles in the air. Sinusitis can cause symptoms of sinus congestion and a feeling of fullness. A sinus infection causes fever, headache, and facial pain. There is often green or yellow fluid draining from the nose or into the back of the throat (post-nasal drip). You have been given antibiotics to treat this condition.   Home care    Take the full course of antibiotics as instructed. Don't stop taking them, even when you feel better.    Drink plenty of water, hot tea, and other liquids as directed by the healthcare provider. This may help thin nasal mucus. It also may help your sinuses drain fluids.    Heat may help soothe painful areas of your face. Use a towel soaked in hot water. Or,  the shower and direct the warm spray onto your face. Using a vaporizer along with a menthol rub at night may also help soothe symptoms.     An expectorant with guaifenesin may help thin nasal mucus and help your sinuses drain fluids. Talk with your provider or pharmacists before taking an over-the-counter (OTC) medicine if you have any questions about it or its side effects..    You can use an OTC decongestant, unless a similar medicine was prescribed to you. Nasal sprays work the fastest. Use one that contains phenylephrine or oxymetazoline. First blow your nose gently. Then use the spray. Don't use these medicines more often than directed on the label. If you do, your symptoms may get worse. You may also take pills that contain pseudoephedrine. Don t use products that combine multiple medicines. This is because side effects may be increased. Read labels. You can also ask the pharmacist for help. (People with high blood  pressure should not use decongestants. They can raise blood pressure.) Talk with your provider or pharmacist if you have any questions about the medicine..    OTC antihistamines may help if allergies contributed to your sinusitis. Talk with your provider or pharmacist if you have any questions about the medicine..    Don't use nasal rinses or irrigation during an acute sinus infection, unless your healthcare provider tells you to. Rinsing may spread the infection to other areas in your sinuses.    Use acetaminophen or ibuprofen to control pain, unless another pain medicine was prescribed to you. If you have chronic liver or kidney disease or ever had a stomach ulcer, talk with your healthcare provider before using these medicines. Never give aspirin to anyone under age 18 who is ill with a fever. It may cause severe liver damage.    Don't smoke. This can make symptoms worse.    Follow-up care  Follow up with your healthcare provider, or as advised.   When to seek medical advice  Call your healthcare provider if any of these occur:     Facial pain or headache that gets worse    Stiff neck    Unusual drowsiness or confusion    Swelling of your forehead or eyelids    Symptoms don't go away in 10 days    Vision problems, such as blurred or double vision    Fever of 100.4 F (38 C) or higher, or as directed by your healthcare provider  Call 911  Call 911 if any of these occur:     Seizure    Trouble breathing    Feeling dizzy or faint    Fingernails, skin or lips look blue, purple , or gray  Prevention  Here are steps you can take to help prevent an infection:     Keep good hand washing habits.    Don t have close contact with people who have sore throats, colds, or other upper respiratory infections.    Don t smoke, and stay away from secondhand smoke.    Stay up to date with of your vaccines.  [employer] last reviewed this educational content on 12/1/2019 2000-2021 [employer]. All rights reserved. This  information is not intended as a substitute for professional medical care. Always follow your healthcare professional's instructions.        Dear Adriana Lucia      Based on your responses and diagnosis, I have prescribed augmentin.  to treat your symptoms. I have sent this to your pharmacy.?     It is also important to stay well hydrated, get lots of rest and take over-the-counter decongestants,?tylenol?or ibuprofen if you?are able to?take those medications per your primary care provider to help relieve discomfort.?     It is important that you take?all of?your prescribed medication even if your symptoms are improving after a few doses.? Taking?all of?your medicine helps prevent the symptoms from returning.?     If your symptoms worsen, you develop severe headache, vomiting, high fever (>102), or are not improving in 7 days, please contact your primary care provider for an appointment or visit any of our convenient Walk-in Care or Urgent Care Centers to be seen which can be found on our website?here.?     Thanks again for choosing?us?as your health care partner,?   ?  Ceasar Villalta MD?

## 2023-01-30 ENCOUNTER — VIRTUAL VISIT (OUTPATIENT)
Dept: ENDOCRINOLOGY | Facility: CLINIC | Age: 53
End: 2023-01-30
Payer: COMMERCIAL

## 2023-01-30 ENCOUNTER — TELEPHONE (OUTPATIENT)
Dept: ENDOCRINOLOGY | Facility: CLINIC | Age: 53
End: 2023-01-30

## 2023-01-30 VITALS — HEIGHT: 67 IN | WEIGHT: 240 LBS | BODY MASS INDEX: 37.67 KG/M2

## 2023-01-30 DIAGNOSIS — E66.01 MORBID OBESITY (H): ICD-10-CM

## 2023-01-30 DIAGNOSIS — E66.812 OBESITY, CLASS II, BMI 35-39.9: Primary | ICD-10-CM

## 2023-01-30 PROCEDURE — 99213 OFFICE O/P EST LOW 20 MIN: CPT | Mod: VID | Performed by: INTERNAL MEDICINE

## 2023-01-30 ASSESSMENT — PAIN SCALES - GENERAL: PAINLEVEL: NO PAIN (0)

## 2023-01-30 NOTE — TELEPHONE ENCOUNTER
Confirmed via test claim, pa needed on [employer]  pharmacy insurance.  BIN:[id number] PCN:[id number] Group:[facility] ID:[id number]     Please initiate PA.

## 2023-01-30 NOTE — NURSING NOTE
"(   Chief Complaint   Patient presents with     RECHECK     Return MWM    )    ( Weight: 108.9 kg (240 lb) (Pt reported) )  ( Height: 170.2 cm (5' 7\") )  ( BMI (Calculated): 37.59 )  (   )  (   )  (   )  (   )  (   )  (   )    (   )  (   )  (   )  (   )  (   )  (   )  (   )    (   Patient Active Problem List   Diagnosis     Hyperlipidemia     High-risk pregnancy, AMA     HSV (herpes simplex virus) infection     Cervical polyp     Elevated blood pressure reading without diagnosis of hypertension     Vitamin D deficiency     LGA >97th %ile at 36+6 (efw 3871 g)     Chronic hypertension     S/P  section     Chronic hypertension with superimposed preeclampsia     Cherry angioma     Skin cancer screening     Multiple benign nevi     Dermatofibroma     Skin tag     Somatic dysfunction of pelvic region     Morbid obesity (H)     Restless legs syndrome (RLS)     Overactive bladder    )  (   Current Outpatient Medications   Medication Sig Dispense Refill     atorvastatin (LIPITOR) 20 MG tablet TAKE ONE TABLET BY MOUTH ONE TIME DAILY 90 tablet 0     enalapril (VASOTEC) 20 MG tablet Take 1 tablet (20 mg) by mouth daily 90 tablet 3     mirabegron (MYRBETRIQ) 50 MG 24 hr tablet Take 1 tablet (50 mg) by mouth daily 90 tablet 3     pramipexole (MIRAPEX) 0.75 MG tablet Take 1 tablet by mouth at 1.5-2 hours before bed. 90 tablet 3     SUMAtriptan (IMITREX) 50 MG tablet Take 1 tablet (50 mg) by mouth at onset of headache for migraine 12 tablet 4     VITAMIN D, CHOLECALCIFEROL, PO Take 5,000 Units by mouth every other day       bisacodyl (DULCOLAX) 5 MG EC tablet Take as directed. One day before the exam at 4 PM take 2 Dulcolax (Bisacodyl) tablets.  Day of exam at 6 AM take 2 Dulcolax (Bisacodyl) tablets. 4 tablet 0     polyethylene glycol (MIRALAX) 17 GM/Dose powder Take as directed. Day before exam At 4 pm, mix the entire bottle of Miralax with 64 ounces of Gatorade in a pitcher and stir to dissolve the powder. Chill if " desired, but do not add ice. At 5 pm, start drinking an 8-ounce glass of the Miralax and Gatorade mixture every 15 minutes until the pitcher is half empty (about 4 glasses).  Store the rest in the refrigerator. Day of exam at 6 AM start drinking an 8-ounce glass of Miralax and Gatorade mixture every 15 minutes until the pitcher is empty. 238 g 0    )  ( Diabetes Eval:    )    ( Pain Eval:  No Pain (0) )    ( Wound Eval:       )    (   History   Smoking Status     Never   Smokeless Tobacco     Former     Quit date: 8/8/2014    )    ( Signed By:  Jeff Freeman EMT; January 30, 2023; 9:06 AM )

## 2023-01-30 NOTE — PROGRESS NOTES
Adriana Lucia is a 52 year old who is being evaluated via a billable video visit.      How would you like to obtain your AVS? MyChart  If the video visit is dropped, the invitation should be resent by: Text to cell phone: [phone]  Will anyone else be joining your video visit? No  If patient encounters technical issues they should call [phone]    During this virtual visit the patient is located in [place], patient verifies this as the location during the entirety of this visit.     Video-Visit Details  Joined the call at 1/30/2023, 9:32:29 am.  Left the call at 1/30/2023, 9:47:37 am.    Originating Location (pt. Location): Home    Distant Location (provider location):  Off-site    Platform used for Video Visit: Nehemiah Freeman, EMT 1/30/2023      7:08 AM

## 2023-01-30 NOTE — PROGRESS NOTES
"    Return Medical Weight Management Note     Adriana Lucia  MRN:  [id number]  :  1970  SIMA:  2023    Dear Nadira Colon MD,    I had the pleasure of seeing your patient Adriana Lucia.  She is a 52 year old female who I am continuing to see for treatment of obesity related to:     2022   I have the following health issues associated with obesity: High Blood Pressure, High Cholesterol   I have the following symptoms associated with obesity: None of the above     CURRENT WEIGHT:   240 lbs 0 oz    Wt Readings from Last 4 Encounters:   23 108.9 kg (240 lb)   22 106.6 kg (235 lb)   22 107.4 kg (236 lb 11.2 oz)   22 104.3 kg (230 lb)     Height:  5' 7\"  Body Mass Index:  Body mass index is 37.59 kg/m .  Vitals:  B/P: Data Unavailable, P: Data Unavailable    Initial consult weight was 235 on 22.  Weight change since last seen on 22 is up 5 pounds.   Total gain is 5 pounds.    INTERVAL HISTORY:  She has been trying healthy eating without medication but now feels this will not work.    Diet Recall Review with Patient 2022   Do you typically eat breakfast? Yes   If you do eat breakfast, what types of food do you eat? Wheat toast with pranut butter   Do you typically eat lunch? Yes   If you do eat lunch, what types of food do you typically eat?  [place], or protein bar, or yogurr   Do you typically eat supper? Yes   If you do eat supper, what types of food do you typically eat? Protein, salad or vegetable and a starch   Do you typically eat snacks? No   Do you like vegetables?  Yes   Do you drink water? Yes   How many glasses of juice do you drink in a typical day? 0   How many of glasses of milk do you drink in a typical day? 0   If you do drink milk, what type? N/A   How many 8oz glasses of sugar containing drinks such as Anselmo-Aid/sweet tea do you drink in a day? 0   How many cans/bottles of sugar pop/soda/tea/sports drinks do you drink in a day? 0 "   How many cans/bottles of diet pop/soda/tea or sports drink do you drink in a day? 0   How often do you have a drink of alcohol? 2-4 Times a Month   If you do drink, how many drinks might you have in a day? 3-4     MEDICATIONS:   Current Outpatient Medications   Medication     atorvastatin (LIPITOR) 20 MG tablet     enalapril (VASOTEC) 20 MG tablet     mirabegron (MYRBETRIQ) 50 MG 24 hr tablet     pramipexole (MIRAPEX) 0.75 MG tablet     SUMAtriptan (IMITREX) 50 MG tablet     VITAMIN D, CHOLECALCIFEROL, PO     bisacodyl (DULCOLAX) 5 MG EC tablet     polyethylene glycol (MIRALAX) 17 GM/Dose powder     No current facility-administered medications for this visit.     Weight Loss Medication History Reviewed With Patient 1/29/2023   Which weight loss medications are you currently taking on a regular basis?  None   Are you having any side effects from the weight loss medication that we have prescribed you? No     ASSESSMENT:   Agreed on trial of Saxenda in support of food change - focus on no between meal snacking, aggressive lowering of starches and cheese    FOLLOW-UP:    12 weeks.    Sincerely,  Warren Spivey MD

## 2023-01-30 NOTE — LETTER
"2023       RE: Adriana Lucia  [address] [facility] [address]     Dear Colleague,    Thank you for referring your patient, Adriana Lucia, to the [facility] [place] [facility]. Please see a copy of my visit note below.    Adriana Lucia is a 52 year old who is being evaluated via a billable video visit.      How would you like to obtain your AVS? MyChart  If the video visit is dropped, the invitation should be resent by: Text to cell phone: [phone]  Will anyone else be joining your video visit? No  If patient encounters technical issues they should call [phone]    During this virtual visit the patient is located in [place], patient verifies this as the location during the entirety of this visit.     Video-Visit Details  Joined the call at 2023, 9:32:29 am.  Left the call at 2023, 9:47:37 am.    Originating Location (pt. Location): Home    Distant Location (provider location):  Off-site    Platform used for Video Visit: YOLANDA Anaya 2023      7:08 AM          Return Medical Weight Management Note     Adriana Lucia  MRN:  [id number]  :  1970  SIMA:  2023    Dear Nadira Colon MD,    I had the pleasure of seeing your patient Adriana Lucia.  She is a 52 year old female who I am continuing to see for treatment of obesity related to:     2022   I have the following health issues associated with obesity: High Blood Pressure, High Cholesterol   I have the following symptoms associated with obesity: None of the above     CURRENT WEIGHT:   240 lbs 0 oz    Wt Readings from Last 4 Encounters:   23 108.9 kg (240 lb)   22 106.6 kg (235 lb)   22 107.4 kg (236 lb 11.2 oz)   22 104.3 kg (230 lb)     Height:  5' 7\"  Body Mass Index:  Body mass index is 37.59 kg/m .  Vitals:  B/P: Data Unavailable, P: Data " Unavailable    Initial consult weight was 235 on 9/12/22.  Weight change since last seen on 09/12/22 is up 5 pounds.   Total gain is 5 pounds.    INTERVAL HISTORY:  She has been trying healthy eating without medication but now feels this will not work.    Diet Recall Review with Patient 9/8/2022   Do you typically eat breakfast? Yes   If you do eat breakfast, what types of food do you eat? Wheat toast with pranut butter   Do you typically eat lunch? Yes   If you do eat lunch, what types of food do you typically eat?  [place], or protein bar, or yogurr   Do you typically eat supper? Yes   If you do eat supper, what types of food do you typically eat? Protein, salad or vegetable and a starch   Do you typically eat snacks? No   Do you like vegetables?  Yes   Do you drink water? Yes   How many glasses of juice do you drink in a typical day? 0   How many of glasses of milk do you drink in a typical day? 0   If you do drink milk, what type? N/A   How many 8oz glasses of sugar containing drinks such as Anselmo-Aid/sweet tea do you drink in a day? 0   How many cans/bottles of sugar pop/soda/tea/sports drinks do you drink in a day? 0   How many cans/bottles of diet pop/soda/tea or sports drink do you drink in a day? 0   How often do you have a drink of alcohol? 2-4 Times a Month   If you do drink, how many drinks might you have in a day? 3-4     MEDICATIONS:   Current Outpatient Medications   Medication     atorvastatin (LIPITOR) 20 MG tablet     enalapril (VASOTEC) 20 MG tablet     mirabegron (MYRBETRIQ) 50 MG 24 hr tablet     pramipexole (MIRAPEX) 0.75 MG tablet     SUMAtriptan (IMITREX) 50 MG tablet     VITAMIN D, CHOLECALCIFEROL, PO     bisacodyl (DULCOLAX) 5 MG EC tablet     polyethylene glycol (MIRALAX) 17 GM/Dose powder     No current facility-administered medications for this visit.     Weight Loss Medication History Reviewed With Patient 1/29/2023   Which weight loss medications are you currently taking on a regular  basis?  None   Are you having any side effects from the weight loss medication that we have prescribed you? No     ASSESSMENT:   Agreed on trial of Saxenda in support of food change - focus on no between meal snacking, aggressive lowering of starches and cheese    FOLLOW-UP:    12 weeks.    Sincerely,  Warren Spivey MD

## 2023-02-01 NOTE — TELEPHONE ENCOUNTER
PA Initiation    Medication: liraglutide - Weight Management (SAXENDA) 18 MG/3ML pen   Insurance Company: [employer] - Phone [phone] Fax [phone]  Pharmacy Filling the Rx: [facility][id number] - [address]  Filling Pharmacy Phone: [phone]  Filling Pharmacy Fax: [phone]  Start Date: 1/31/2023

## 2023-02-01 NOTE — TELEPHONE ENCOUNTER
Prior Authorization Approval    Authorization Effective Date: 1/2/2023  Authorization Expiration Date: 6/1/2023  Medication: liraglutide - Weight Management (SAXENDA) 18 MG/3ML pen--APPROVED  Approved Dose/Quantity:   Reference #:     Insurance Company: [employer] - Phone [phone] Fax [phone]  Expected CoPay:       CoPay Card Available:      Foundation Assistance Needed:    Which Pharmacy is filling the prescription (Not needed for infusion/clinic administered): [facility] #[id number] - [address]  Pharmacy Notified: Yes  Patient Notified: Yes **Instructed pharmacy to notify patient when script is ready to /ship.**

## 2023-02-08 DIAGNOSIS — E66.812 OBESITY, CLASS II, BMI 35-39.9: Primary | ICD-10-CM

## 2023-03-01 ENCOUNTER — MYC MEDICAL ADVICE (OUTPATIENT)
Dept: ENDOCRINOLOGY | Facility: CLINIC | Age: 53
End: 2023-03-01
Payer: COMMERCIAL

## 2023-03-01 DIAGNOSIS — E66.812 OBESITY, CLASS II, BMI 35-39.9: Primary | ICD-10-CM

## 2023-03-08 ENCOUNTER — MYC MEDICAL ADVICE (OUTPATIENT)
Dept: PULMONOLOGY | Facility: OTHER | Age: 53
End: 2023-03-08

## 2023-03-08 DIAGNOSIS — G25.81 RESTLESS LEGS SYNDROME (RLS): ICD-10-CM

## 2023-03-09 RX ORDER — PRAMIPEXOLE DIHYDROCHLORIDE 1 MG/1
TABLET ORAL
Qty: 90 TABLET | Refills: 1 | Status: SHIPPED | OUTPATIENT
Start: 2023-03-09 | End: 2023-04-24

## 2023-03-21 ENCOUNTER — ANCILLARY PROCEDURE (OUTPATIENT)
Dept: GENERAL RADIOLOGY | Facility: CLINIC | Age: 53
End: 2023-03-21
Attending: PHYSICIAN ASSISTANT
Payer: COMMERCIAL

## 2023-03-21 ENCOUNTER — OFFICE VISIT (OUTPATIENT)
Dept: FAMILY MEDICINE | Facility: CLINIC | Age: 53
End: 2023-03-21
Payer: COMMERCIAL

## 2023-03-21 VITALS
SYSTOLIC BLOOD PRESSURE: 124 MMHG | HEART RATE: 92 BPM | TEMPERATURE: 98.1 F | OXYGEN SATURATION: 100 % | DIASTOLIC BLOOD PRESSURE: 72 MMHG

## 2023-03-21 DIAGNOSIS — M79.674 TOE PAIN, RIGHT: ICD-10-CM

## 2023-03-21 DIAGNOSIS — S92.501A CLOSED FRACTURE OF PHALANX OF RIGHT FIFTH TOE, INITIAL ENCOUNTER: Primary | ICD-10-CM

## 2023-03-21 DIAGNOSIS — M79.671 RIGHT FOOT PAIN: ICD-10-CM

## 2023-03-21 PROCEDURE — 99213 OFFICE O/P EST LOW 20 MIN: CPT

## 2023-03-21 PROCEDURE — 73630 X-RAY EXAM OF FOOT: CPT | Mod: TC | Performed by: RADIOLOGY

## 2023-03-21 ASSESSMENT — ENCOUNTER SYMPTOMS
MYALGIAS: 1
COLOR CHANGE: 1
CONSTITUTIONAL NEGATIVE: 1
JOINT SWELLING: 1
ARTHRALGIAS: 1

## 2023-03-21 NOTE — PATIENT INSTRUCTIONS
Rest: Try and avoid any trauma or reinjury to it over the next several days, avoid impact activities, perform only light duties  Ice: Ice for 5 to 15 minutes several times throughout the first 24 to 48 hours and then after practice or activity.  I recommend using ice bucket versus an ice pack  Compression: Consider using a brace which may help you return to activity earlier or an ACE wrap  Elevation: When not using keep the injury elevated to the level of the heart    You can take ibuprofen alternated with Tylenol for the pain and swelling  Ibuprofen 600 mg (3 of the 200 mg OTC tablets or 600 mg of the children's liquid) up to 4 times daily with food or milk  Tylenol 1000 mg every 8 hours as needed

## 2023-03-21 NOTE — PROGRESS NOTES
Assessment & Plan       ICD-10-CM    1. Closed fracture of phalanx of right fifth toe, initial encounter  S92.501A Orthopedic  Referral     Ankle/Foot Bracing Supplies DME Walking Boot; Right; Pneumatic; Short      2. Right foot pain  M79.671 XR Foot Right G/E 3 Views     CANCELED: XR Foot Right G/E 3 Views      3. Toe pain, right  M79.674 XR Foot Right G/E 3 Views     CANCELED: XR Foot Right G/E 3 Views           Patient instructions:  Given walking boot and buddy tape for foot at night. To follow up with Ortho.   Patient Instructions   Rest: Try and avoid any trauma or reinjury to it over the next several days, avoid impact activities, perform only light duties  Ice: Ice for 5 to 15 minutes several times throughout the first 24 to 48 hours and then after practice or activity.  I recommend using ice bucket versus an ice pack  Compression: Consider using a brace which may help you return to activity earlier or an ACE wrap  Elevation: When not using keep the injury elevated to the level of the heart    You can take ibuprofen alternated with Tylenol for the pain and swelling  Ibuprofen 600 mg (3 of the 200 mg OTC tablets or 600 mg of the children's liquid) up to 4 times daily with food or milk  Tylenol 1000 mg every 8 hours as needed      Medical decision making:  XR shows closed fracture of right proximal phalange to my read.     Return if symptoms worsen or fail to improve, for Follow up.    At the end of the encounter, I discussed results, diagnosis, medications. Discussed red flags for immediate return to clinic/ER, as well as indications for follow up if no improvement. Patient understood and agreed to plan. Patient was stable for discharge.    Ioana Wright is a 52 year old female who presents to clinic today for the following health issues:  Chief Complaint   Patient presents with     Urgent Care     Foot Injury     Pt opened her door at home and she was walking and the door slammed right to her  right foot. 5th toe is what she is more concern.     Adriana presents with reports of right little toe and foot pain x 2-3 days. She reports  she opened a door at home and stubbed her toe signficantly. She reports bruising and tenderness that is not improving but worsening.           Review of Systems   Constitutional: Negative.    Musculoskeletal: Positive for arthralgias, gait problem, joint swelling and myalgias.   Skin: Positive for color change.       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  2018-10: Urge incontinence of urine  2015: Cherry angioma  2015: Skin cancer screening  2015: Multiple benign nevi  2015: Dermatofibroma  2015: Skin tag  2015: Chronic hypertension with superimposed preeclampsia  2015: S/P  section  2015: Chronic hypertension  2015: LGA >97th %ile at 36+6 (efw 3871 g)  2014: High-risk pregnancy, AMA  2014: HSV (herpes simplex virus) infection  2014: Cervical polyp  2014: Elevated blood pressure reading without diagnosis of   hypertension  2014: Vitamin D deficiency  2014: Hyperlipidemia  2013: Annual physical exam      Past Medical History:   Diagnosis Date     HSV (herpes simplex virus) infection      Hyperlipidemia      Hypertension        Social History     Tobacco Use     Smoking status: Never     Smokeless tobacco: Former     Quit date: 2014   Substance Use Topics     Alcohol use: Yes           Objective    /72   Pulse 92   Temp 98.1  F (36.7  C) (Tympanic)   SpO2 100%   Physical Exam  Constitutional:       Appearance: Normal appearance.   HENT:      Head: Normocephalic and atraumatic.   Cardiovascular:      Pulses:           Dorsalis pedis pulses are 2+ on the right side.        Posterior tibial pulses are 2+ on the right side.   Musculoskeletal:      Cervical  back: Normal range of motion and neck supple.   Feet:      Right foot:      Skin integrity: Skin integrity normal.      Toenail Condition: Right toenails are normal.      Comments: Bruising at base of right little toe, decreased ROM of little toe, swelling noted  Skin:     General: Skin is warm and dry.   Neurological:      General: No focal deficit present.      Mental Status: She is alert and oriented to person, place, and time.   Psychiatric:         Mood and Affect: Mood normal.         Behavior: Behavior normal.         Thought Content: Thought content normal.         Judgment: Judgment normal.              Collin Kumari PA-C

## 2023-03-22 ENCOUNTER — TELEPHONE (OUTPATIENT)
Dept: ORTHOPEDICS | Facility: CLINIC | Age: 53
End: 2023-03-22
Payer: COMMERCIAL

## 2023-03-22 NOTE — TELEPHONE ENCOUNTER
Message:  Pt's FRACTURE referral is ON FILE. Next available [facility] Sports Med appt is on 4.5.23    Pt needs to follow up, prior to 4.5.23    Pt is in a boot.    Are you able to fit pt in, to be seen, as soon as possible?    Please CALL pt to discuss. Thank you.    Reason for Call: Other: FRACTURE Referral     Action Taken: Message routed to:  [facility] ([facility]): Team    Travel Screening: Not Applicable

## 2023-03-23 ENCOUNTER — TELEPHONE (OUTPATIENT)
Dept: ORTHOPEDICS | Facility: CLINIC | Age: 53
End: 2023-03-23
Payer: COMMERCIAL

## 2023-03-23 ENCOUNTER — DOCUMENTATION ONLY (OUTPATIENT)
Dept: ORTHOPEDICS | Facility: CLINIC | Age: 53
End: 2023-03-23
Payer: COMMERCIAL

## 2023-03-23 NOTE — PROGRESS NOTES
Orthopedic/Sports Medicine Fracture Triage    Incoming call/or message from call center member.    Fracture type: Toe.    The patient is in a  off the shelf brace. Walking boot    Date of injury 3/19/2023.    Triaged by: Dr. Dick.    Determined to be managed Non operatively.    Needs to be seen within 1 week.    Additional Comments/information: Staff message sent to ortho coordinators to call patient and schedule with Sports Medicine.     Catrachita Gaming, ATC

## 2023-03-23 NOTE — TELEPHONE ENCOUNTER
Lvm with scheduling number for patient to call and schedule for next avilable for toe fracture with a sports provider.

## 2023-03-24 NOTE — TELEPHONE ENCOUNTER
DIAGNOSIS: toe fracture   APPOINTMENT DATE: 4.6.23- right   NOTES STATUS DETAILS   OFFICE NOTE from referring provider Internal 3.21.23 Collin Kumari, PANikkiC   OFFICE NOTE from other specialist Internal 2.24.20 Oscar Santamaria, DO   MEDICATION LIST Internal    XRAYS (IMAGES & REPORTS) Internal 3.21.23 R foot

## 2023-04-06 ENCOUNTER — PRE VISIT (OUTPATIENT)
Dept: ORTHOPEDICS | Facility: CLINIC | Age: 53
End: 2023-04-06

## 2023-04-20 ENCOUNTER — TELEPHONE (OUTPATIENT)
Dept: OBGYN | Facility: CLINIC | Age: 53
End: 2023-04-20
Payer: COMMERCIAL

## 2023-04-20 NOTE — TELEPHONE ENCOUNTER
Prior Authorization Retail Medication Request    Medication/Dose:  myrbetriq ER  24 hour 50 mg tablet  Take one tablet by mouth one time daily  ICD code (if different than what is on RX):  Overactive bladder N32.81 and urge incontinence N39.41  Previously Tried and Failed:  interstem  Rationale:  Urge incontinence after interstim procedure  Patient has been on this medication for years    Insurance Name:  [employer]  Insurance ID:  [id number]      Pharmacy Information (if different than what is on RX)  Name:  [facility] pharmacy   Phone:  [phone]

## 2023-04-20 NOTE — TELEPHONE ENCOUNTER
Called [facility] to clarify insurance that needs PA-    [facility] needs the PA, not [employer].  [place] through [place]

## 2023-04-20 NOTE — TELEPHONE ENCOUNTER
Received call from patient. Patient returning Kylee's call to let us know that she is still taking Myrbetriq. She requests a prior authorization. Routed to Kylee.

## 2023-04-20 NOTE — TELEPHONE ENCOUNTER
Central Prior Authorization Team   Phone: [phone]    PA Initiation    Medication: Myrbetriq 50MG er tablets    Insurance Company: [employer] - Phone [phone] Fax [phone]  Pharmacy Filling the Rx: [id number] [address]  Filling Pharmacy Phone: [phone]  Filling Pharmacy Fax:    Start Date: 4/20/2023

## 2023-04-21 NOTE — TELEPHONE ENCOUNTER
PRIOR AUTHORIZATION DENIED    Medication:     Denial Date: 4/21/2023    Denial Rational: Needs to try/fail at least 3 formulary alternatives listed below        Appeal Information: This medication was denied. If physician would like to appeal because patient has contraindication or allergy to covered medication please write letter of medical necessity and route back to PA team to initiate.  If no further action is needed please close encounter thank you.

## 2023-04-24 ENCOUNTER — OFFICE VISIT (OUTPATIENT)
Dept: INTERNAL MEDICINE | Facility: CLINIC | Age: 53
End: 2023-04-24
Attending: INTERNAL MEDICINE
Payer: COMMERCIAL

## 2023-04-24 ENCOUNTER — LAB (OUTPATIENT)
Dept: LAB | Facility: CLINIC | Age: 53
End: 2023-04-24
Attending: INTERNAL MEDICINE
Payer: COMMERCIAL

## 2023-04-24 VITALS
SYSTOLIC BLOOD PRESSURE: 121 MMHG | DIASTOLIC BLOOD PRESSURE: 81 MMHG | WEIGHT: 217 LBS | HEIGHT: 67 IN | BODY MASS INDEX: 34.06 KG/M2 | HEART RATE: 99 BPM

## 2023-04-24 DIAGNOSIS — I10 ESSENTIAL HYPERTENSION, BENIGN: ICD-10-CM

## 2023-04-24 DIAGNOSIS — Z00.00 ROUTINE GENERAL MEDICAL EXAMINATION AT A HEALTH CARE FACILITY: ICD-10-CM

## 2023-04-24 DIAGNOSIS — E78.5 HYPERLIPIDEMIA LDL GOAL <100: ICD-10-CM

## 2023-04-24 DIAGNOSIS — Z00.00 ROUTINE GENERAL MEDICAL EXAMINATION AT A HEALTH CARE FACILITY: Primary | ICD-10-CM

## 2023-04-24 LAB
ANION GAP SERPL CALCULATED.3IONS-SCNC: 13 MMOL/L (ref 7–15)
BUN SERPL-MCNC: 13.2 MG/DL (ref 6–20)
CALCIUM SERPL-MCNC: 10.3 MG/DL (ref 8.6–10)
CHLORIDE SERPL-SCNC: 102 MMOL/L (ref 98–107)
CHOLEST SERPL-MCNC: 163 MG/DL
CREAT SERPL-MCNC: 0.74 MG/DL (ref 0.51–0.95)
DEPRECATED HCO3 PLAS-SCNC: 27 MMOL/L (ref 22–29)
ERYTHROCYTE [DISTWIDTH] IN BLOOD BY AUTOMATED COUNT: 12 % (ref 10–15)
FERRITIN SERPL-MCNC: 135 NG/ML (ref 11–328)
GFR SERPL CREATININE-BSD FRML MDRD: >90 ML/MIN/1.73M2
GLUCOSE SERPL-MCNC: 94 MG/DL (ref 70–99)
HCT VFR BLD AUTO: 41 % (ref 35–47)
HDLC SERPL-MCNC: 49 MG/DL
HGB BLD-MCNC: 14 G/DL (ref 11.7–15.7)
LDLC SERPL CALC-MCNC: 94 MG/DL
MCH RBC QN AUTO: 30.7 PG (ref 26.5–33)
MCHC RBC AUTO-ENTMCNC: 34.1 G/DL (ref 31.5–36.5)
MCV RBC AUTO: 90 FL (ref 78–100)
NONHDLC SERPL-MCNC: 114 MG/DL
PLATELET # BLD AUTO: 252 10E3/UL (ref 150–450)
POTASSIUM SERPL-SCNC: 3.5 MMOL/L (ref 3.4–5.3)
RBC # BLD AUTO: 4.56 10E6/UL (ref 3.8–5.2)
SODIUM SERPL-SCNC: 142 MMOL/L (ref 136–145)
TRIGL SERPL-MCNC: 99 MG/DL
WBC # BLD AUTO: 5.3 10E3/UL (ref 4–11)

## 2023-04-24 PROCEDURE — 80061 LIPID PANEL: CPT

## 2023-04-24 PROCEDURE — 36415 COLL VENOUS BLD VENIPUNCTURE: CPT

## 2023-04-24 PROCEDURE — 87624 HPV HI-RISK TYP POOLED RSLT: CPT | Performed by: INTERNAL MEDICINE

## 2023-04-24 PROCEDURE — 99396 PREV VISIT EST AGE 40-64: CPT | Performed by: INTERNAL MEDICINE

## 2023-04-24 PROCEDURE — G0145 SCR C/V CYTO,THINLAYER,RESCR: HCPCS | Performed by: INTERNAL MEDICINE

## 2023-04-24 PROCEDURE — 85027 COMPLETE CBC AUTOMATED: CPT

## 2023-04-24 PROCEDURE — G0463 HOSPITAL OUTPT CLINIC VISIT: HCPCS | Mod: 25 | Performed by: INTERNAL MEDICINE

## 2023-04-24 PROCEDURE — 82728 ASSAY OF FERRITIN: CPT

## 2023-04-24 PROCEDURE — 82947 ASSAY GLUCOSE BLOOD QUANT: CPT

## 2023-04-24 RX ORDER — ENALAPRIL MALEATE 20 MG/1
20 TABLET ORAL DAILY
Qty: 90 TABLET | Refills: 3 | Status: SHIPPED | OUTPATIENT
Start: 2023-04-24 | End: 2024-04-02

## 2023-04-24 RX ORDER — ATORVASTATIN CALCIUM 20 MG/1
20 TABLET, FILM COATED ORAL DAILY
Qty: 90 TABLET | Refills: 3 | Status: SHIPPED | OUTPATIENT
Start: 2023-04-24

## 2023-04-24 ASSESSMENT — ENCOUNTER SYMPTOMS
DOUBLE VISION: 0
EYE IRRITATION: 0
LIGHT-HEADEDNESS: 0
VOMITING: 0
MYALGIAS: 0
SWOLLEN GLANDS: 0
DIFFICULTY URINATING: 0
DECREASED LIBIDO: 0
LEG PAIN: 0
DIZZINESS: 0
NUMBNESS: 0
FEVER: 0
EYE PAIN: 0
ARTHRALGIAS: 0
MUSCLE WEAKNESS: 0
BREAST MASS: 0
DYSPNEA ON EXERTION: 0
JOINT SWELLING: 0
WEIGHT GAIN: 0
CHILLS: 0
POLYDIPSIA: 0
HEADACHES: 0
FLANK PAIN: 0
DYSURIA: 0
TACHYCARDIA: 0
POOR WOUND HEALING: 0
SHORTNESS OF BREATH: 0
SNORES LOUDLY: 0
SPEECH CHANGE: 0
TASTE DISTURBANCE: 0
COUGH: 0
SYNCOPE: 0
DECREASED CONCENTRATION: 0
FATIGUE: 0
DISTURBANCES IN COORDINATION: 0
BRUISES/BLEEDS EASILY: 0
HYPERTENSION: 0
CLAUDICATION: 0
DEPRESSION: 0
EYE WATERING: 0
BACK PAIN: 0
POSTURAL DYSPNEA: 0
INCREASED ENERGY: 0
DECREASED APPETITE: 0
HEARTBURN: 0
LEG SWELLING: 0
WHEEZING: 0
SPUTUM PRODUCTION: 0
MUSCLE CRAMPS: 0
PARALYSIS: 0
EXERCISE INTOLERANCE: 0
NAUSEA: 0
HEMOPTYSIS: 0
SINUS CONGESTION: 0
BREAST PAIN: 0
CONSTIPATION: 0
SMELL DISTURBANCE: 0
BLOATING: 0
HOT FLASHES: 0
NIGHT SWEATS: 0
NAIL CHANGES: 0
NECK MASS: 0
INSOMNIA: 0
COUGH DISTURBING SLEEP: 0
ALTERED TEMPERATURE REGULATION: 0
ORTHOPNEA: 0
SORE THROAT: 0
HALLUCINATIONS: 0
PANIC: 0
SEIZURES: 0
JAUNDICE: 0
PALPITATIONS: 0
EYE REDNESS: 0
WEIGHT LOSS: 0
WEAKNESS: 0
RESPIRATORY PAIN: 0
NERVOUS/ANXIOUS: 0
EXTREMITY NUMBNESS: 0
RECTAL PAIN: 0
TROUBLE SWALLOWING: 0
POLYPHAGIA: 0
SLEEP DISTURBANCES DUE TO BREATHING: 0
HEMATURIA: 0
DIARRHEA: 0
HOARSE VOICE: 0
SINUS PAIN: 0
HYPOTENSION: 0
STIFFNESS: 0
MEMORY LOSS: 0
TREMORS: 0
BLOOD IN STOOL: 0
SKIN CHANGES: 0
LOSS OF CONSCIOUSNESS: 0
ABDOMINAL PAIN: 0
TINGLING: 0
RECTAL BLEEDING: 0
NECK PAIN: 0
BOWEL INCONTINENCE: 0

## 2023-04-24 ASSESSMENT — PAIN SCALES - GENERAL: PAINLEVEL: NO PAIN (0)

## 2023-04-24 NOTE — PROGRESS NOTES
SUBJECTIVE:   CC: Adriana Lucia is an 52 year old woman who presents for preventive health visit.       Patient has been advised of split billing requirements and indicates understanding: Yes  Healthy Habits:    Do you get at least three servings of calcium containing foods daily (dairy, green leafy vegetables, etc.)? yes    Amount of exercise or daily activities, outside of work: recent foot fracture, walking the dog currently    Problems taking medications regularly No    Medication side effects: No    Have you had an eye exam in the past two years? yes    Do you see a dentist twice per year? yes    Do you have sleep apnea, excessive snoring or daytime drowsiness?no      -------------------------------------    Today's PHQ-2 Score:     4/23/2023     8:49 AM 7/27/2022     4:02 PM   PHQ-2 ( 1999 [employer])   Q1: Little interest or pleasure in doing things 0 0   Q2: Feeling down, depressed or hopeless 0 0   PHQ-2 Score 0 0   Q1: Little interest or pleasure in doing things Not at all Not at all   Q2: Feeling down, depressed or hopeless Not at all Not at all   PHQ-2 Score 0 0       Abuse: Current or Past(Physical, Sexual or Emotional)- No  Do you feel safe in your environment? Yes        Social History     Tobacco Use     Smoking status: Never     Smokeless tobacco: Former     Quit date: 8/8/2014   Vaping Use     Vaping status: Not on file   Substance Use Topics     Alcohol use: Yes     If you drink alcohol do you typically have >3 drinks per day or >7 drinks per week? No                     Reviewed orders with patient.  Reviewed health maintenance and updated orders accordingly - Yes  [facility]       3/7/2022     9:58 AM   Breast CA Risk Assessment (FHS-7)   Did any of your first-degree relatives have breast or ovarian cancer? No   Did any of your relatives have bilateral breast cancer? No   Did any man in your family have breast cancer? No   Did any woman in your family have breast and  ovarian cancer? No   Did any woman in your family have breast cancer before age 50 y? No   Do you have 2 or more relatives with breast and/or ovarian cancer? No   Do you have 2 or more relatives with breast and/or bowel cancer? No       Mammogram Screening: Recommended annual mammography  Pertinent mammograms are reviewed under the imaging tab.    Pertinent mammograms are reviewed under the imaging tab.  History of abnormal Pap smear: NO - age 30-65 PAP every 5 years with negative HPV co-testing recommended      Latest Ref Rng & Units 2018    11:12 AM 2018    11:00 AM 2014    12:00 AM   PAP / HPV   PAP (Historical)  NIL    NIL     HPV 16 DNA NEG^Negative  Negative      HPV 18 DNA NEG^Negative  Negative      Other HR HPV NEG^Negative  Negative        Reviewed and updated as needed this visit by clinical staff   Tobacco  Allergies               Reviewed and updated as needed this visit by Provider                 Past Medical History:   Diagnosis Date     HSV (herpes simplex virus) infection      Hyperlipidemia      Hypertension       Past Surgical History:   Procedure Laterality Date      SECTION N/A 2015    Procedure:  SECTION;  Surgeon: Meredith Clark MD;  [facility] L[facility]     COLONOSCOPY N/A 2022    Procedure: COLONOSCOPY, WITH POLYPECTOMY;  Surgeon: Madelyn Bishop MD;  Location: [facility] OR     IMPLANT STIMULATOR SACRAL NERVE STAGE ONE N/A 3/1/2021    Procedure: INSERTION, SACRAL NERVE STIMULATOR, STAGE 1;  Surgeon: Eyad Hawk MD;  Location: [facility] OR     IMPLANT STIMULATOR SACRAL NERVE STAGE TWO N/A 3/15/2021    Procedure: INSERTION, SACRAL NERVE STIMULATOR, STAGE 2 Pleaseschedule 2 weeks after stage 1 implant;  Surgeon: Eyad Hawk MD;  Location: [facility] OR     wisdom teeth             ROS:  Review of Systems     Constitutional:  Negative for fever, chills, weight loss, weight gain, fatigue, decreased appetite, night sweats, recent stressors,  height gain, height loss, post-operative complications, incisional pain, hallucinations, increased energy, hyperactivity and confused.   HENT:  Negative for ear pain, hearing loss, tinnitus, nosebleeds, trouble swallowing, hoarse voice, mouth sores, sore throat, ear discharge, tooth pain, gum tenderness, taste disturbance, smell disturbance, hearing aid, bleeding gums, dry mouth, sinus pain, sinus congestion and neck mass.    Eyes:  Negative for double vision, pain, redness, eye pain, decreased vision, eye watering, eye bulging, eye dryness, flashing lights, spots, floaters, strabismus, tunnel vision, jaundice and eye irritation.   Respiratory:   Negative for cough, hemoptysis, sputum production, shortness of breath, wheezing, sleep disturbances due to breathing, snores loudly, respiratory pain, dyspnea on exertion, cough disturbing sleep and postural dyspnea.    Cardiovascular:  Negative for chest pain, dyspnea on exertion, palpitations, orthopnea, claudication, leg swelling, fingers/toes turn blue, hypertension, hypotension, syncope, history of heart murmur, chest pain on exertion, chest pain at rest, pacemaker, few scattered varicosities, leg pain, sleep disturbances due to breathing, tachycardia, light-headedness, exercise intolerance and edema.   Gastrointestinal:  Negative for heartburn, nausea, vomiting, abdominal pain, diarrhea, constipation, blood in stool, melena, rectal pain, bloating, hemorrhoids, bowel incontinence, jaundice, rectal bleeding, coffee ground emesis and change in stool.   Genitourinary:  Negative for bladder incontinence, dysuria, urgency, hematuria, flank pain, vaginal discharge, difficulty urinating, genital sores, dyspareunia, decreased libido, nocturia, voiding less frequently, arousal difficulty, abnormal vaginal bleeding, excessive menstruation, menstrual changes, hot flashes, vaginal dryness and postmenopausal bleeding.   Musculoskeletal:  Negative for myalgias, back pain, joint  "swelling, arthralgias, stiffness, muscle cramps, neck pain, bone pain, muscle weakness and fracture.   Skin:  Negative for nail changes, itching, poor wound healing, rash, hair changes, skin changes, acne, warts, poor wound healing, scarring, flaky skin, Raynaud's phenomenon, sensitivity to sunlight and skin thickening.   Neurological:  Negative for dizziness, tingling, tremors, speech change, seizures, loss of consciousness, weakness, light-headedness, numbness, headaches, disturbances in coordination, extremity numbness, memory loss, difficulty walking and paralysis.   Endo/Heme:  Negative for anemia, swollen glands and bruises/bleeds easily.   Psychiatric/Behavioral:  Negative for depression, hallucinations, memory loss, decreased concentration, mood swings and panic attacks.    Breast:  Negative for breast discharge, breast mass, breast pain and nipple retraction.   Endocrine:  Negative for altered temperature regulation, polyphagia, polydipsia, unwanted hair growth and change in facial hair.        OBJECTIVE:   /81   Pulse 99   Ht 1.702 m (5' 7.01\")   Wt 98.4 kg (217 lb)   BMI 33.98 kg/m    EXAM:  GENERAL: healthy, alert and no distress  EYES: Eyes grossly normal to inspection, PERRL and conjunctivae and sclerae normal  RESP: lungs clear to auscultation - no rales, rhonchi or wheezes  CV: regular rate and rhythm, normal S1 S2, no S3 or S4, no murmur, click or rub, no peripheral edema and peripheral pulses strong  ABDOMEN: soft, nontender and bowel sounds normal   (female): normal female external genitalia, normal urethral meatus , vaginal mucosa pink, moist, well rugated and normal cervix  MS: no gross musculoskeletal defects noted, no edema  SKIN: no suspicious lesions or rashes  NEURO: Normal strength and tone, mentation intact and speech normal  PSYCH: mentation appears normal, affect normal/bright        ASSESSMENT/PLAN:       ICD-10-CM    1. Routine general medical examination at a [facility] " "facility  Z00.00 Ferritin     Obtaining, preparing and conveyance of cervical or vaginal smear to laboratory.     Pap thin layer screen with HPV - recommended age 30 - 65 years     MA Screening Digital Bilateral      2. Essential hypertension, benign  I10 enalapril (VASOTEC) 20 MG tablet     Basic Metabolic Panel     CBC with platelets      3. Hyperlipidemia LDL goal <100  E78.5 atorvastatin (LIPITOR) 20 MG tablet     Lipid Profile          Patient has been advised of split billing requirements and indicates understanding: Yes  COUNSELING:   Reviewed preventive health counseling, as reflected in patient instructions       Regular exercise       Healthy diet/nutrition    Estimated body mass index is 33.98 kg/m  as calculated from the following:    Height as of this encounter: 1.702 m (5' 7.01\").    Weight as of this encounter: 98.4 kg (217 lb).        She reports that she has never smoked. She quit smokeless tobacco use about 8 years ago.      Counseling Resources:  ATP IV Guidelines  Pooled Cohorts Equation Calculator  Breast Cancer Risk Calculator  BRCA-Related Cancer Risk Assessment: FHS-7 Tool  FRAX Risk Assessment  ICSI Preventive Guidelines  Dietary Guidelines for Americans, 2010  [employer]'s MyPlate  ASA Prophylaxis  Lung CA Screening    Nadira Colon MD  [facility] [place]    "

## 2023-04-24 NOTE — PROGRESS NOTES
Chief Complaint   Patient presents with     Physical     Annual exam   Crissy Dozier LPN  Answers for HPI/ROS submitted by the patient on 4/23/2023  General Symptoms: No  Skin Symptoms: No  HENT Symptoms: No  EYE SYMPTOMS: No  HEART SYMPTOMS: No  LUNG SYMPTOMS: No  INTESTINAL SYMPTOMS: No  URINARY SYMPTOMS: No  GYNECOLOGIC SYMPTOMS: No  BREAST SYMPTOMS: No  SKELETAL SYMPTOMS: No  BLOOD SYMPTOMS: No  NERVOUS SYSTEM SYMPTOMS: No  MENTAL HEALTH SYMPTOMS: No

## 2023-04-24 NOTE — PATIENT INSTRUCTIONS
Thank you for trusting us with your care!     If you need to contact us for questions about:    Symptoms, Scheduling & Medical Questions: Non-urgent (2-3 day response) Louis message, Urgent (needing response today) [phone] (if after 3:30pm next day response)   Prescriptions: Please call your Pharmacy   Billing: [place] [phone] or [facility]:[phone]

## 2023-04-24 NOTE — LETTER
4/24/2023       RE: Adriana Lucia  [address] [facility] [address]     Dear Colleague,    Thank you for referring your patient, Adriana Lucia, to the [facility] [place] [facility]. Please see a copy of my visit note below.    Chief Complaint   Patient presents with     Physical     Annual exam   Crissy Dozier LPN  Answers for HPI/ROS submitted by the patient on 4/23/2023  General Symptoms: No  Skin Symptoms: No  HENT Symptoms: No  EYE SYMPTOMS: No  HEART SYMPTOMS: No  LUNG SYMPTOMS: No  INTESTINAL SYMPTOMS: No  URINARY SYMPTOMS: No  GYNECOLOGIC SYMPTOMS: No  BREAST SYMPTOMS: No  SKELETAL SYMPTOMS: No  BLOOD SYMPTOMS: No  NERVOUS SYSTEM SYMPTOMS: No  MENTAL HEALTH SYMPTOMS: No         SUBJECTIVE:   CC: Adriana Lucia is an 52 year old woman who presents for preventive health visit.       Patient has been advised of split billing requirements and indicates understanding: Yes  Healthy Habits:    Do you get at least three servings of calcium containing foods daily (dairy, green leafy vegetables, etc.)? yes    Amount of exercise or daily activities, outside of work: recent foot fracture, walking the dog currently    Problems taking medications regularly No    Medication side effects: No    Have you had an eye exam in the past two years? yes    Do you see a dentist twice per year? yes    Do you have sleep apnea, excessive snoring or daytime drowsiness?no      -------------------------------------    Today's PHQ-2 Score:     4/23/2023     8:49 AM 7/27/2022     4:02 PM   PHQ-2 ( 1999 Pf[employer])   Q1: Little interest or pleasure in doing things 0 0   Q2: Feeling down, depressed or hopeless 0 0   PHQ-2 Score 0 0   Q1: Little interest or pleasure in doing things Not at all Not at all   Q2: Feeling down, depressed or hopeless Not at all Not at all   PHQ-2 Score 0 0       Abuse: Current or Past(Physical, Sexual or  Emotional)- No  Do you feel safe in your environment? Yes        Social History     Tobacco Use     Smoking status: Never     Smokeless tobacco: Former     Quit date: 2014   Vaping Use     Vaping status: Not on file   Substance Use Topics     Alcohol use: Yes     If you drink alcohol do you typically have >3 drinks per day or >7 drinks per week? No                     Reviewed orders with patient.  Reviewed health maintenance and updated orders accordingly - Yes  Labs reviewed in [facility]:       3/7/2022     9:58 AM   Breast CA Risk Assessment (FHS-)   Did any of your first-degree relatives have breast or ovarian cancer? No   Did any of your relatives have bilateral breast cancer? No   Did any man in your family have breast cancer? No   Did any woman in your family have breast and ovarian cancer? No   Did any woman in your family have breast cancer before age 50 y? No   Do you have 2 or more relatives with breast and/or ovarian cancer? No   Do you have 2 or more relatives with breast and/or bowel cancer? No       Mammogram Screening: Recommended annual mammography  Pertinent mammograms are reviewed under the imaging tab.    Pertinent mammograms are reviewed under the imaging tab.  History of abnormal Pap smear: NO - age 30-65 PAP every 5 years with negative HPV co-testing recommended      Latest Ref Rng & Units 2018    11:12 AM 2018    11:00 AM 2014    12:00 AM   PAP / HPV   PAP (Historical)  NIL    NIL     HPV 16 DNA NEG^Negative  Negative      HPV 18 DNA NEG^Negative  Negative      Other HR HPV NEG^Negative  Negative        Reviewed and updated as needed this visit by clinical staff   Tobacco  Allergies               Reviewed and updated as needed this visit by Provider                 Past Medical History:   Diagnosis Date     HSV (herpes simplex virus) infection      Hyperlipidemia      Hypertension       Past Surgical History:   Procedure Laterality Date      SECTION N/A  2015    Procedure:  SECTION;  Surgeon: Meredith Clark MD;  [facility]     COLONOSCOPY N/A 2022    Procedure: COLONOSCOPY, WITH POLYPECTOMY;  Surgeon: Madelyn Bishop MD;  Location: [facility] OR     IMPLANT STIMULATOR SACRAL NERVE STAGE ONE N/A 3/1/2021    Procedure: INSERTION, SACRAL NERVE STIMULATOR, STAGE 1;  Surgeon: Eyad Hawk MD;  Location: [facility] OR     IMPLANT STIMULATOR SACRAL NERVE STAGE TWO N/A 3/15/2021    Procedure: INSERTION, SACRAL NERVE STIMULATOR, STAGE 2 Pleaseschedule 2 weeks after stage 1 implant;  Surgeon: Eyad Hawk MD;  Location: [facility] OR     wisdom teeth             ROS:  Review of Systems     Constitutional:  Negative for fever, chills, weight loss, weight gain, fatigue, decreased appetite, night sweats, recent stressors, height gain, height loss, post-operative complications, incisional pain, hallucinations, increased energy, hyperactivity and confused.   HENT:  Negative for ear pain, hearing loss, tinnitus, nosebleeds, trouble swallowing, hoarse voice, mouth sores, sore throat, ear discharge, tooth pain, gum tenderness, taste disturbance, smell disturbance, hearing aid, bleeding gums, dry mouth, sinus pain, sinus congestion and neck mass.    Eyes:  Negative for double vision, pain, redness, eye pain, decreased vision, eye watering, eye bulging, eye dryness, flashing lights, spots, floaters, strabismus, tunnel vision, jaundice and eye irritation.   Respiratory:   Negative for cough, hemoptysis, sputum production, shortness of breath, wheezing, sleep disturbances due to breathing, snores loudly, respiratory pain, dyspnea on exertion, cough disturbing sleep and postural dyspnea.    Cardiovascular:  Negative for chest pain, dyspnea on exertion, palpitations, orthopnea, claudication, leg swelling, fingers/toes turn blue, hypertension, hypotension, syncope, history of heart murmur, chest pain on exertion, chest pain at rest, pacemaker, few  "scattered varicosities, leg pain, sleep disturbances due to breathing, tachycardia, light-headedness, exercise intolerance and edema.   Gastrointestinal:  Negative for heartburn, nausea, vomiting, abdominal pain, diarrhea, constipation, blood in stool, melena, rectal pain, bloating, hemorrhoids, bowel incontinence, jaundice, rectal bleeding, coffee ground emesis and change in stool.   Genitourinary:  Negative for bladder incontinence, dysuria, urgency, hematuria, flank pain, vaginal discharge, difficulty urinating, genital sores, dyspareunia, decreased libido, nocturia, voiding less frequently, arousal difficulty, abnormal vaginal bleeding, excessive menstruation, menstrual changes, hot flashes, vaginal dryness and postmenopausal bleeding.   Musculoskeletal:  Negative for myalgias, back pain, joint swelling, arthralgias, stiffness, muscle cramps, neck pain, bone pain, muscle weakness and fracture.   Skin:  Negative for nail changes, itching, poor wound healing, rash, hair changes, skin changes, acne, warts, poor wound healing, scarring, flaky skin, Raynaud's phenomenon, sensitivity to sunlight and skin thickening.   Neurological:  Negative for dizziness, tingling, tremors, speech change, seizures, loss of consciousness, weakness, light-headedness, numbness, headaches, disturbances in coordination, extremity numbness, memory loss, difficulty walking and paralysis.   Endo/Heme:  Negative for anemia, swollen glands and bruises/bleeds easily.   Psychiatric/Behavioral:  Negative for depression, hallucinations, memory loss, decreased concentration, mood swings and panic attacks.    Breast:  Negative for breast discharge, breast mass, breast pain and nipple retraction.   Endocrine:  Negative for altered temperature regulation, polyphagia, polydipsia, unwanted hair growth and change in facial hair.        OBJECTIVE:   /81   Pulse 99   Ht 1.702 m (5' 7.01\")   Wt 98.4 kg (217 lb)   BMI 33.98 kg/m    EXAM:  GENERAL: " "healthy, alert and no distress  EYES: Eyes grossly normal to inspection, PERRL and conjunctivae and sclerae normal  RESP: lungs clear to auscultation - no rales, rhonchi or wheezes  CV: regular rate and rhythm, normal S1 S2, no S3 or S4, no murmur, click or rub, no peripheral edema and peripheral pulses strong  ABDOMEN: soft, nontender and bowel sounds normal   (female): normal female external genitalia, normal urethral meatus , vaginal mucosa pink, moist, well rugated and normal cervix  MS: no gross musculoskeletal defects noted, no edema  SKIN: no suspicious lesions or rashes  NEURO: Normal strength and tone, mentation intact and speech normal  PSYCH: mentation appears normal, affect normal/bright        ASSESSMENT/PLAN:       ICD-10-CM    1. Routine general medical examination at a health care facility  Z00.00 Ferritin     Obtaining, preparing and conveyance of cervical or vaginal smear to laboratory.     Pap thin layer screen with HPV - recommended age 30 - 65 years     MA Screening Digital Bilateral      2. Essential hypertension, benign  I10 enalapril (VASOTEC) 20 MG tablet     Basic Metabolic Panel     CBC with platelets      3. Hyperlipidemia LDL goal <100  E78.5 atorvastatin (LIPITOR) 20 MG tablet     Lipid Profile          Patient has been advised of split billing requirements and indicates understanding: Yes  COUNSELING:   Reviewed preventive health counseling, as reflected in patient instructions       Regular exercise       Healthy diet/nutrition    Estimated body mass index is 33.98 kg/m  as calculated from the following:    Height as of this encounter: 1.702 m (5' 7.01\").    Weight as of this encounter: 98.4 kg (217 lb).        She reports that she has never smoked. She quit smokeless tobacco use about 8 years ago.      Counseling Resources:  ATP IV Guidelines  Pooled Cohorts Equation Calculator  Breast Cancer Risk Calculator  BRCA-Related Cancer Risk Assessment: FHS-7 Tool  FRAX Risk " Assessment  ICSI Preventive Guidelines  Dietary Guidelines for Americans, 2010  USDA's MyPlate  ASA Prophylaxis  Lung CA Screening    Nadira Colon MD  [facility]        Again, thank you for allowing me to participate in the care of your patient.      Sincerely,    Nadira Colon MD

## 2023-04-26 LAB
BKR LAB AP GYN ADEQUACY: NORMAL
BKR LAB AP GYN INTERPRETATION: NORMAL
BKR LAB AP HPV REFLEX: NORMAL
BKR LAB AP PREVIOUS ABNORMAL: NORMAL
PATH REPORT.COMMENTS IMP SPEC: NORMAL
PATH REPORT.COMMENTS IMP SPEC: NORMAL
PATH REPORT.RELEVANT HX SPEC: NORMAL

## 2023-04-27 LAB
HUMAN PAPILLOMA VIRUS 16 DNA: NEGATIVE
HUMAN PAPILLOMA VIRUS 18 DNA: NEGATIVE
HUMAN PAPILLOMA VIRUS FINAL DIAGNOSIS: NORMAL
HUMAN PAPILLOMA VIRUS OTHER HR: NEGATIVE

## 2023-05-03 ENCOUNTER — TELEPHONE (OUTPATIENT)
Dept: OBGYN | Facility: CLINIC | Age: 53
End: 2023-05-03
Payer: COMMERCIAL

## 2023-05-03 ENCOUNTER — TELEPHONE (OUTPATIENT)
Dept: UROLOGY | Facility: CLINIC | Age: 53
End: 2023-05-03
Payer: COMMERCIAL

## 2023-05-03 DIAGNOSIS — N32.81 OVERACTIVE BLADDER: Primary | ICD-10-CM

## 2023-05-03 NOTE — TELEPHONE ENCOUNTER
Left patient this message-       ----- Message from Eyad Hawk MD sent at 5/3/2023  8:24 AM CDT -----  Regarding: RE: mybetriq  Shekhar Pichardo,    Please tell Adriana that I switched her to Vibegron. I sent the prescription to [facility]  ----- Message -----  From: Kylee Johnson LPN  Sent: 5/2/2023   4:17 PM CDT  To: Eyad Hawk MD  Subject: mybetriq                                         This patient still takes this medication every day-  she has new insurance and it will not cover this medication-  patient hasn't been on anything else.  Prior authorization was denied.  Would you give her an RX for another medication?  kylee

## 2023-05-08 ENCOUNTER — VIRTUAL VISIT (OUTPATIENT)
Dept: ENDOCRINOLOGY | Facility: CLINIC | Age: 53
End: 2023-05-08
Payer: COMMERCIAL

## 2023-05-08 VITALS — HEIGHT: 67 IN | BODY MASS INDEX: 33.59 KG/M2 | WEIGHT: 214 LBS

## 2023-05-08 DIAGNOSIS — E66.812 OBESITY, CLASS II, BMI 35-39.9: ICD-10-CM

## 2023-05-08 PROCEDURE — 99213 OFFICE O/P EST LOW 20 MIN: CPT | Mod: VID | Performed by: INTERNAL MEDICINE

## 2023-05-08 ASSESSMENT — PAIN SCALES - GENERAL: PAINLEVEL: NO PAIN (0)

## 2023-05-08 NOTE — NURSING NOTE
"Chief Complaint   Patient presents with     RECHECK     Adriana, is participating in a video visit today for a follow up no new concerns at this time, as reported by patient.       Vitals:    05/08/23 0800   Weight: 97.1 kg (214 lb)   Height: 1.702 m (5' 7.01\")       Body mass index is 33.51 kg/m .      Josephine Pardo LPN    "

## 2023-05-08 NOTE — TELEPHONE ENCOUNTER
Medication Question or Refill    Contacts       Type Contact Phone/Fax    05/08/2023 03:12 PM CDT Phone (Incoming) [id number] - [place], [place] - [address] [facility] (Pharmacy) [phone]          What medication are you calling about (include dose and sig)?:     liraglutide - Weight Management (SAXENDA) 18 MG/3ML pen    Preferred Pharmacy:  [facility][id number] - [facility] [address] [address]  Phone: [phone] Fax: [phone]      Controlled Substance Agreement on file:   CSA -- Patient Level:    CSA: None found at the patient level.       Who prescribed the medication?: Allyssa      Do you have any questions or concerns?  Yes:     >> pt has new insurance, so pharm needs new prior auth or new medication.

## 2023-05-08 NOTE — PROGRESS NOTES
"    Return Medical Weight Management Note     Adriana Lucia  MRN:  [id number]  :  1970  SIMA:  23    Dear Nadira Colon MD,    I had the pleasure of seeing your patient Adriana Lucia.  She is a 52 year old female who I am continuing to see for treatment of obesity related to:      2022     9:20 AM   --   I have the following health issues associated with obesity High Blood Pressure    High Cholesterol   I have the following symptoms associated with obesity None of the above     CURRENT WEIGHT:   214 lbs 0 oz    Wt Readings from Last 4 Encounters:   23 97.1 kg (214 lb)   23 98.4 kg (217 lb)   23 108.9 kg (240 lb)   22 106.6 kg (235 lb)     Height:  5' 7.01\"  Body Mass Index:  Body mass index is 33.51 kg/m .  Vitals:  B/P: Data Unavailable, P: Data Unavailable    Initial consult weight was 235 on 22.  Weight change since last seen on 2023 is down 26 pounds.   Total loss is 21 pounds.    INTERVAL HISTORY:  Saxenda has reducdd eating by \"smaller portions\". Had side effects at first but now have mostly gone.        2022     9:20 AM   Diet Recall Review with Patient   Do you typically eat breakfast? Yes   If you do eat breakfast, what types of food do you eat? Wheat toast with pranut butter   Do you typically eat lunch? Yes   If you do eat lunch, what types of food do you typically eat? [place], or protein bar, or yogurr   Do you typically eat supper? Yes   If you do eat supper, what types of food do you typically eat? Protein, salad or vegetable and a starch   Do you typically eat snacks? No   Do you like vegetables?  Yes   Do you drink water? Yes   How many glasses of juice do you drink in a typical day? 0   How many of glasses of milk do you drink in a typical day? 0   If you do drink milk, what type? N/A   How many 8oz glasses of sugar containing drinks such as Anselmo-Aid/sweet tea do you drink in a day? 0   How many cans/bottles of sugar " pop/soda/tea/sports drinks do you drink in a day? 0   How many cans/bottles of diet pop/soda/tea or sports drink do you drink in a day? 0   How often do you have a drink of alcohol? 2-4 Times a Month   If you do drink, how many drinks might you have in a day? 3-4     MEDICATIONS:   Current Outpatient Medications   Medication     atorvastatin (LIPITOR) 20 MG tablet     enalapril (VASOTEC) 20 MG tablet     insulin pen needle (31G X 5 MM) 31G X 5 MM miscellaneous     liraglutide - Weight Management (SAXENDA) 18 MG/3ML pen     mirabegron (MYRBETRIQ) 50 MG 24 hr tablet     SUMAtriptan (IMITREX) 50 MG tablet     vibegron (GEMTESA) 75 MG TABS tablet     VITAMIN D, CHOLECALCIFEROL, PO     liraglutide - Weight Management (SAXENDA) 18 MG/3ML pen     No current facility-administered medications for this visit.         5/8/2023     7:32 AM   Weight Loss Medication History Reviewed With Patient   Which weight loss medications are you currently taking on a regular basis? Saxenda (liraglutide)     ASSESSMENT:   Maintain Saxenda in support of food change - focus on no between meal snacking, aggressive lowering of starches and cheese    FOLLOW-UP:    12 weeks.    Sincerely,  Warren Spivey MD

## 2023-05-08 NOTE — PROGRESS NOTES
Adriana is a 52 year old who is being evaluated via a billable video visit.      How would you like to obtain your AVS? Tiltaphart  If the video visit is dropped, the invitation should be resent by: Text to cell phone: [phone]  Will anyone else be joining your video visit? No    Video-Visit Details    Joined the call at 5/8/2023, 8:04:57 am.  Left the call at 5/8/2023, 8:08:51 am.    Originating Location (pt. Location): Home  Distant Location (provider location):  Off-site  Platform used for Video Visit: [employer]

## 2023-05-08 NOTE — TELEPHONE ENCOUNTER
liraglutide - Weight Management (SAXENDA) 18 MG/3ML pen    Pt has new insurance.  Needs new med or a Prior Authorization for this med.  Please review       Deepti Ceja RN  Central Triage Red Flags/Med Refills

## 2023-05-08 NOTE — LETTER
"2023       RE: Adriana Lucia  [address] [facility] [place] [address]     Dear Colleague,    Thank you for referring your patient, Adriana Lucia, to the [facility] WEIGHT MANAGEMENT CLINIC [place] [facility]. Please see a copy of my visit note below.    Adriana is a 52 year old who is being evaluated via a billable video visit.      How would you like to obtain your AVS? MyChart  If the video visit is dropped, the invitation should be resent by: Text to cell phone: [phone]  Will anyone else be joining your video visit? No    Video-Visit Details    Joined the call at 2023, 8:04:57 am.  Left the call at 2023, 8:08:51 am.    Originating Location (pt. Location): Home  Distant Location (provider location):  Off-site  Platform used for Video Visit: [facility] Medical Weight Management Note     Adriana Lucia  MRN:  [id number]  :  1970  SIMA:  23    Dear Nadira Colon MD,    I had the pleasure of seeing your patient Adriana Lucia.  She is a 52 year old female who I am continuing to see for treatment of obesity related to:      2022     9:20 AM   --   I have the following health issues associated with obesity High Blood Pressure    High Cholesterol   I have the following symptoms associated with obesity None of the above     CURRENT WEIGHT:   214 lbs 0 oz    Wt Readings from Last 4 Encounters:   23 97.1 kg (214 lb)   23 98.4 kg (217 lb)   23 108.9 kg (240 lb)   22 106.6 kg (235 lb)     Height:  5' 7.01\"  Body Mass Index:  Body mass index is 33.51 kg/m .  Vitals:  B/P: Data Unavailable, P: Data Unavailable    Initial consult weight was 235 on 22.  Weight change since last seen on 2023 is down 26 pounds.   Total loss is 21 pounds.    INTERVAL HISTORY:  Saxenda has reducdd eating by \"smaller portions\". Had side effects at first but now have mostly " gone.        9/8/2022     9:20 AM   Diet Recall Review with Patient   Do you typically eat breakfast? Yes   If you do eat breakfast, what types of food do you eat? Wheat toast with pranut butter   Do you typically eat lunch? Yes   If you do eat lunch, what types of food do you typically eat? [place], or protein bar, or yogurr   Do you typically eat supper? Yes   If you do eat supper, what types of food do you typically eat? Protein, salad or vegetable and a starch   Do you typically eat snacks? No   Do you like vegetables?  Yes   Do you drink water? Yes   How many glasses of juice do you drink in a typical day? 0   How many of glasses of milk do you drink in a typical day? 0   If you do drink milk, what type? N/A   How many 8oz glasses of sugar containing drinks such as Anselmo-Aid/sweet tea do you drink in a day? 0   How many cans/bottles of sugar pop/soda/tea/sports drinks do you drink in a day? 0   How many cans/bottles of diet pop/soda/tea or sports drink do you drink in a day? 0   How often do you have a drink of alcohol? 2-4 Times a Month   If you do drink, how many drinks might you have in a day? 3-4     MEDICATIONS:   Current Outpatient Medications   Medication    atorvastatin (LIPITOR) 20 MG tablet    enalapril (VASOTEC) 20 MG tablet    insulin pen needle (31G X 5 MM) 31G X 5 MM miscellaneous    liraglutide - Weight Management (SAXENDA) 18 MG/3ML pen    mirabegron (MYRBETRIQ) 50 MG 24 hr tablet    SUMAtriptan (IMITREX) 50 MG tablet    vibegron (GEMTESA) 75 MG TABS tablet    VITAMIN D, CHOLECALCIFEROL, PO    liraglutide - Weight Management (SAXENDA) 18 MG/3ML pen     No current facility-administered medications for this visit.         5/8/2023     7:32 AM   Weight Loss Medication History Reviewed With Patient   Which weight loss medications are you currently taking on a regular basis? Saxenda (liraglutide)     ASSESSMENT:   Maintain Saxenda in support of food change - focus on no between meal snacking,  aggressive lowering of starches and cheese    FOLLOW-UP:    12 weeks.    Sincerely,  Warren Spivey MD

## 2023-05-17 ENCOUNTER — TELEPHONE (OUTPATIENT)
Dept: ENDOCRINOLOGY | Facility: CLINIC | Age: 53
End: 2023-05-17
Payer: COMMERCIAL

## 2023-05-17 NOTE — TELEPHONE ENCOUNTER
PA Initiation    Medication: SAXENDA 18 MG/3ML SC SOPN  Insurance Company: [employer] - Phone [phone] Fax [phone]  Pharmacy Filling the Rx:    Filling Pharmacy Phone:    Filling Pharmacy Fax:    Start Date:      Key: [id number]

## 2023-05-17 NOTE — TELEPHONE ENCOUNTER
"Prior Authorization Retail Medication Request    Medication/Dose: Saxenda  ICD code (if different than what is on RX): Obesity, Class II, BMI 35-39.9 [E66.9]      Previously Tried and Failed:  Diet, exercise  Rationale:  Height:  5' 7.01\"  Body Mass Index:  Body mass index is 33.51 kg/m .    05/08/23 97.1 kg (214 lb)   04/24/23 98.4 kg (217 lb)   01/30/23 108.9 kg (240 lb)   09/12/22 106.6 kg (235 lb)     Weight change since last seen on 1/30/2023 is down 26 pounds.     High Blood Pressure    High Cholesterol    Insurance Name:    Insurance ID:        Pharmacy Information (if different than what is on RX)  Name:  [id number] - [address]  Phone:  [phone]    "

## 2023-05-17 NOTE — TELEPHONE ENCOUNTER
Prior Authorization Approval    Medication: SAXENDA 18 MG/3ML SC SOPN  Authorization Effective Date: 5/17/2023  Authorization Expiration Date: 5/17/2024  Approved Dose/Quantity: 15ml/30 days   Reference #: [id number]   Insurance Company: [employer] - Phone [phone] Fax [phone]  Expected CoPay: 30.00     CoPay Card Available:      Financial Assistance Needed: no  Which Pharmacy is filling the prescription:    Pharmacy Notified:    Patient Notified:

## 2023-05-23 ENCOUNTER — OFFICE VISIT (OUTPATIENT)
Dept: SURGERY | Facility: CLINIC | Age: 53
End: 2023-05-23
Payer: COMMERCIAL

## 2023-05-23 VITALS
OXYGEN SATURATION: 99 % | DIASTOLIC BLOOD PRESSURE: 80 MMHG | BODY MASS INDEX: 32.94 KG/M2 | HEIGHT: 67 IN | SYSTOLIC BLOOD PRESSURE: 121 MMHG | WEIGHT: 209.9 LBS | HEART RATE: 95 BPM

## 2023-05-23 DIAGNOSIS — K42.9 UMBILICAL HERNIA WITHOUT OBSTRUCTION AND WITHOUT GANGRENE: Primary | ICD-10-CM

## 2023-05-23 PROCEDURE — 99213 OFFICE O/P EST LOW 20 MIN: CPT | Performed by: SURGERY

## 2023-05-23 RX ORDER — CEFAZOLIN SODIUM 2 G/50ML
2 SOLUTION INTRAVENOUS SEE ADMIN INSTRUCTIONS
Status: CANCELLED | OUTPATIENT
Start: 2023-05-23

## 2023-05-23 RX ORDER — CEFAZOLIN SODIUM 2 G/50ML
2 SOLUTION INTRAVENOUS
Status: CANCELLED | OUTPATIENT
Start: 2023-05-23

## 2023-05-23 ASSESSMENT — PAIN SCALES - GENERAL: PAINLEVEL: NO PAIN (0)

## 2023-05-23 NOTE — LETTER
5/23/2023       RE: Adriana Lucia  [address] [facility] [address]       Dear Colleague,    Thank you for referring your patient, Adriana Lucia, to the [facility] GENERAL SURGERY CLINIC [place] at [facility]. Please see a copy of my visit note below.    Patient know to me from previous visit.  Has continued to lose weight to goal.  Ready to discuss surgical options.  PHYSICAL EXAM  General appearance- healthy, alert, and in no distress.  Weight down to 209 lbs.  Abdomen - soft non distended, non tender with umbilical hernia    Impression:  Hernia, umbilical  Recommendation:  open mesh repair surgery under MAC.    A full discussion regarding the alternatives, risks, goals, and potential complications for this surgery was completed today.  The patient understood that the potential problems included but are not limited to:  Infection, bleeding, hematoma, seroma, and recurrence.    The patient verbally expressed understanding, was given the opportunity for questions, and gives full informed consent for the procedure.      The total time spent with this patient was 20 minutes.  The total time was spent on the date of encounter doing chart review, history and physical, dressing changes, documentation, patient education, and any further activity as noted above.              Again, thank you for allowing me to participate in the care of your patient.      Sincerely,    Rei Louis MD

## 2023-05-23 NOTE — PATIENT INSTRUCTIONS
You met with Dr. Rei Louis.      Today's visit instructions:    Reminder:  Surgery Requirements  Your surgery will be at [facility] [place]- 5th Floor  You will need to arrive 1.5 - 2 hours early based on the location of your surgery ([facility] 1.5 hours, [facility]  2 hours).  You will need someone to drive you home (over 18 years old) and stay with you for 24 hours after the procedure.  You will need a preop physical with your regular doctor (or PAC if requested by your surgeon) within 30 days of surgery- closer is always better.  Stop any blood thinners, vitamins, minerals, or herbal supplements 5 days before surgery.  If you are taking a prescribed blood thinner please let us know for specific instructions.  Fasting- a nurse from [facility] will call you 1-2 days before surgery to confirm your procedure and tell you when to stop eating and drinking.   Wash with the soap (Antibacterial, Dial Complete Foam, Hibiclense, or soap given/mailed from the clinic) the night before surgery and morning of surgery. See instructions in the Surgery Packet.  If you would like a procedure estimate please call Cost of Care at [phone].    If you have questions please contact Imani RN or Maile RN during regular clinic hours, Monday through Friday 7:30 AM - 4:00 PM, or you can contact us via [employer] at anytime.       If you have urgent needs after-hours, weekends, or holidays please call the hospital at [phone] and ask to speak with our on-call General Surgery Team.    Appointment [phone]  Nurse Advice (Imani or Maile): [phone]   Surgery Scheduler (Karishma): [phone]  Fax: [phone]    After Your Open Ventral/Incisional/Umbilical Hernia Repair          Incision care   You may take a shower the day after surgery. Carefully wash your incision with soap and water. Do not submerge yourself in water (bath, whirlpool, hot tub, pool, lake) for 14 days after surgery.  Always wash your hands before touching your incisions or removing bandages.  Remove the bandage the day after surgery, but leave the medical tape (Steri-Strips) or glue in place. These will loosen and fall off on their own 1-2 weeks after surgery.     It is not unusual to form a collection of fluid or blood under your incision that may feel firm or squishy- it can take several weeks to months for your body to reabsorb it.  At times, it may even drain.  If that should happen keep the area clean with soap, water,  and cover with a clean gauze dressing. You can change this daily or as needed.      Other medicines   Wait to start aspirin or blood thinners until the day after surgery. You can continue your regular medicines at your normal time the day after surgery.   Your pain medicine may cause constipation (hard, dry stools). To help with this, take the stool softener your doctor gave you or an over-the-counter stool softener or laxative. You can stop taking this when you are no longer taking pain medicine and your bowel movements are back to normal.      For pain or discomfort   Take the narcotic pain medicine your doctor gave you as needed and as instructed on the bottle. If you prefer to use over-the-counter medication, use acetaminophen (Tylenol) or ibuprofen (Advil, Motrin) as instructed on the box. Do not take Tylenol if it is in your narcotic pain medication.   Use an ice pack on your incision (surgical cut) for 20 minutes at a time as needed for the first 24 hours. Be sure to protect your skin by putting a cloth between the ice pack and your skin.   After 24 hours you can switch to heat for 20 minutes as needed. Be sure to protect your skin by putting a cloth between the heat pack and your skin.         Activities   No driving until you feel it s safe to do so. Don t drive while taking narcotic pain medicine.   Don t lift anything heavier than 20 pounds for 3 to 4 weeks after surgery.      Special equipment    If we gave you an abdominal binder, wear it for the first 30 days after surgery. You don t have to wear it when you re sleeping. You can wear it longer than 30 days if you wish.      Diet   You can eat your regular meals after surgery.      When to call the doctor   Call your doctor if you have:   A fever above 101 F (38.3 C) (taken under the tongue), or a fever or chills lasting more than a day.   Redness at the incision site.   Any fluid or blood draining from the incision, especially if it smells bad.    Severe pain that doesn t improve with pain medicine.      We will call you 2 to 4 days after surgery to review this handout, answer questions and help arrange after-surgery care. If you have questions or concerns, please call [phone] during regular office hours. If you need to call after business hours, call [phone] and ask to page the surgeon on-call.    IMPORTANT:  Prior to your surgical procedure, a nurse will be contacting you to obtain a health history.   If they do not reach you by noon the day prior to your surgery, your surgery will be cancelled. If you have your Pre-Op Surgical Physical (H&P) with the Anesthesia Team (PAC), you are exempt from this call. Phone:  [phone] ([facility]) or [phone] ([place]).       Transversus Abdominis Plane (TAP) Pain Block      What is a TAP block?   A TAP block can help you manage your pain after surgery. TAP stands for transversus abdominis plane, which is a muscle layer in your abdomen (belly). The TAP block uses numbing medicine similar to Novocaine to block pain near the site of your surgery.       Why get a TAP block?   To better manage your pain after surgery. A tap block will help keep the pain from getting severe and out of control.   To block pain signals from the nerve, which helps decrease pain after surgery.   To help you sleep, easily breathe deeply, walk and visit with others.      How is it done?   You will lie still on a table. We will use  an ultrasound machine to help us see the correct muscle layer of your abdomen. Then, we ll use a needle to inject the medicine. We may also give you some sleep medicine to lessen the pain of the injection.       The procedure takes between 5 and 15 minutes. It is usually done right before surgery, but will sometimes be after. It depends on your surgery and care needs.      What can I expect?   You may feel numbness, tingling or a heaviness in your abdomen.    You may have pain control up to 72 hours after surgery.   The TAP block may not lessen all of your surgery pain. But most patients feel 50 to 75 percent less pain than without the block.       Tell your nurse if you have:   Numbness or tingling in areas other than where the injection was   Blurry vision   Ringing in your ears   A metallic taste in your mouth

## 2023-05-23 NOTE — NURSING NOTE
"Chief Complaint   Patient presents with     RECHECK     Follow up for umbilical hernia, weight loss       Vitals:    05/23/23 0833   BP: 121/80   BP Location: Left arm   Patient Position: Sitting   Cuff Size: Adult Large   Pulse: 95   SpO2: 99%   Weight: 95.2 kg (209 lb 14.4 oz)   Height: 1.702 m (5' 7\")       Body mass index is 32.87 kg/m .                          Jeff Freeman, EMT    "

## 2023-05-23 NOTE — NURSING NOTE
Pre and Post op Patient Education/Teaching Flowsheet  Relevant Diagnosis:  Umbilical Hernia (K42.0)  Teaching Topic:  Pre and post op teaching  Person(s) Involved in teaching:  Patient     Motivation Level:  Asks Questions:  Yes  Eager to Learn:  Yes  Cooperative:  Yes  Receptive (willing/able to accept information):  Yes  Any cultural factors/Zoroastrianism beliefs that may influence understanding or compliance?  No    Patient/caregiver/family demonstrates understanding of the following:  Reason for the appointment, diagnosis, and treatment plan:  Yes  Patient demonstrates understanding of the following:  Pre-op bowel prep:  No  Post-op pain management recommendations (medications, ice compress, binder/athletic supporter (if applicable), etc.:  Yes  Inguinal hernia patients:  Post-op urinary retention- discussed signs/symptoms and visit to ER for Cruz catheter placement and to stay in place for at least 48 hours:  NA  Restrictions:  Yes  Medications to take the day of surgery:  Per PCP  Blood thinner medications discussed and when to stop (if applicable):  Yes  Wound care:  Yes  Diabetes medication management (if applicable):  Per PCP  Which situations necessitate calling provider and whom to contact:  Discussed how to contact the hospital, nurse, and clinic scheduling staff if necessary      Date and time of surgery:  TBD  Location of surgery: [facility] [place]- 5th Floor  History and Physical and any other testing necessary prior to surgery:  Yes  Required time line for completion of History and Physical and any pre-op testing:  Yes  Discuss need for someone to drive patient home and stay with them for 24 hours:  Yes  Pre-op showering/scrub information with Surgical Scrub:  Yes  NPO Guidelines:  NPO per Anesthesia Guidelines    Infection Prevention: Patient demonstrates understanding of the following:  Patient instructed on hand hygiene:  Yes  Surgical procedure site care will be taught  and will be reviewed at the time of discharge  Signs and symptoms of infection taught:  Yes  Wound care reviewed and will be taught at the time of discharge  Central venous catheter care will be taught at the time of discharge (if applicable)    Post-op follow-up:  Instructional materials used/given/mailed:  Surgical logistics, post op teaching sheet, and surgical scrub

## 2023-05-23 NOTE — PROGRESS NOTES
Patient know to me from previous visit.  Has continued to lose weight to goal.  Ready to discuss surgical options.  PHYSICAL EXAM  General appearance- healthy, alert, and in no distress.  Weight down to 209 lbs.  Abdomen - soft non distended, non tender with umbilical hernia    Impression:  Hernia, umbilical  Recommendation:  open mesh repair surgery under MAC.    A full discussion regarding the alternatives, risks, goals, and potential complications for this surgery was completed today.  The patient understood that the potential problems included but are not limited to:  Infection, bleeding, hematoma, seroma, and recurrence.    The patient verbally expressed understanding, was given the opportunity for questions, and gives full informed consent for the procedure.      The total time spent with this patient was 20 minutes.  The total time was spent on the date of encounter doing chart review, history and physical, dressing changes, documentation, patient education, and any further activity as noted above.

## 2023-05-24 ENCOUNTER — TELEPHONE (OUTPATIENT)
Dept: SURGERY | Facility: CLINIC | Age: 53
End: 2023-05-24
Payer: COMMERCIAL

## 2023-05-24 PROBLEM — K42.9 UMBILICAL HERNIA WITHOUT OBSTRUCTION AND WITHOUT GANGRENE: Status: ACTIVE | Noted: 2023-05-24

## 2023-05-24 NOTE — TELEPHONE ENCOUNTER
Patient is scheduled for surgery with Dr. Louis    Spoke with: Adriana    Date of Surgery: 7/3/2023    Location: [facility]    Informed patient they will need an adult  Yes    Pre op with Provider n/a    H&P: Scheduled with PCP - Nadira Colon MD    Additional imaging/appointments: n/a    Surgery packet: To be sent in the [place] mail     Additional comments: n/a        Karishma Mcconnell on 5/24/2023 at 3:04 PM

## 2023-05-25 DIAGNOSIS — G25.81 RESTLESS LEGS SYNDROME (RLS): ICD-10-CM

## 2023-05-25 RX ORDER — PRAMIPEXOLE DIHYDROCHLORIDE 1 MG/1
TABLET ORAL
Qty: 90 TABLET | Refills: 1 | Status: SHIPPED | OUTPATIENT
Start: 2023-05-25 | End: 2023-11-21

## 2023-05-31 NOTE — PROGRESS NOTES
"Adriana Lucia is a 52 year old female who is being evaluated via a billable video visit.       The patient has been notified of following:      \"This video visit will be conducted via a call between you and your physician/provider. We have found that certain health care needs can be provided without the need for an in-person physical exam.  This service lets us provide the care you need with a video conversation.  If a prescription is necessary we can send it directly to your pharmacy.  If lab work is needed we can place an order for that and you can then stop by our lab to have the test done at a later time.     Video visits are billed at different rates depending on your insurance coverage.  Please reach out to your insurance provider with any questions.     If during the course of the call the physician/provider feels a video visit is not appropriate, you will not be charged for this service.\"     Patient has given verbal consent for Video visit? Yes  How would you like to obtain your AVS? Mail a copy  If you are dropped from the video visit, the video invite should be resent to: Text to cell phone: -  Will anyone else be joining your video visit? No  If patient encounters technical issues they should call [phone]      Video-Visit Details     Type of service:  Video Visit     Start Time: 0900  End Time: 0910    Originating Location (pt. Location): Home     Distant Location (provider location):  Off-site, [facility] [place]       Platform used for Video Visit: [employer]    Virtual visit for annual follow-up of restless leg syndrome managed with pramipexole.     Assessment:  -Restless leg syndrome     Plan:  -Ferritin 135 ng/mL on 4/24/2023  -Prior incomplete response to gabapentin 300-600 mg at bedtime  -Appears to be stable clinically with pramipexole 1 mg taken 1-2 hours before bed.  - We will continue to monitor for gradual increase in effective doses of dopaminergic's that may " "suggest augmentation, but for now no concerns.  -Plan to continue pramipexole on current dose and plan for follow-up in 1 year.    SUBJECTIVE:  Adriana Lucia is a 52 year old female.    Past medical history of restless leg syndrome, hypertension, hyperlipidemia, obesity.     10/19/2020 - Office visit and was her first visit with myself.  In summary, incomplete response to gabapentin 300-600 mg at bedtime.  History overall is consistent with restless leg syndrome.  Normal range ferritin of 73 ng/mL on May 20, 2020.  Plan to taper gabapentin and start trial of pramipexole 0.25-0.75 mg taken 1-2 hours before bed.     11/23/2020 - Today, she presents for follow-up of the trial of pramipexole.  She feels it is gone dramatically better and feels it is like a \"night and day difference\".  She has found the pramipexole at 0.5 mg dose has been highly effective and is not aware of any side effects.  She would like to continue the pramipexole and does not desire any changes.     7/16/2021 -she feels she continues to do very well with the pramipexole, no concerns today, feels she is sleeping well and that the restless leg symptoms are being well controlled.  She is having what sounds like some mild intermittent leg cramping, that she attributes to her atorvastatin.  She did do a short trial of discontinuing atorvastatin and did have improvement in the symptoms, but she understands it is important to treat her hyperlipidemia we discussed either restarting or discussed with her primary physician about an alternative statin that may have less risk for muscle symptoms.  A/P to continue pramipexole 0.5 - 0.75mg 1-2 hours before bed.     7/26/2022 -overall, she feels that her restless legs have been stable and no specific concerns today.  She notes on occasion but medication can seem to take slightly longer to be an effect, but otherwise has been taking the 0.75 mg dose without issue.    A/P to continue pramipexole 0.75mg 1-2 " hours before bed.    3/8/2023 -Enclarityt message from patient, noting some increase in restless leg symptoms and we did increase her pramipexole from 0.75 mg up to 1 mg.    Today -she feels that the pramipexole is working very well on the new slightly higher dose.  She has no concerns today.        Past medical history:    Patient Active Problem List    Diagnosis Date Noted     Umbilical hernia without obstruction and without gangrene 2023     Priority: Medium     Added automatically from request for surgery [id number]       Overactive bladder 2021     Priority: Medium     Added automatically from request for surgery [id number]       Restless legs syndrome (RLS) 2020     Priority: Medium     Morbid obesity (H) 2020     Priority: Medium     Somatic dysfunction of pelvic region 10/17/2018     Priority: Medium     Cherry angioma 2015     Priority: Medium     Skin cancer screening 2015     Priority: Medium     Multiple benign nevi 2015     Priority: Medium     Dermatofibroma 2015     Priority: Medium     Skin tag 2015     Priority: Medium     Chronic hypertension with superimposed preeclampsia 2015     Priority: Medium     S/P  section 2015     Priority: Medium     Chronic hypertension 2015     Priority: Medium     LGA >97th %ile at 36+6 (efw 3871 g) 2015     Priority: Medium     Class: Acute     High-risk pregnancy, AMA 2014     Priority: Medium     Class: Acute     14: Normal NT  Growth US q 3 wks beginning at 28 weeks  Will need weekly BPPs starting at 32 weeks  *please let Nandini Page know when IOL starts/[phone]*       HSV (herpes simplex virus) infection 2014     Priority: Medium     Genital; outbreaks once annually; discussed prophylaxsis starting at 36 weeks.       Cervical polyp 2014     Priority: Medium     [place] removed polyp, no bleeding since       Elevated blood pressure reading without diagnosis of  hypertension 11/24/2014     Priority: Medium     4/3/15: /84 today.  2.18.15: labetalol daily, stopped aspirin  11/24 Agrees to monitor daily; will schedule  appt with Dr Colon  12/2015:Baseline labs drawn-all nml        Vitamin D deficiency 11/24/2014     Priority: Medium     Taking 5,000 iU daily  Problem list name updated by automated process. Provider to review       Hyperlipidemia 06/20/2014     Priority: Medium     FH and previous history of  Problem list name updated by automated process. Provider to review         10 point ROS of systems including Constitutional, Eyes, Respiratory, Cardiovascular, Gastroenterology, Genitourinary, Integumentary, Muscularskeletal, Psychiatric were all negative except for pertinent positives noted in my HPI.    Current Outpatient Medications   Medication Sig Dispense Refill     atorvastatin (LIPITOR) 20 MG tablet Take 1 tablet (20 mg) by mouth daily 90 tablet 3     enalapril (VASOTEC) 20 MG tablet Take 1 tablet (20 mg) by mouth daily 90 tablet 3     insulin pen needle (31G X 5 MM) 31G X 5 MM miscellaneous Use 1 pen needle daily or as directed. 100 each 1     liraglutide - Weight Management (SAXENDA) 18 MG/3ML pen Inject 3 mg Subcutaneous daily 15 mL 11     mirabegron (MYRBETRIQ) 50 MG 24 hr tablet Take 1 tablet (50 mg) by mouth daily 90 tablet 3     pramipexole (MIRAPEX) 1 MG tablet Take 1 tablet by mouth at 1.5-2 hours before bed. 90 tablet 1     SUMAtriptan (IMITREX) 50 MG tablet Take 1 tablet (50 mg) by mouth at onset of headache for migraine 12 tablet 4     vibegron (GEMTESA) 75 MG TABS tablet Take 1 tablet (75 mg) by mouth daily 90 tablet 3     VITAMIN D, CHOLECALCIFEROL, PO Take 5,000 Units by mouth every other day         OBJECTIVE:  There were no vitals taken for this visit.    Physical Exam     ---  This note was written with the assistance of the Dragon voice-dictation technology software. The final document, although reviewed, may contain errors. For  corrections, please contact the office.    Total time spent preparing to see the patient, review of chart, obtaining history and physical examination, review of sleep testing, review of treatment options, education, discussion with patient and documenting in Epic / EMR was 15 minutes.  All time involved was spent on the day of service for the patient (the same day as the patient's appointment).    Rico Gu MD    Sleep Medicine    [place], [place]  o Main Office: [phone]    [facility] [place], [place]  o [address], [address]  o Schedule visits: [phone]  o Main Office: [phone]  o Fax: [phone]

## 2023-06-01 ENCOUNTER — VIRTUAL VISIT (OUTPATIENT)
Dept: PULMONOLOGY | Facility: OTHER | Age: 53
End: 2023-06-01
Attending: FAMILY MEDICINE
Payer: COMMERCIAL

## 2023-06-01 ENCOUNTER — HEALTH MAINTENANCE LETTER (OUTPATIENT)
Age: 53
End: 2023-06-01

## 2023-06-01 VITALS
BODY MASS INDEX: 32.8 KG/M2 | DIASTOLIC BLOOD PRESSURE: 80 MMHG | HEIGHT: 67 IN | WEIGHT: 209 LBS | SYSTOLIC BLOOD PRESSURE: 121 MMHG

## 2023-06-01 DIAGNOSIS — G25.81 RESTLESS LEGS SYNDROME (RLS): Primary | ICD-10-CM

## 2023-06-01 PROCEDURE — 99213 OFFICE O/P EST LOW 20 MIN: CPT | Mod: VID | Performed by: FAMILY MEDICINE

## 2023-06-01 ASSESSMENT — PAIN SCALES - GENERAL: PAINLEVEL: NO PAIN (0)

## 2023-06-01 NOTE — NURSING NOTE
Is the patient currently in the state of [place]? YES    Visit mode:VIDEO    If the visit is dropped, the patient can be reconnected by: VIDEO VISIT: Text to cell phone: [phone]    Will anyone else be joining the visit? NO      How would you like to obtain your AVS? MyChart    Are changes needed to the allergy or medication list? NO    Reason for visit: RECHECK      Has patient had flu shot for current/most recent flu season? If so, when? Yes: 11/2022      Malu Stearns vf

## 2023-06-01 NOTE — PROGRESS NOTES
"Virtual Visit Details    Type of service:  Video Visit   Video Start Time: {video visit start/end time for provider to select:672238}  Video End Time:{video visit start/end time for provider to select:627470}    Originating Location (pt. Location): {video visit patient location:161963::\"Home\"}  {PROVIDER LOCATION On-site should be selected for visits conducted from your clinic location or adjoining [facility] hospital, academic office, or other nearby [facility] building. Off-site should be selected for all other provider locations, including home:77[address]}  Distant Location (provider location):  {virtual location provider:646195}  Platform used for Video Visit: {Virtual Visit Platforms:744820::\"ARKeX\"}  "

## 2023-06-30 ENCOUNTER — OFFICE VISIT (OUTPATIENT)
Dept: FAMILY MEDICINE | Facility: CLINIC | Age: 53
End: 2023-06-30
Attending: FAMILY MEDICINE
Payer: COMMERCIAL

## 2023-06-30 ENCOUNTER — ANESTHESIA EVENT (OUTPATIENT)
Dept: SURGERY | Facility: AMBULATORY SURGERY CENTER | Age: 53
End: 2023-06-30
Payer: COMMERCIAL

## 2023-06-30 ENCOUNTER — LAB (OUTPATIENT)
Dept: LAB | Facility: CLINIC | Age: 53
End: 2023-06-30
Attending: FAMILY MEDICINE
Payer: COMMERCIAL

## 2023-06-30 VITALS
HEART RATE: 83 BPM | DIASTOLIC BLOOD PRESSURE: 75 MMHG | HEIGHT: 66 IN | WEIGHT: 203 LBS | BODY MASS INDEX: 32.62 KG/M2 | SYSTOLIC BLOOD PRESSURE: 112 MMHG

## 2023-06-30 DIAGNOSIS — K42.9 UMBILICAL HERNIA WITHOUT OBSTRUCTION AND WITHOUT GANGRENE: ICD-10-CM

## 2023-06-30 DIAGNOSIS — Z01.818 PREOP EXAMINATION: ICD-10-CM

## 2023-06-30 DIAGNOSIS — Z01.818 PREOP EXAMINATION: Primary | ICD-10-CM

## 2023-06-30 PROBLEM — M99.05 SOMATIC DYSFUNCTION OF PELVIC REGION: Status: RESOLVED | Noted: 2018-10-17 | Resolved: 2023-06-30

## 2023-06-30 LAB
ANION GAP SERPL CALCULATED.3IONS-SCNC: 10 MMOL/L (ref 7–15)
BUN SERPL-MCNC: 10.2 MG/DL (ref 6–20)
CALCIUM SERPL-MCNC: 9.2 MG/DL (ref 8.6–10)
CHLORIDE SERPL-SCNC: 105 MMOL/L (ref 98–107)
CREAT SERPL-MCNC: 0.71 MG/DL (ref 0.51–0.95)
DEPRECATED HCO3 PLAS-SCNC: 26 MMOL/L (ref 22–29)
ERYTHROCYTE [DISTWIDTH] IN BLOOD BY AUTOMATED COUNT: 12.1 % (ref 10–15)
GFR SERPL CREATININE-BSD FRML MDRD: >90 ML/MIN/1.73M2
GLUCOSE SERPL-MCNC: 90 MG/DL (ref 70–99)
HCT VFR BLD AUTO: 38 % (ref 35–47)
HGB BLD-MCNC: 13.2 G/DL (ref 11.7–15.7)
MCH RBC QN AUTO: 31.8 PG (ref 26.5–33)
MCHC RBC AUTO-ENTMCNC: 34.7 G/DL (ref 31.5–36.5)
MCV RBC AUTO: 92 FL (ref 78–100)
PLATELET # BLD AUTO: 200 10E3/UL (ref 150–450)
POTASSIUM SERPL-SCNC: 4.1 MMOL/L (ref 3.4–5.3)
RBC # BLD AUTO: 4.15 10E6/UL (ref 3.8–5.2)
SODIUM SERPL-SCNC: 141 MMOL/L (ref 136–145)
WBC # BLD AUTO: 4.7 10E3/UL (ref 4–11)

## 2023-06-30 PROCEDURE — 85018 HEMOGLOBIN: CPT

## 2023-06-30 PROCEDURE — G0463 HOSPITAL OUTPT CLINIC VISIT: HCPCS | Performed by: FAMILY MEDICINE

## 2023-06-30 PROCEDURE — 99214 OFFICE O/P EST MOD 30 MIN: CPT | Performed by: FAMILY MEDICINE

## 2023-06-30 PROCEDURE — 80048 BASIC METABOLIC PNL TOTAL CA: CPT

## 2023-06-30 PROCEDURE — 36415 COLL VENOUS BLD VENIPUNCTURE: CPT

## 2023-06-30 ASSESSMENT — PAIN SCALES - GENERAL: PAINLEVEL: NO PAIN (0)

## 2023-06-30 NOTE — PROGRESS NOTES
[facility] PROFESSIONAL BLDG  3RD FLR,[address]  [address]  [facility] [address]  [facility] [address]  Phone: [phone]  Fax: [phone]  Primary Provider: Nadira Colon  Pre-op Performing Provider: ABUNDIO TERAN    PREOPERATIVE EVALUATION:  Today's date: 6/30/2023    Adriana Lucia is a 52 year old female who presents for a preoperative evaluation.    Surgical Information:  Surgery/Procedure: HERNIORRHAPHY, UMBILICAL, OPEN  Surgery Location: [facility]  Surgeon: Dr. Louis  Surgery Date: 7/3/2023  Time of Surgery: 7:15am  Where patient plans to recover: At home with family  Fax number for surgical facility: Note does not need to be faxed, will be available electronically in Epic.    Assessment & Plan     The proposed surgical procedure is considered INTERMEDIATE risk.    Preop examination  Umbilical hernia without obstruction and without gangrene  - Basic metabolic panel; Future  - CBC with platelets; Future       - No identified additional risk factors other than previously addressed    Antiplatelet or Anticoagulation Medication Instructions:   - Patient is on no antiplatelet or anticoagulation medications.    Additional Medication Instructions:  Patient is to take all scheduled medications on the day of surgery EXCEPT for modifications listed below:   - ACE/ARB: HOLD on day of surgery (minimum 11 hours for general anesthesia).   - Statins: Continue taking on the day of surgery.    - GLP-1 Injectable (exenitide, liraglutide, semaglutide, dulaglutide, etc.): HOLD day of surgery    - mirabegron (Myrbetriq), vibegron, (Gemtesa): HOLD day of surgery.    RECOMMENDATION:  APPROVAL GIVEN to proceed with proposed procedure, without further diagnostic evaluation.      Subjective       HPI related to upcoming procedure:   Adriana has umbilical hernia repair scheduled.     1. Do you have chest pain when you re physically active? No  2. Do you currently have a cold, bronchitis or  symptoms of other respiratory (head and chest) infections? No  3. Do you have a cough, shortness of breath, or wheezing? No    4. Have you ever had anemia or been told to take iron pills? No (did take iron supplement for RLS but not due to anemia)  5. Have you had any abnormal blood loss such as black, tarry or bloody stools, or abnormal vaginal bleeding? No  6. Have you ever had a blood transfusion? No  7. Are you willing to have a blood transfusion if it is medically needed before, during or after your surgery? Yes  8. Have you ever had a heart attack or stroke? No  9. Have you ever had surgery on your heart or blood vessels, such as a stent, coronary (heart) bypass, or surgery on an artery in the head, neck, heart or legs? No  10. Do you have a history of heart failure? No  11. Do you have sleep apnea, excessive snoring or daytime drowsiness? No    12. Do you or anyone in your family have a history of blood clots? Mom has had a blood clot with childbirth  13. Do you or anyone in your family have a serious bleeding problem, such as longlasting bleeding after surgeries or cuts? No  14. Have you or anyone in your family ever had problems with anesthesia (sedation for surgery)? No    15. Do you have any artificial heart valves or other implanted medical devices, such as a pacemaker, defibrillator or continuous glucose monitor? No  16. Do you have any artificial joints? No  17. Are you allergic to latex? No  18. Is there any chance that you may be pregnant? No      Preoperative Review of :   reviewed - no record of controlled substances prescribed.    Status of Chronic Conditions:  HYPERLIPIDEMIA - Patient has a long history of significant Hyperlipidemia requiring medication for treatment with recent good control. Patient reports no problems or side effects with the medication.     HYPERTENSION - Patient has longstanding history of HTN , currently denies any symptoms referable to elevated blood pressure.  Specifically denies chest pain, palpitations, dyspnea, orthopnea, PND or peripheral edema. Blood pressure readings have been in normal range. Current medication regimen is as listed below. Patient denies any side effects of medication.       Review of Systems  Answers for HPI/ROS submitted by the patient on 2023  General Symptoms: No  Skin Symptoms: No  HENT Symptoms: No  EYE SYMPTOMS: No  HEART SYMPTOMS: No  LUNG SYMPTOMS: No  INTESTINAL SYMPTOMS: No  URINARY SYMPTOMS: No  GYNECOLOGIC SYMPTOMS: No  BREAST SYMPTOMS: No  SKELETAL SYMPTOMS: No  BLOOD SYMPTOMS: No  NERVOUS SYSTEM SYMPTOMS: No  MENTAL HEALTH SYMPTOMS: No     Past Medical History:   Diagnosis Date     Chronic hypertension with superimposed preeclampsia 2015     HSV (herpes simplex virus) infection      Hyperlipidemia      Hypertension      Overactive bladder 2021    Added automatically from request for surgery [id number]     Restless legs syndrome (RLS) 2020     S/P  section 2015     Past Surgical History:   Procedure Laterality Date      SECTION N/A 2015    Procedure:  SECTION;  Surgeon: Meredith Clark MD;  [facility]     COLONOSCOPY N/A 2022    Procedure: COLONOSCOPY, WITH POLYPECTOMY;  Surgeon: Madelyn Bishop MD;  Location: [facility] OR     IMPLANT STIMULATOR SACRAL NERVE STAGE ONE N/A 2021    Procedure: INSERTION, SACRAL NERVE STIMULATOR, STAGE 1;  Surgeon: Eyad Hawk MD;  Location: [facility] OR     IMPLANT STIMULATOR SACRAL NERVE STAGE TWO N/A 03/15/2021    Procedure: INSERTION, SACRAL NERVE STIMULATOR, STAGE 2 Pleaseschedule 2 weeks after stage 1 implant;  Surgeon: Eyad Hawk MD;  Location: [facility] OR     wisdom teeth           Current Outpatient Medications   Medication Sig Dispense Refill     atorvastatin (LIPITOR) 20 MG tablet Take 1 tablet (20 mg) by mouth daily 90 tablet 3     enalapril (VASOTEC) 20 MG tablet Take 1 tablet (20 mg) by mouth daily 90  "tablet 3     liraglutide - Weight Management (SAXENDA) 18 MG/3ML pen Inject 3 mg Subcutaneous daily 15 mL 11     pramipexole (MIRAPEX) 1 MG tablet Take 1 tablet by mouth at 1.5-2 hours before bed. 90 tablet 1     vibegron (GEMTESA) 75 MG TABS tablet Take 1 tablet (75 mg) by mouth daily 90 tablet 3     VITAMIN D, CHOLECALCIFEROL, PO Take 5,000 Units by mouth every other day       insulin pen needle (31G X 5 MM) 31G X 5 MM miscellaneous Use 1 pen needle daily or as directed. 100 each 1     SUMAtriptan (IMITREX) 50 MG tablet Take 1 tablet (50 mg) by mouth at onset of headache for migraine 12 tablet 4       No Known Allergies     SH: No tobacco use. Little to no alcohol use. Occasional marijuana use.         Objective     /75   Pulse 83   Ht 1.676 m (5' 6\")   Wt 92.1 kg (203 lb)   BMI 32.77 kg/m      Physical Exam    GENERAL APPEARANCE: healthy, alert and no distress     EYES: EOMI, PERRL     HENT: ear canals and TM's normal and nose and mouth without ulcers or lesions     NECK: no adenopathy, no asymmetry, masses, or scars and thyroid normal to palpation     RESP: lungs clear to auscultation - no rales, rhonchi or wheezes     CV: regular rates and rhythm, normal S1 S2, no S3 or S4 and no murmur, click or rub     ABDOMEN:  soft, nontender     MS: extremities normal- no gross deformities noted, no evidence of inflammation in joints, FROM in all extremities.     SKIN: no suspicious lesions or rashes     NEURO: Normal strength and tone, sensory exam grossly normal, mentation intact and speech normal     PSYCH: mentation appears normal. and affect normal/bright     LYMPHATICS: No cervical adenopathy    Recent Labs   Lab Test 04/24/23  0822 07/19/22  1731   HGB 14.0 14.0    255    135   POTASSIUM 3.5 3.5   CR 0.74 0.73        Diagnostics:  Labs pending at this time.  Results will be reviewed when available.   No EKG required, no history of coronary heart disease, significant arrhythmia, peripheral " arterial disease or other structural heart disease.    Revised Cardiac Risk Index (RCRI):  The patient has the following serious cardiovascular risks for perioperative complications:   - No serious cardiac risks = 0 points     RCRI Interpretation: 0 points: Class I (very low risk - 0.4% complication rate)       Signed Electronically by: Shara Gaines MD  Copy of this evaluation report is provided to requesting physician.

## 2023-06-30 NOTE — ANESTHESIA PREPROCEDURE EVALUATION
Anesthesia Pre-Procedure Evaluation    Patient: Adriana Lucia   MRN: [id number] : 1970        Procedure : Procedure(s):  HERNIORRHAPHY, UMBILICAL, OPEN          Past Medical History:   Diagnosis Date     Chronic hypertension with superimposed preeclampsia 2015     HSV (herpes simplex virus) infection      Hyperlipidemia      Hypertension      Overactive bladder 2021    Added automatically from request for surgery [id number]     Restless legs syndrome (RLS) 2020     S/P  section 2015      Past Surgical History:   Procedure Laterality Date      SECTION N/A 2015    Procedure:  SECTION;  Surgeon: Meredith Clark MD;  [facility]     COLONOSCOPY N/A 2022    Procedure: COLONOSCOPY, WITH POLYPECTOMY;  Surgeon: Madelyn Bishop MD;  Location: [facility] OR     IMPLANT STIMULATOR SACRAL NERVE STAGE ONE N/A 2021    Procedure: INSERTION, SACRAL NERVE STIMULATOR, STAGE 1;  Surgeon: Eyad Hawk MD;  Location: [facility] OR     IMPLANT STIMULATOR SACRAL NERVE STAGE TWO N/A 03/15/2021    Procedure: INSERTION, SACRAL NERVE STIMULATOR, STAGE 2 Pleaseschedule 2 weeks after stage 1 implant;  Surgeon: Eyad Hawk MD;  Location: [facility] OR     wisdom teeth            No Known Allergies   Social History     Tobacco Use     Smoking status: Never     Smokeless tobacco: Former     Quit date: 2014   Substance Use Topics     Alcohol use: Yes      Wt Readings from Last 1 Encounters:   23 94.8 kg (209 lb)           Physical Exam    Airway        Mallampati: II   TM distance: > 3 FB   Neck ROM: full   Mouth opening: > 3 cm    Respiratory Devices and Support         Dental       (+) Modest Abnormalities - crowns, retainers, 1 or 2 missing teeth      Cardiovascular   cardiovascular exam normal          Pulmonary   pulmonary exam normal                OUTSIDE LABS:  CBC:   Lab Results   Component Value Date    WBC 5.3 2023    WBC 5.6  07/19/2022    HGB 14.0 04/24/2023    HGB 14.0 07/19/2022    HCT 41.0 04/24/2023    HCT 40.9 07/19/2022     04/24/2023     07/19/2022     BMP:   Lab Results   Component Value Date     04/24/2023     07/19/2022    POTASSIUM 3.5 04/24/2023    POTASSIUM 3.5 07/19/2022    CHLORIDE 102 04/24/2023    CHLORIDE 103 07/19/2022    CO2 27 04/24/2023    CO2 28 07/19/2022    BUN 13.2 04/24/2023    BUN 11 07/19/2022    CR 0.74 04/24/2023    CR 0.73 07/19/2022    GLC 94 04/24/2023     (H) 07/19/2022     COAGS: No results found for: PTT, INR, FIBR  POC:   Lab Results   Component Value Date    HCG Negative 05/23/2022     HEPATIC:   Lab Results   Component Value Date    ALBUMIN 4.0 07/19/2022    PROTTOTAL 7.7 07/19/2022    ALT 21 07/19/2022    AST 13 07/19/2022    ALKPHOS 49 07/19/2022    BILITOTAL 0.7 07/19/2022     OTHER:   Lab Results   Component Value Date    DARIELA 10.3 (H) 04/24/2023    MAG 4.3 (H) 06/12/2015    LIPASE 162 07/19/2022    TSH 2.42 04/18/2022       Anesthesia Plan    ASA Status:  2   NPO Status:  NPO Appropriate    Anesthesia Type: MAC.     - Reason for MAC: straight local not clinically adequate   Induction: Intravenous, Propofol.   Maintenance: TIVA.        Consents    Anesthesia Plan(s) and associated risks, benefits, and realistic alternatives discussed. Questions answered and patient/representative(s) expressed understanding.    - Discussed:     - Discussed with:  Patient      - Extended Intubation/Ventilatory Support Discussed: No.      - Patient is DNR/DNI Status: No    Use of blood products discussed: No .     Postoperative Care    Pain management: IV analgesics, Oral pain medications, Multi-modal analgesia.   PONV prophylaxis: Dexamethasone or Solumedrol, Ondansetron (or other 5HT-3), Background Propofol Infusion     Comments:                Alexandro Rose MD

## 2023-06-30 NOTE — H&P (VIEW-ONLY)
[facility], [facility] PROFESSIONAL BLDG  3RD FLR,[address]  [address]  [facility] [address]  [facility] [address]  Phone: [phone]  Fax: [phone]  Primary Provider: Nadira Colon  Pre-op Performing Provider: ABUNDIO TERAN    PREOPERATIVE EVALUATION:  Today's date: 6/30/2023    Adriana Lucia is a 52 year old female who presents for a preoperative evaluation.    Surgical Information:  Surgery/Procedure: HERNIORRHAPHY, UMBILICAL, OPEN  Surgery Location: [place]  Surgeon: Dr. Louis  Surgery Date: 7/3/2023  Time of Surgery: 7:15am  Where patient plans to recover: At home with family  Fax number for surgical facility: Note does not need to be faxed, will be available electronically in Epic.    Assessment & Plan     The proposed surgical procedure is considered INTERMEDIATE risk.    Preop examination  Umbilical hernia without obstruction and without gangrene  - Basic metabolic panel; Future  - CBC with platelets; Future       - No identified additional risk factors other than previously addressed    Antiplatelet or Anticoagulation Medication Instructions:   - Patient is on no antiplatelet or anticoagulation medications.    Additional Medication Instructions:  Patient is to take all scheduled medications on the day of surgery EXCEPT for modifications listed below:   - ACE/ARB: HOLD on day of surgery (minimum 11 hours for general anesthesia).   - Statins: Continue taking on the day of surgery.    - GLP-1 Injectable (exenitide, liraglutide, semaglutide, dulaglutide, etc.): HOLD day of surgery    - mirabegron (Myrbetriq), vibegron, (Gemtesa): HOLD day of surgery.    RECOMMENDATION:  APPROVAL GIVEN to proceed with proposed procedure, without further diagnostic evaluation.      Subjective       HPI related to upcoming procedure:   Adriana has umbilical hernia repair scheduled.     1. Do you have chest pain when you re physically active? No  2. Do you currently have a cold, bronchitis or  symptoms of other respiratory (head and chest) infections? No  3. Do you have a cough, shortness of breath, or wheezing? No    4. Have you ever had anemia or been told to take iron pills? No (did take iron supplement for RLS but not due to anemia)  5. Have you had any abnormal blood loss such as black, tarry or bloody stools, or abnormal vaginal bleeding? No  6. Have you ever had a blood transfusion? No  7. Are you willing to have a blood transfusion if it is medically needed before, during or after your surgery? Yes  8. Have you ever had a heart attack or stroke? No  9. Have you ever had surgery on your heart or blood vessels, such as a stent, coronary (heart) bypass, or surgery on an artery in the head, neck, heart or legs? No  10. Do you have a history of heart failure? No  11. Do you have sleep apnea, excessive snoring or daytime drowsiness? No    12. Do you or anyone in your family have a history of blood clots? Mom has had a blood clot with childbirth  13. Do you or anyone in your family have a serious bleeding problem, such as longlasting bleeding after surgeries or cuts? No  14. Have you or anyone in your family ever had problems with anesthesia (sedation for surgery)? No    15. Do you have any artificial heart valves or other implanted medical devices, such as a pacemaker, defibrillator or continuous glucose monitor? No  16. Do you have any artificial joints? No  17. Are you allergic to latex? No  18. Is there any chance that you may be pregnant? No      Preoperative Review of :   reviewed - no record of controlled substances prescribed.    Status of Chronic Conditions:  HYPERLIPIDEMIA - Patient has a long history of significant Hyperlipidemia requiring medication for treatment with recent good control. Patient reports no problems or side effects with the medication.     HYPERTENSION - Patient has longstanding history of HTN , currently denies any symptoms referable to elevated blood pressure.  Specifically denies chest pain, palpitations, dyspnea, orthopnea, PND or peripheral edema. Blood pressure readings have been in normal range. Current medication regimen is as listed below. Patient denies any side effects of medication.       Review of Systems  Answers for HPI/ROS submitted by the patient on 2023  General Symptoms: No  Skin Symptoms: No  HENT Symptoms: No  EYE SYMPTOMS: No  HEART SYMPTOMS: No  LUNG SYMPTOMS: No  INTESTINAL SYMPTOMS: No  URINARY SYMPTOMS: No  GYNECOLOGIC SYMPTOMS: No  BREAST SYMPTOMS: No  SKELETAL SYMPTOMS: No  BLOOD SYMPTOMS: No  NERVOUS SYSTEM SYMPTOMS: No  MENTAL HEALTH SYMPTOMS: No     Past Medical History:   Diagnosis Date     Chronic hypertension with superimposed preeclampsia 2015     HSV (herpes simplex virus) infection      Hyperlipidemia      Hypertension      Overactive bladder 2021    Added automatically from request for surgery [id number]     Restless legs syndrome (RLS) 2020     S/P  section 2015     Past Surgical History:   Procedure Laterality Date      SECTION N/A 2015    Procedure:  SECTION;  Surgeon: Meredith Clark MD;  Location: [facility]     COLONOSCOPY N/A 2022    Procedure: COLONOSCOPY, WITH POLYPECTOMY;  Surgeon: Madelyn Bishop MD;  [place] [facility] [place]     IMPLANT STIMULATOR SACRAL NERVE STAGE ONE N/A 2021    Procedure: INSERTION, SACRAL NERVE STIMULATOR, STAGE 1;  Surgeon: Eyad Hawk MD;  Location: [facility] OR     IMPLANT STIMULATOR SACRAL NERVE STAGE TWO N/A 03/15/2021    Procedure: INSERTION, SACRAL NERVE STIMULATOR, STAGE 2 Pleaseschedule 2 weeks after stage 1 implant;  Surgeon: Eyad Hawk MD;  [place] [facility] [place]     wisdom teeth           Current Outpatient Medications   Medication Sig Dispense Refill     atorvastatin (LIPITOR) 20 MG tablet Take 1 tablet (20 mg) by mouth daily 90 tablet 3     enalapril (VASOTEC) 20 MG tablet Take 1 tablet (20 mg) by mouth daily 90  "tablet 3     liraglutide - Weight Management (SAXENDA) 18 MG/3ML pen Inject 3 mg Subcutaneous daily 15 mL 11     pramipexole (MIRAPEX) 1 MG tablet Take 1 tablet by mouth at 1.5-2 hours before bed. 90 tablet 1     vibegron (GEMTESA) 75 MG TABS tablet Take 1 tablet (75 mg) by mouth daily 90 tablet 3     VITAMIN D, CHOLECALCIFEROL, PO Take 5,000 Units by mouth every other day       insulin pen needle (31G X 5 MM) 31G X 5 MM miscellaneous Use 1 pen needle daily or as directed. 100 each 1     SUMAtriptan (IMITREX) 50 MG tablet Take 1 tablet (50 mg) by mouth at onset of headache for migraine 12 tablet 4       No Known Allergies     SH: No tobacco use. Little to no alcohol use. Occasional marijuana use.         Objective     /75   Pulse 83   Ht 1.676 m (5' 6\")   Wt 92.1 kg (203 lb)   BMI 32.77 kg/m      Physical Exam    GENERAL APPEARANCE: healthy, alert and no distress     EYES: EOMI, PERRL     HENT: ear canals and TM's normal and nose and mouth without ulcers or lesions     NECK: no adenopathy, no asymmetry, masses, or scars and thyroid normal to palpation     RESP: lungs clear to auscultation - no rales, rhonchi or wheezes     CV: regular rates and rhythm, normal S1 S2, no S3 or S4 and no murmur, click or rub     ABDOMEN:  soft, nontender     MS: extremities normal- no gross deformities noted, no evidence of inflammation in joints, FROM in all extremities.     SKIN: no suspicious lesions or rashes     NEURO: Normal strength and tone, sensory exam grossly normal, mentation intact and speech normal     PSYCH: mentation appears normal. and affect normal/bright     LYMPHATICS: No cervical adenopathy    Recent Labs   Lab Test 04/24/23  0822 07/19/22  1731   HGB 14.0 14.0    255    135   POTASSIUM 3.5 3.5   CR 0.74 0.73        Diagnostics:  Labs pending at this time.  Results will be reviewed when available.   No EKG required, no history of coronary heart disease, significant arrhythmia, peripheral " arterial disease or other structural heart disease.    Revised Cardiac Risk Index (RCRI):  The patient has the following serious cardiovascular risks for perioperative complications:   - No serious cardiac risks = 0 points     RCRI Interpretation: 0 points: Class I (very low risk - 0.4% complication rate)       Signed Electronically by: Shara Gianes MD  Copy of this evaluation report is provided to requesting physician.

## 2023-06-30 NOTE — PATIENT INSTRUCTIONS
For informational purposes only. Not to replace the advice of your health care provider. Copyright   2003,  [facility] [employer] [facility]. All rights reserved. Clinically reviewed by Catalina Valle MD. [employer] [id number] - REV .  Preparing for Your Surgery  Getting started  A nurse will call you to review your health history and instructions. They will give you an arrival time based on your scheduled surgery time. Please be ready to share:  Your doctor's clinic name and phone number  Your medical, surgical, and anesthesia history  A list of allergies and sensitivities  A list of medicines, including herbal treatments and over-the-counter drugs  Whether the patient has a legal guardian (ask how to send us the papers in advance)  Please tell us if you're pregnant--or if there's any chance you might be pregnant. Some surgeries may injure a fetus (unborn baby), so they require a pregnancy test. Surgeries that are safe for a fetus don't always need a test, and you can choose whether to have one.   If you have a child who's having surgery, please ask for a copy of Preparing for Your Child's Surgery.    Preparing for surgery  Within 10 to 30 days of surgery: Have a pre-op exam (sometimes called an H&P, or History and Physical). This can be done at a clinic or pre-operative center.  If you're having a , you may not need this exam. Talk to your care team.  At your pre-op exam, talk to your care team about all medicines you take. If you need to stop any medicines before surgery, ask when to start taking them again.  We do this for your safety. Many medicines can make you bleed too much during surgery. Some change how well surgery (anesthesia) drugs work.  Call your insurance company to let them know you're having surgery. (If you don't have insurance, call [phone].)  Call your clinic if there's any change in your health. This includes signs of a cold or flu (sore throat, runny nose, cough, rash, fever). It  also includes a scrape or scratch near the surgery site.  If you have questions on the day of surgery, call your hospital or surgery center.  Eating and drinking guidelines  For your safety: Unless your surgeon tells you otherwise, follow the guidelines below.  Eat and drink as usual until 8 hours before you arrive for surgery. After that, no food or milk.  Drink clear liquids until 2 hours before you arrive. These are liquids you can see through, like water, Gatorade, and Propel Water. They also include plain black coffee and tea (no cream or milk), candy, and breath mints. You can spit out gum when you arrive.  If you drink alcohol: Stop drinking it the night before surgery.  If your care team tells you to take medicine on the morning of surgery, it's okay to take it with a sip of water.  Preventing infection  Shower or bathe the night before and morning of your surgery. Follow the instructions your clinic gave you. (If no instructions, use regular soap.)  Don't shave or clip hair near your surgery site. We'll remove the hair if needed.  Don't smoke or vape the morning of surgery. You may chew nicotine gum up to 2 hours before surgery. A nicotine patch is okay.  Note: Some surgeries require you to completely quit smoking and nicotine. Check with your surgeon.  Your care team will make every effort to keep you safe from infection. We will:  Clean our hands often with soap and water (or an alcohol-based hand rub).  Clean the skin at your surgery site with a special soap that kills germs.  Give you a special gown to keep you warm. (Cold raises the risk of infection.)  Wear special hair covers, masks, gowns and gloves during surgery.  Give antibiotic medicine, if prescribed. Not all surgeries need antibiotics.  What to bring on the day of surgery  Photo ID and insurance card  Copy of your health care directive, if you have one  Glasses and hearing aids (bring cases)  You can't wear contacts during surgery  Inhaler and  eye drops, if you use them (tell us about these when you arrive)  CPAP machine or breathing device, if you use them  A few personal items, if spending the night  If you have . . .  A pacemaker, ICD (cardiac defibrillator) or other implant: Bring the ID card.  An implanted stimulator: Bring the remote control.  A legal guardian: Bring a copy of the certified (court-stamped) guardianship papers.  Please remove any jewelry, including body piercings. Leave jewelry and other valuables at home.  If you're going home the day of surgery  You must have a responsible adult drive you home. They should stay with you overnight as well.  If you don't have someone to stay with you, and you aren't safe to go home alone, we may keep you overnight. Insurance often won't pay for this.  After surgery  If it's hard to control your pain or you need more pain medicine, please call your surgeon's office.  Questions?   If you have any questions for your care team, list them here: _________________________________________________________________________________________________________________________________________________________________________ ____________________________________ ____________________________________ ____________________________________    How to Take Your Medication Before Surgery  - HOLD (do not take) enalapril, Gemtesa, Saxenda (do not take Saxenda the day before or the day of)

## 2023-07-03 ENCOUNTER — HOSPITAL ENCOUNTER (OUTPATIENT)
Facility: AMBULATORY SURGERY CENTER | Age: 53
Discharge: HOME | End: 2023-07-03
Attending: SURGERY
Payer: COMMERCIAL

## 2023-07-03 ENCOUNTER — TELEPHONE (OUTPATIENT)
Dept: SURGERY | Facility: CLINIC | Age: 53
End: 2023-07-03

## 2023-07-03 ENCOUNTER — ANESTHESIA (OUTPATIENT)
Dept: SURGERY | Facility: AMBULATORY SURGERY CENTER | Age: 53
End: 2023-07-03
Payer: COMMERCIAL

## 2023-07-03 ENCOUNTER — NURSE TRIAGE (OUTPATIENT)
Dept: NURSING | Facility: CLINIC | Age: 53
End: 2023-07-03

## 2023-07-03 VITALS
HEIGHT: 66 IN | WEIGHT: 201 LBS | BODY MASS INDEX: 32.3 KG/M2 | SYSTOLIC BLOOD PRESSURE: 135 MMHG | TEMPERATURE: 98.2 F | OXYGEN SATURATION: 98 % | RESPIRATION RATE: 12 BRPM | DIASTOLIC BLOOD PRESSURE: 81 MMHG | HEART RATE: 63 BPM

## 2023-07-03 DIAGNOSIS — K42.9 UMBILICAL HERNIA WITHOUT OBSTRUCTION AND WITHOUT GANGRENE: ICD-10-CM

## 2023-07-03 LAB
HCG UR QL: NEGATIVE
INTERNAL QC OK POCT: NORMAL
POCT KIT EXPIRATION DATE: NORMAL
POCT KIT LOT NUMBER: NORMAL

## 2023-07-03 PROCEDURE — 49593 RPR AA HRN 1ST 3-10 RDC: CPT | Performed by: SURGERY

## 2023-07-03 PROCEDURE — 81025 URINE PREGNANCY TEST: CPT | Performed by: PATHOLOGY

## 2023-07-03 DEVICE — IMPLANTABLE DEVICE: Type: IMPLANTABLE DEVICE | Site: UMBILICAL | Status: FUNCTIONAL

## 2023-07-03 RX ORDER — PROPOFOL 10 MG/ML
INJECTION, EMULSION INTRAVENOUS PRN
Status: DISCONTINUED | OUTPATIENT
Start: 2023-07-03 | End: 2023-07-03

## 2023-07-03 RX ORDER — LIDOCAINE HYDROCHLORIDE 20 MG/ML
INJECTION, SOLUTION INFILTRATION; PERINEURAL PRN
Status: DISCONTINUED | OUTPATIENT
Start: 2023-07-03 | End: 2023-07-03

## 2023-07-03 RX ORDER — LIDOCAINE 40 MG/G
CREAM TOPICAL
Status: DISCONTINUED | OUTPATIENT
Start: 2023-07-03 | End: 2023-07-03 | Stop reason: HOSPADM

## 2023-07-03 RX ORDER — FENTANYL CITRATE 50 UG/ML
25 INJECTION, SOLUTION INTRAMUSCULAR; INTRAVENOUS EVERY 5 MIN PRN
Status: DISCONTINUED | OUTPATIENT
Start: 2023-07-03 | End: 2023-07-03 | Stop reason: HOSPADM

## 2023-07-03 RX ORDER — FENTANYL CITRATE 50 UG/ML
50 INJECTION, SOLUTION INTRAMUSCULAR; INTRAVENOUS EVERY 5 MIN PRN
Status: DISCONTINUED | OUTPATIENT
Start: 2023-07-03 | End: 2023-07-03 | Stop reason: HOSPADM

## 2023-07-03 RX ORDER — CEFAZOLIN SODIUM 2 G/50ML
2 SOLUTION INTRAVENOUS
Status: COMPLETED | OUTPATIENT
Start: 2023-07-03 | End: 2023-07-03

## 2023-07-03 RX ORDER — ONDANSETRON 4 MG/1
4 TABLET, ORALLY DISINTEGRATING ORAL EVERY 30 MIN PRN
Status: DISCONTINUED | OUTPATIENT
Start: 2023-07-03 | End: 2023-07-03 | Stop reason: HOSPADM

## 2023-07-03 RX ORDER — AMOXICILLIN 250 MG
1-2 CAPSULE ORAL 2 TIMES DAILY
Qty: 15 TABLET | Refills: 0 | Status: SHIPPED | OUTPATIENT
Start: 2023-07-03 | End: 2024-04-29

## 2023-07-03 RX ORDER — CEFAZOLIN SODIUM 2 G/50ML
2 SOLUTION INTRAVENOUS SEE ADMIN INSTRUCTIONS
Status: DISCONTINUED | OUTPATIENT
Start: 2023-07-03 | End: 2023-07-03 | Stop reason: HOSPADM

## 2023-07-03 RX ORDER — ACETAMINOPHEN 325 MG/1
975 TABLET ORAL ONCE
Status: COMPLETED | OUTPATIENT
Start: 2023-07-03 | End: 2023-07-03

## 2023-07-03 RX ORDER — ONDANSETRON 4 MG/1
4 TABLET, ORALLY DISINTEGRATING ORAL EVERY 30 MIN PRN
Status: DISCONTINUED | OUTPATIENT
Start: 2023-07-03 | End: 2023-07-04 | Stop reason: HOSPADM

## 2023-07-03 RX ORDER — OXYCODONE HYDROCHLORIDE 5 MG/1
5 TABLET ORAL
Status: COMPLETED | OUTPATIENT
Start: 2023-07-03 | End: 2023-07-03

## 2023-07-03 RX ORDER — ONDANSETRON 2 MG/ML
4 INJECTION INTRAMUSCULAR; INTRAVENOUS EVERY 30 MIN PRN
Status: DISCONTINUED | OUTPATIENT
Start: 2023-07-03 | End: 2023-07-04 | Stop reason: HOSPADM

## 2023-07-03 RX ORDER — SODIUM CHLORIDE, SODIUM LACTATE, POTASSIUM CHLORIDE, CALCIUM CHLORIDE 600; 310; 30; 20 MG/100ML; MG/100ML; MG/100ML; MG/100ML
INJECTION, SOLUTION INTRAVENOUS CONTINUOUS
Status: DISCONTINUED | OUTPATIENT
Start: 2023-07-03 | End: 2023-07-03 | Stop reason: HOSPADM

## 2023-07-03 RX ORDER — KETAMINE HYDROCHLORIDE 10 MG/ML
INJECTION INTRAMUSCULAR; INTRAVENOUS PRN
Status: DISCONTINUED | OUTPATIENT
Start: 2023-07-03 | End: 2023-07-03

## 2023-07-03 RX ORDER — HYDROMORPHONE HYDROCHLORIDE 1 MG/ML
0.2 INJECTION, SOLUTION INTRAMUSCULAR; INTRAVENOUS; SUBCUTANEOUS EVERY 5 MIN PRN
Status: DISCONTINUED | OUTPATIENT
Start: 2023-07-03 | End: 2023-07-03 | Stop reason: HOSPADM

## 2023-07-03 RX ORDER — ONDANSETRON 2 MG/ML
4 INJECTION INTRAMUSCULAR; INTRAVENOUS EVERY 30 MIN PRN
Status: DISCONTINUED | OUTPATIENT
Start: 2023-07-03 | End: 2023-07-03 | Stop reason: HOSPADM

## 2023-07-03 RX ORDER — HYDROMORPHONE HYDROCHLORIDE 1 MG/ML
0.4 INJECTION, SOLUTION INTRAMUSCULAR; INTRAVENOUS; SUBCUTANEOUS EVERY 5 MIN PRN
Status: DISCONTINUED | OUTPATIENT
Start: 2023-07-03 | End: 2023-07-03 | Stop reason: HOSPADM

## 2023-07-03 RX ORDER — PROPOFOL 10 MG/ML
INJECTION, EMULSION INTRAVENOUS CONTINUOUS PRN
Status: DISCONTINUED | OUTPATIENT
Start: 2023-07-03 | End: 2023-07-03

## 2023-07-03 RX ORDER — HYDROCODONE BITARTRATE AND ACETAMINOPHEN 5; 325 MG/1; MG/1
1-2 TABLET ORAL EVERY 4 HOURS PRN
Qty: 15 TABLET | Refills: 0 | Status: SHIPPED | OUTPATIENT
Start: 2023-07-03 | End: 2024-04-29

## 2023-07-03 RX ORDER — ONDANSETRON 2 MG/ML
INJECTION INTRAMUSCULAR; INTRAVENOUS PRN
Status: DISCONTINUED | OUTPATIENT
Start: 2023-07-03 | End: 2023-07-03

## 2023-07-03 RX ADMIN — PROPOFOL 125 MCG/KG/MIN: 10 INJECTION, EMULSION INTRAVENOUS at 07:45

## 2023-07-03 RX ADMIN — HYDROMORPHONE HYDROCHLORIDE 0.2 MG: 1 INJECTION, SOLUTION INTRAMUSCULAR; INTRAVENOUS; SUBCUTANEOUS at 08:33

## 2023-07-03 RX ADMIN — KETAMINE HYDROCHLORIDE 10 MG: 10 INJECTION INTRAMUSCULAR; INTRAVENOUS at 07:33

## 2023-07-03 RX ADMIN — ACETAMINOPHEN 975 MG: 325 TABLET ORAL at 06:22

## 2023-07-03 RX ADMIN — OXYCODONE HYDROCHLORIDE 5 MG: 5 TABLET ORAL at 09:03

## 2023-07-03 RX ADMIN — ONDANSETRON 4 MG: 2 INJECTION INTRAMUSCULAR; INTRAVENOUS at 07:22

## 2023-07-03 RX ADMIN — PROPOFOL 30 MG: 10 INJECTION, EMULSION INTRAVENOUS at 07:33

## 2023-07-03 RX ADMIN — KETAMINE HYDROCHLORIDE 20 MG: 10 INJECTION INTRAMUSCULAR; INTRAVENOUS at 07:22

## 2023-07-03 RX ADMIN — LIDOCAINE HYDROCHLORIDE 80 MG: 20 INJECTION, SOLUTION INFILTRATION; PERINEURAL at 07:15

## 2023-07-03 RX ADMIN — PROPOFOL 150 MCG/KG/MIN: 10 INJECTION, EMULSION INTRAVENOUS at 07:15

## 2023-07-03 RX ADMIN — CEFAZOLIN SODIUM 2 G: 2 SOLUTION INTRAVENOUS at 07:10

## 2023-07-03 RX ADMIN — HYDROMORPHONE HYDROCHLORIDE 0.2 MG: 1 INJECTION, SOLUTION INTRAMUSCULAR; INTRAVENOUS; SUBCUTANEOUS at 08:38

## 2023-07-03 RX ADMIN — SODIUM CHLORIDE, SODIUM LACTATE, POTASSIUM CHLORIDE, CALCIUM CHLORIDE: 600; 310; 30; 20 INJECTION, SOLUTION INTRAVENOUS at 07:11

## 2023-07-03 RX ADMIN — HYDROMORPHONE HYDROCHLORIDE 0.1 MG: 1 INJECTION, SOLUTION INTRAMUSCULAR; INTRAVENOUS; SUBCUTANEOUS at 08:44

## 2023-07-03 RX ADMIN — PROPOFOL 80 MG: 10 INJECTION, EMULSION INTRAVENOUS at 07:15

## 2023-07-03 NOTE — OP NOTE
Preop diagnosis umbilical hernia  Postop diagnosis umbilical hernia  Operative procedure open mesh repair umbilical hernia  Surgeon DAKOTA Louis MD  Indications for procedure: This patient presents with an umbilical hernia informed consent was obtained.  Anesthesia IV sedation plus local infiltration of Marcaine  Operative findings: Umbilical hernia approximately 3 x 3 cm defect.  Mesh underlay with pariah Kyree ProGrip mesh  Operative procedure: Patient brought to the operating room and put under IV sedation.  Her periumbilical region was widely prepped and draped in the usual sterile fashion.  Injected with Marcaine throughout for adequate anesthesia.  Infraumbilical skin incision was made and dissection carried down to the fascia.  Overlying umbilical skin was taken off of the hernia sac.  The hernia sac was then reduced into the preperitoneal space which was spread widely to allow for placement of mesh.  The mesh was a 8 x 5 cm prior Kyree ProGrip mesh placed as an underlay and secured out laterally with running Vicryl suture the fascial defect was then closed over the mesh with a running locked 0 Vicryl.  This was done per my routine.  Excellent hemostasis was noted throughout and the skin was closed with a subcuticular of 4-0 Monocryl.  Sterile dressing was placed.  Estimated blood loss minimal  No pathology.  The patient was taken to the recovery room where she was without difficulty or apparent complication.

## 2023-07-03 NOTE — INTERVAL H&P NOTE
"I have reviewed the surgical (or preoperative) H&P that is linked to this encounter, and examined the patient. There are no significant changes    Clinical Conditions Present on Arrival:  Clinically Significant Risk Factors Present on Admission                  # Obesity: Estimated body mass index is 32.44 kg/m  as calculated from the following:    Height as of this encounter: 1.676 m (5' 6\").    Weight as of this encounter: 91.2 kg (201 lb).       "

## 2023-07-03 NOTE — ANESTHESIA POSTPROCEDURE EVALUATION
Patient: Adriana Lucia    Procedure: Procedure(s):  HERNIORRHAPHY, UMBILICAL, OPEN       Anesthesia Type:  MAC    Note:  Disposition: Outpatient   Postop Pain Control: Uneventful            Sign Out: Well controlled pain   PONV: Yes            Symptoms: Nausea only   Neuro/Psych: Uneventful            Sign Out: Acceptable/Baseline neuro status   Airway/Respiratory: Uneventful            Sign Out: Acceptable/Baseline resp. status   CV/Hemodynamics: Uneventful            Sign Out: Acceptable CV status; No obvious hypovolemia; No obvious fluid overload   Other NRE:    DID A NON-ROUTINE EVENT OCCUR? No           Last vitals:  Vitals Value Taken Time   /86 07/03/23 0841   Temp 36.8  C (98.2  F) 07/03/23 0841   Pulse 72 07/03/23 0841   Resp 12 07/03/23 0841   SpO2 97 % 07/03/23 0833       Electronically Signed By: Alexandro Rose MD  July 3, 2023  8:56 AM

## 2023-07-03 NOTE — ANESTHESIA CARE TRANSFER NOTE
Patient: Adriana Lucia    Procedure: Procedure(s):  HERNIORRHAPHY, UMBILICAL, OPEN       Diagnosis: Umbilical hernia without obstruction and without gangrene [K42.9]  Diagnosis Additional Information: No value filed.    Anesthesia Type:   MAC     Note:    Oropharynx: oropharynx clear of all foreign objects and spontaneously breathing  Level of Consciousness: drowsy  Oxygen Supplementation: room air    Independent Airway: airway patency satisfactory and stable  Dentition: dentition unchanged  Vital Signs Stable: post-procedure vital signs reviewed and stable  Report to RN Given: handoff report given  Patient transferred to: Phase II    Handoff Report: Identifed the Patient, Identified the Reponsible Provider, Reviewed the pertinent medical history, Discussed the surgical course, Reviewed Intra-OP anesthesia mangement and issues during anesthesia, Set expectations for post-procedure period and Allowed opportunity for questions and acknowledgement of understanding      Vitals:  Vitals Value Taken Time   /79 07/03/23 0803   Temp 36.2  C (97.2  F) 07/03/23 0803   Pulse 79 07/03/23 0803   Resp 14 07/03/23 0803   SpO2 94 % 07/03/23 0803       Electronically Signed By: KANE Aviles CRNA  July 3, 2023  8:05 AM

## 2023-07-03 NOTE — TELEPHONE ENCOUNTER
52 year old s/p open mesh repair umbilical hernia this morning   Just got home and appears her incision was irritated by the seat belt   She has a constant drip coming from the incision.    Instructed to re-enforce the dressing -do not remove the dressing  Apply light pressure and some ice/cold compress      Will forward information to surgery team to follow-up asap to make sure drainage has subsided          Reason for Disposition    Clear or blood-tinged fluid draining from wound and no fever    Additional Information    Negative: Major abdominal surgical incision and wound gaping open with visible internal organs    Negative: Sounds like a life-threatening emergency to the triager    Negative: Bleeding from incision and won't stop after 10 minutes of direct pressure    Negative: Bleeding (more than a few drops) from incision and after blood vessel surgery (e.g., carotidendarterectomy, femoral bypass graft, kidney dialysis fistula, tracheostomy)    Negative: Bright red, wide-spread, sunburn-like rash    Negative: SEVERE pain in the incision    Negative: Incision gaping open and < 2 days (48 hours) since wound re-opened    Negative: Incision gaping open and length of opening > 2 inches (5 cm)    Negative: Patient sounds very sick or weak to the triager    Negative: Sounds like a serious complication to the triager    Negative: Incision gaping open and length of opening > 1/4 inch (6 mm) and on the face and over 2 days since wound re-opened    Negative: Incision gaping open and length of opening > 1/2 inch (1 cm) and over 2 days since wound re-opened    Negative: Scant bleeding (e.g., few drops) from incision and after blood vessel surgery (e.g., carotid endarterectomy, femoral bypass graft, kidney dialysis fistula    Negative: Caller has URGENT question and triager unable to answer question    Negative: Fever > 100.4 F (38.0 C)    Negative: Incision looks infected (spreading redness, pain)    Negative: Red streak  "runs from the incision and longer than 1 inch (2.5 cm)    Negative: Pus or bad-smelling fluid draining from incision    Negative: Dressing soaked with blood or body fluid (e.g., drainage)    Negative: Raised bruise and size > 2 inches (5 cm) and expanding    Answer Assessment - Initial Assessment Questions  1. SYMPTOM: \"What's the main symptom you're concerned about?\" (e.g., drainage, incision opening up, pain, redness)      Drainage from incision  2. ONSET: \"When did drainage  start?\"      On way home from surgery  3. SURGERY: \"What surgery did you have?\"      open mesh repair umbilical hernia  4. DATE of SURGERY: \"When was the surgery?\"       today  5. INCISION SITE: \"Where is the incision located?\"       abdomen  6. REDNESS: \"Is there any redness at the incision site?\" If yes, ask: \"How wide across is the redness?\" (Inches, centimeters)       Covered with dressing  7. PAIN: \"Is there any pain?\" If Yes, ask: \"How bad is it?\"  (Scale 1-10; or mild, moderate, severe)    - NONE (0): no pain    - MILD (1-3): doesn't interfere with normal activities     - MODERATE (4-7): interferes with normal activities or awakens from sleep     - SEVERE (8-10): excruciating pain, unable to do any normal activities      controlled  8. BLEEDING: \"Is there any bleeding?\" If Yes, ask: \"How much?\" and \"Where?\"      Yes drainage from incision    9. DRAINAGE: \"Is there any drainage from the incision site?\" If yes, ask: \"What color and how much?\" (e.g., red, cloudy, pus; drops, teaspoon)      Sero-sanguineous  10. FEVER: \"Do you have a fever?\" If Yes, ask: \"What is your temperature, how was it measured, and when did it start?\"        no  11. OTHER SYMPTOMS: \"Do you have any other symptoms?\" (e.g., dizziness, rash elsewhere on body, shaking chills, weakness)        no    Protocols used: POST-OP INCISION SYMPTOMS AND [id number]      "

## 2023-07-03 NOTE — TELEPHONE ENCOUNTER
Procedure: HERNIORRHAPHY, UMBILICAL, OPEN 7/3/23   Provider:  Dr Louis    Pt's significant other called about bleeding from the incision site, states that she had a few drops of bleeding when she got home from the hospital. She then layed down for awhile and when she got up she had more bleeding - estimate of 3 Tbsp but described as reinforcing the dressing and the 2x2 is full of blood. Pain level 7/10 - too soon for next dose. Pt has tried ice but that hasn't made a difference. Due to additional bleeding, pt would like a call back from the provider/clinic to discuss symptoms and possible same day appt if needed.    Routed to [facility] Surgery pool for follow up.      Reason for Disposition    Clear or blood-tinged fluid draining from wound and no fever    Additional Information    Negative: Major abdominal surgical incision and wound gaping open with visible internal organs    Negative: Sounds like a life-threatening emergency to the triager    Negative: Bleeding from incision and won't stop after 10 minutes of direct pressure    Negative: Bleeding (more than a few drops) from incision and after blood vessel surgery (e.g., carotidendarterectomy, femoral bypass graft, kidney dialysis fistula, tracheostomy)    Negative: Bright red, wide-spread, sunburn-like rash    Negative: SEVERE pain in the incision    Negative: Incision gaping open and < 2 days (48 hours) since wound re-opened    Negative: Incision gaping open and length of opening > 2 inches (5 cm)    Negative: Patient sounds very sick or weak to the triager    Negative: Sounds like a serious complication to the triager    Negative: Fever > 100.4 F (38.0 C)    Negative: Incision looks infected (spreading redness, pain)    Negative: Red streak runs from the incision and longer than 1 inch (2.5 cm)    Negative: Pus or bad-smelling fluid draining from incision    Negative: Dressing soaked with blood or body fluid (e.g., drainage)    Negative: Raised bruise and size > 2  "inches (5 cm) and expanding    Negative: Scant bleeding (e.g., few drops) from incision and after blood vessel surgery (e.g., carotid endarterectomy, femoral bypass graft, kidney dialysis fistula    Negative: Caller has URGENT question and triager unable to answer question    Negative: Incision gaping open and length of opening > 1/4 inch (6 mm) and on the face and over 2 days since wound re-opened    Negative: Incision gaping open and length of opening > 1/2 inch (1 cm) and over 2 days since wound re-opened    Answer Assessment - Initial Assessment Questions  1. SYMPTOM: \"What's the main symptom you're concerned about?\" (e.g., drainage, incision opening up, pain, redness)      Bleeding    2. ONSET: \"When did bleeding start?\"      Some blood when pt arrived home from surgery. Increased after laying down    3. SURGERY: \"What surgery did you have?\"      Herniorrhaphy    4. DATE of SURGERY: \"When was the surgery?\"       7/3/23    5. INCISION SITE: \"Where is the incision located?\"       Umbilical incision    6. REDNESS: \"Is there any redness at the incision site?\" If yes, ask: \"How wide across is the redness?\" (Inches, centimeters)       MADHURI- post-op dressing covering the incision. Pt has 2nd gauze pad next to the original dressing that has blood on it as well    7. PAIN: \"Is there any pain?\" If Yes, ask: \"How bad is it?\"  (Scale 1-10; or mild, moderate, severe)    - NONE (0): no pain    - MILD (1-3): doesn't interfere with normal activities     - MODERATE (4-7): interferes with normal activities or awakens from sleep     - SEVERE (8-10): excruciating pain, unable to do any normal activities      7/10    8. BLEEDING: \"Is there any bleeding?\" If Yes, ask: \"How much?\" and \"Where?\"      About 3 Tbsp out - estimate per sig other    9. DRAINAGE: \"Is there any drainage from the incision site?\" If yes, ask: \"What color and how much?\" (e.g., red, cloudy, pus; drops, teaspoon)      None    10. FEVER: \"Do you have a fever?\" If " "Yes, ask: \"What is your temperature, how was it measured, and when did it start?\"        None    11. OTHER SYMPTOMS: \"Do you have any other symptoms?\" (e.g., dizziness, rash elsewhere on body, shaking chills, weakness)        none    Protocols used: POST-OP INCISION SYMPTOMS AND [id number]      "

## 2023-07-03 NOTE — TELEPHONE ENCOUNTER
Spoke with the patient regarding wound concerns. Reports that her significant other noted that her dress was soiled with blood after hernia repair. She has reinforced the dressing and is applying pressure and a cold compress. She was asked to do this for 15 minutes.  She will call if the wound continues to drain.    She will call with questions or concerns.

## 2023-07-03 NOTE — DISCHARGE INSTRUCTIONS
"[facility]  Home Care Following Anesthesia  For 24 hours after surgery:  Get plenty of rest.  A responsible adult must stay with you for at least 24 hours after you leave the surgery center.  Do not drive or use heavy equipment.  If you have weakness or tingling, don't drive or use heavy equipment until this feeling goes away.   Do not drink alcohol.   Avoid strenuous or risky activities.  Ask for help when climbing stairs.  You may feel lightheaded.  IF so, sit for a few minutes before standing.  Have someone help you get up.   If you have nausea (feel sick to your stomach): Drink only clear liquids such as apple juice, ginger ale, broth or 7-Up.  Rest may also help.  Be sure to drink enough fluids.  Move to a regular diet as you feel able.   You may have a slight fever.  Call the doctor if your fever is over 100 F (37.7 C) (taken under the tongue) or lasts longer than 24 hours.  You may have a dry mouth, a sore throat, muscle aches or trouble sleeping. These should go away after 24 hours.  Do not make important or legal decisions.   It is recommended to avoid smoking.        Today you received a Marcaine or bupivacaine block to numb the nerves near your surgery site.  This is a block using local anesthetic or \"numbing\" medication injected around the nerves to anesthetize or \"numb\" the area supplied by those nerves.  This block is injected into the muscle layer near your surgical site.  The medication may numb the location where you had surgery for 6-18 hours, but may last up to 24 hours.  If your surgical site is an arm or leg you should be careful with your affected limb, since it is possible to injure your limb without being aware of it due to the numbing.  Until full feeling returns, you should guard against bumping or hitting your limb, and avoid extreme hot or cold temperatures on the skin.  As the block wears off, the feeling will return as a tingling or prickly " sensation near your surgical site.  You will experience more discomfort from your incision as the feeling returns.  You may want to take a pain pill (a narcotic or Tylenol if this was prescribed by your surgeon) when you start to experience mild pain before the pain beccomes more severe.  If your pain medications do not control your pain you should notifiy your surgeon.    Tips for taking pain medications  To get the best pain relief possible, remember these points:  Take pain medications as directed, before pain becomes severe.  Pain medication can upset your stomach: taking it with food may help.  Constipation is a common side effect of pain medication. Drink plenty of  fluids.  Eat foods high in fiber. Take a stool softener if recommended by your doctor or pharmacist.  Do not drink alcohol, drive or operate machinery while taking pain medications.  Ask about other ways to control pain, such as with heat, ice or relaxation.    Tylenol/Acetaminophen Consumption    If you feel your pain relief is insufficient, you may take Tylenol/Acetaminophen in addition to your narcotic pain medication.   Be careful not to exceed 4,000 mg of Tylenol/Acetaminophen in a 24 hour period from all sources.  If you are taking extra strength Tylenol/acetaminophen (500 mg), the maximum dose is 8 tablets in 24 hours.  If you are taking regular strength acetaminophen (325 mg), the maximum dose is 12 tablets in 24 hours.    Call a doctor for any of the following:  Signs of infection (fever, growing tenderness at the surgery site, a large amount of drainage or bleeding, severe pain, foul-smelling drainage, redness, swelling).  It has been over 8 to 10 hours since surgery and you are still not able to urinate (pass water).  Headache for over 24 hours.  Numbness, tingling or weakness the day after surgery (if you had spinal anesthesia).  Signs of Covid-19 infection (temperature over 100 degrees, shortness of breath, cough, loss of taste/smell,  generalized body aches, persistent headache, chills, sore throat, nausea/vomiting/diarrhea)  Your doctor is:  Dr. Rei Louis, General Surgery: [phone]                    Or dial [phone] and ask for the resident on call for:  General Surgery  For emergency care, call the:  [facility]:  [phone] (TTY for hearing impaired: [phone])

## 2023-07-03 NOTE — TELEPHONE ENCOUNTER
Dr. Louis called the patient and gave her instructions about reinforcing the dressing and applying pressure to the area for 15-20 minutes relax then do that three times.  If this does not stop the bleeding, she is to call the office back.

## 2023-07-03 NOTE — TELEPHONE ENCOUNTER
[facility]    Phone Message    May a detailed message be left on voicemail: yes     Reason for Call: Other:     Pt's significant other, Tai, is requesting a call back to discuss the bleeding the pt is experiencing post-op. Caller stated the pt has blood coming from underneath the pad over surgical location.    Writer transferred caller to red flag triage nurses.    Action Taken: Message routed to:  [facility] ([facility]  Gen Surg    Travel Screening: Not Applicable

## 2023-07-05 ENCOUNTER — PATIENT OUTREACH (OUTPATIENT)
Dept: SURGERY | Facility: CLINIC | Age: 53
End: 2023-07-05
Payer: COMMERCIAL

## 2023-07-05 NOTE — PROGRESS NOTES
07/05/23    9:39 AM     Adriana Lucia is a patient of Dr. Rei Louis that underwent open ventral/umbilical hernia repair approximately 2 days ago (7/3).  Attempted to contact patient via telephone for a status update and review post-op  teaching.  LM on VM to call office.  Await return call.      Of note:  Pathology:  None  Wound:  Steri-strips  Follow-up:  Routine  Restrictions:  - No strenuous exercise for 3-4 weeks  - No lifting, pushing, pulling more than 15-20 pounds for 3-4 weeks  New medications:  Norco, Senna  Equipment/Supplies:  None

## 2023-07-06 NOTE — PROGRESS NOTES
07/06/23    9:33 AM     Attempted to contact patient x 2 for post procedure telephone call/status update.  LM on VM to call office-contact information provided.    Patient sent xaitmentt message inquiring about wearing an abdominal binder.  Patient may wear binder at her discretion.

## 2023-07-31 ENCOUNTER — ANCILLARY PROCEDURE (OUTPATIENT)
Dept: MAMMOGRAPHY | Facility: CLINIC | Age: 53
End: 2023-07-31
Attending: INTERNAL MEDICINE
Payer: COMMERCIAL

## 2023-07-31 DIAGNOSIS — Z12.31 VISIT FOR SCREENING MAMMOGRAM: ICD-10-CM

## 2023-07-31 PROCEDURE — 77067 SCR MAMMO BI INCL CAD: CPT

## 2023-07-31 PROCEDURE — 77067 SCR MAMMO BI INCL CAD: CPT | Mod: 26 | Performed by: STUDENT IN AN ORGANIZED HEALTH CARE EDUCATION/TRAINING PROGRAM

## 2023-07-31 PROCEDURE — 77063 BREAST TOMOSYNTHESIS BI: CPT | Mod: 26 | Performed by: STUDENT IN AN ORGANIZED HEALTH CARE EDUCATION/TRAINING PROGRAM

## 2023-08-08 ENCOUNTER — VIRTUAL VISIT (OUTPATIENT)
Dept: FAMILY MEDICINE | Facility: CLINIC | Age: 53
End: 2023-08-08
Payer: COMMERCIAL

## 2023-08-08 DIAGNOSIS — U07.1 INFECTION DUE TO 2019 NOVEL CORONAVIRUS: Primary | ICD-10-CM

## 2023-08-08 NOTE — PROGRESS NOTES
Adriana is a 52 year old who is being evaluated via a billable video visit.      How would you like to obtain your AVS? MyChart  If the video visit is dropped, the invitation should be resent by: Text to cell phone: [phone]  Will anyone else be joining your video visit? No        Assessment & Plan   Problem List Items Addressed This Visit    None  Visit Diagnoses     Infection due to 2019 novel coronavirus    -  Primary    Relevant Medications    nirmatrelvir and ritonavir (PAXLOVID) 300 mg/100 mg therapy pack       Discussed recommendation to stop atorvastatin while taking Paxlovid; risk for rhabdomyolysis as per Paxlovid prescribing guidelines. She verbalizes understanding. She has DM Type 2 and reviewed that Paxlovid may reduce efficacy of liraglutide, should monitor BS during treatment. Seek care if severe myalgias, worsening respiratory symptoms, persistent symptoms.   Reviewed isolation recommendations, 5 days isolation, good hygiene in home, avoid socialization. Use mask for additional 5 days after improved.   Review of chart shows Creatinine 6/23 WNL.          No follow-ups on file. Follow up as above.     Humera Schmid, KANE CNP  [facility]    Subjective   Adriana is a 52 year old, presenting for the following health issues:  covid pos next steps    HPI     52 year-old female with history, overweight, hyperlipidemia, HTN exposed to COVID (Mother) last Thursday, tested positive (antigen) this am. Has mild cough, runny nose. Feels like allergies. Had COVID in 2021 with similar symptoms. Tolerated OK, no long term effects. Denies fever, current SOB but does have nagging headache yesterday and today. Onset of symptoms was last night.   LMP 12/22; menopausal    Review of Systems   CONSTITUTIONAL: NEGATIVE for fever, chills, change in weight  INTEGUMENTARY/SKIN: NEGATIVE for worrisome rashes, moles or lesions  ENT/MOUTH: Negative for itchy eyes, eye discharge. ear pain. Mild  scratchy throat today  RESP:mild cough, denies SOB. Able to do usual activities  CV: NEGATIVE for chest pain, palpitations or peripheral edema  GI: NEGATIVE for nausea, abdominal pain, heartburn, or change in bowel habits  MUSCULOSKELETAL: NEGATIVE for significant arthralgias or myalgia  NEURO: NEGATIVE for weakness, dizziness or paresthesias  ENDOCRINE: NEGATIVE for temperature intolerance, skin/hair changes. Has DM type 2. BS OK currently    Current Outpatient Medications   Medication     atorvastatin (LIPITOR) 20 MG tablet     enalapril (VASOTEC) 20 MG tablet     HYDROcodone-acetaminophen (NORCO) 5-325 MG tablet     insulin pen needle (31G X 5 MM) 31G X 5 MM miscellaneous     liraglutide - Weight Management (SAXENDA) 18 MG/3ML pen     nirmatrelvir and ritonavir (PAXLOVID) 300 mg/100 mg therapy pack     pramipexole (MIRAPEX) 1 MG tablet     senna-docusate (SENOKOT-S/PERICOLACE) 8.6-50 MG tablet     SUMAtriptan (IMITREX) 50 MG tablet     vibegron (GEMTESA) 75 MG TABS tablet     VITAMIN D, CHOLECALCIFEROL, PO     No current facility-administered medications for this visit.     Is on atorvastatin      Objective           Vitals:  No vitals were obtained today due to virtual visit.    Physical Exam   GENERAL: Healthy, alert and no distress  EYES: Eyes grossly normal to inspection.  No discharge or erythema, or obvious scleral/conjunctival abnormalities.  RESP: No audible wheeze, cough, or visible cyanosis.  No visible retractions or increased work of breathing.    SKIN: Visible skin clear. No significant rash, abnormal pigmentation or lesions.  NEURO: Cranial nerves grossly intact.  Mentation and speech appropriate for age.  PSYCH: Mentation appears normal, affect normal/bright, judgement and insight intact, normal speech and appearance well-groomed.        Video-Visit Details    Type of service:  Video Visit   Video Start Time: 2:33PM  Video End Time:2:45PM    Originating Location (pt. Location): Home  Distant  Location (provider location):  On-site  Platform used for Video Visit: Nehemiah

## 2023-10-05 DIAGNOSIS — E66.812 OBESITY, CLASS II, BMI 35-39.9: ICD-10-CM

## 2023-10-09 RX ORDER — FLURBIPROFEN SODIUM 0.3 MG/ML
SOLUTION/ DROPS OPHTHALMIC
Qty: 100 EACH | Refills: 1 | Status: SHIPPED | OUTPATIENT
Start: 2023-10-09 | End: 2023-12-05 | Stop reason: ALTCHOICE

## 2023-10-09 NOTE — TELEPHONE ENCOUNTER
insulin pen needle (31G X 5 MM) 31G X 5 MM miscellaneous     Diabetic Supplies Protocol Passed     Warren Spivey MD  Endocrinology, Diabetes, and Metabolism    5/8/2023  [facility]

## 2023-11-03 ENCOUNTER — TELEPHONE (OUTPATIENT)
Dept: ENDOCRINOLOGY | Facility: CLINIC | Age: 53
End: 2023-11-03
Payer: COMMERCIAL

## 2023-11-03 DIAGNOSIS — E66.812 OBESITY, CLASS II, BMI 35-39.9: Primary | ICD-10-CM

## 2023-11-03 NOTE — TELEPHONE ENCOUNTER
PA Initiation    Medication: WEGOVY 1 MG/0.5ML SC SOAJ  Insurance Company: [employer] - Phone [phone] Fax [phone]  Pharmacy Filling the Rx:    Filling Pharmacy Phone:    Filling Pharmacy Fax:    Start Date: 11/3/2023    Key: [id number]

## 2023-11-03 NOTE — TELEPHONE ENCOUNTER
Patient has been on Saxenda and tolerating well. She has 2 pens left, but cannot find refills anywhere. Ok to switch to Wegovy 1mg dose per APPs. Patient is scheduled to see Dr. Spivey next month. Routed for sign off.

## 2023-11-06 NOTE — TELEPHONE ENCOUNTER
Prior Authorization Approval    Medication: WEGOVY 1 MG/0.5ML SC SOAJ  Authorization Effective Date: 11/3/2023  Authorization Expiration Date: 5/31/2024  Approved Dose/Quantity: 2ml/28 days   Reference #: [id number]   Insurance Company: [employer] - Phone [phone] Fax [phone]  Expected CoPay: $ 30  CoPay Card Available:      Financial Assistance Needed: no  Which Pharmacy is filling the prescription:    Pharmacy Notified: no  Patient Notified: pharmacy will notify patient

## 2023-11-21 ENCOUNTER — MYC REFILL (OUTPATIENT)
Dept: PULMONOLOGY | Facility: OTHER | Age: 53
End: 2023-11-21

## 2023-11-21 DIAGNOSIS — G25.81 RESTLESS LEGS SYNDROME (RLS): ICD-10-CM

## 2023-11-24 RX ORDER — PRAMIPEXOLE DIHYDROCHLORIDE 1 MG/1
TABLET ORAL
Qty: 90 TABLET | Refills: 1 | Status: SHIPPED | OUTPATIENT
Start: 2023-11-24 | End: 2024-04-10

## 2023-11-24 NOTE — TELEPHONE ENCOUNTER
Pramipexole (MIRAPEX) 1 mg tab      Last Written Prescription Date:  5/25/23  Last Fill Quantity: 90,   # refills: 1  Last Office Visit: 4/24/23

## 2023-11-27 ENCOUNTER — VIRTUAL VISIT (OUTPATIENT)
Dept: ENDOCRINOLOGY | Facility: CLINIC | Age: 53
End: 2023-11-27
Payer: COMMERCIAL

## 2023-11-27 VITALS — BODY MASS INDEX: 30.29 KG/M2 | WEIGHT: 193 LBS | HEIGHT: 67 IN

## 2023-11-27 DIAGNOSIS — E66.812 OBESITY, CLASS II, BMI 35-39.9: Primary | ICD-10-CM

## 2023-11-27 PROCEDURE — 99213 OFFICE O/P EST LOW 20 MIN: CPT | Mod: VID | Performed by: INTERNAL MEDICINE

## 2023-11-27 ASSESSMENT — PAIN SCALES - GENERAL: PAINLEVEL: NO PAIN (0)

## 2023-11-27 NOTE — PROGRESS NOTES
"    Return Medical Weight Management Note     Adriana Lucia  MRN:  [id number]  :  1970  SIMA:  23    Dear Nadira Colon MD,    I had the pleasure of seeing your patient Adriana Lucia.  She is a 52 year old female who I am continuing to see for treatment of obesity related to:      2022     9:20 AM   --   I have the following health issues associated with obesity High Blood Pressure    High Cholesterol   I have the following symptoms associated with obesity None of the above     CURRENT WEIGHT:   193 lbs 0 oz    Wt Readings from Last 4 Encounters:   23 87.5 kg (193 lb)   23 91.2 kg (201 lb)   23 92.1 kg (203 lb)   23 94.8 kg (209 lb)     Height:  5' 7.008\"  Body Mass Index:  Body mass index is 30.22 kg/m .  Vitals:  B/P: Data Unavailable, P: Data Unavailable    Initial consult weight was 235 on 22.  Weight change since last seen on 23 is down 21 pounds.   Total loss is 42 pounds.    INTERVAL HISTORY:  Felt Saxenda had waned but formerly had reduced eating by \"smaller portions\". Had side effects at first but now have mostly gone.        2022     9:20 AM   Diet Recall Review with Patient   Do you typically eat breakfast? Yes   If you do eat breakfast, what types of food do you eat? Wheat toast with pranut butter   Do you typically eat lunch? Yes   If you do eat lunch, what types of food do you typically eat? [place], or protein bar, or yogurr   Do you typically eat supper? Yes   If you do eat supper, what types of food do you typically eat? Protein, salad or vegetable and a starch   Do you typically eat snacks? No   Do you like vegetables?  Yes   Do you drink water? Yes   How many glasses of juice do you drink in a typical day? 0   How many of glasses of milk do you drink in a typical day? 0   If you do drink milk, what type? N/A   How many 8oz glasses of sugar containing drinks such as Anselmo-Aid/sweet tea do you drink in a day? 0   How many " cans/bottles of sugar pop/soda/tea/sports drinks do you drink in a day? 0   How many cans/bottles of diet pop/soda/tea or sports drink do you drink in a day? 0   How often do you have a drink of alcohol? 2-4 Times a Month   If you do drink, how many drinks might you have in a day? 3-4     MEDICATIONS:   Current Outpatient Medications   Medication    pramipexole (MIRAPEX) 1 MG tablet    atorvastatin (LIPITOR) 20 MG tablet    enalapril (VASOTEC) 20 MG tablet    HYDROcodone-acetaminophen (NORCO) 5-325 MG tablet    insulin pen needle (B-D U/F) 31G X 5 MM miscellaneous    liraglutide - Weight Management (SAXENDA) 18 MG/3ML pen    Semaglutide-Weight Management (WEGOVY) 1 MG/0.5ML pen    senna-docusate (SENOKOT-S/PERICOLACE) 8.6-50 MG tablet    SUMAtriptan (IMITREX) 50 MG tablet    vibegron (GEMTESA) 75 MG TABS tablet    VITAMIN D, CHOLECALCIFEROL, PO     No current facility-administered medications for this visit.         11/26/2023     9:14 AM   Weight Loss Medication History Reviewed With Patient   Which weight loss medications are you currently taking on a regular basis? Wegovy   Are you having any side effects from the weight loss medication that we have prescribed you? No     ASSESSMENT:   Maintain Wegovy in support of food change - focus on no between meal snacking, aggressive lowering of starches and cheese    FOLLOW-UP:    12 weeks.    Sincerely,  Warren Spivey MD

## 2023-11-27 NOTE — NURSING NOTE
Is the patient currently in the state of [place]? YES    Visit mode:VIDEO    If the visit is dropped, the patient can be reconnected by: TELEPHONE VISIT: Phone number: [phone]    Will anyone else be joining the visit? NO  (If patient encounters technical issues they should call [phone]    How would you like to obtain your AVS? MyChart    Are changes needed to the allergy or medication list? Pt stated no changes to allergies and Pt stated no med changes    Reason for visit: Follow Up    Princess STEWART

## 2023-11-27 NOTE — LETTER
"2023       RE: Adriana Lucia  [address] [facility] [address]     Dear Colleague,    Thank you for referring your patient, Adriana Lucia, to the [facility] WEIGHT MANAGEMENT CLINIC [place] at [facility]. Please see a copy of my visit note below.    Virtual Visit Details    Joined the call at 2023, 8:15:55 am.  Left the call at 2023, 8:21:11 am    Originating Location (pt. Location): Home    Distant Location (provider location):  Off-site  Platform used for Video Visit: [facility] Medical Weight Management Note     Adriana Lucia  MRN:  [id number]  :  1970  SIMA:  23    Dear Nadira Colon MD,    I had the pleasure of seeing your patient Adriana Lucia.  She is a 52 year old female who I am continuing to see for treatment of obesity related to:      2022     9:20 AM   --   I have the following health issues associated with obesity High Blood Pressure    High Cholesterol   I have the following symptoms associated with obesity None of the above     CURRENT WEIGHT:   193 lbs 0 oz    Wt Readings from Last 4 Encounters:   23 87.5 kg (193 lb)   23 91.2 kg (201 lb)   23 92.1 kg (203 lb)   23 94.8 kg (209 lb)     Height:  5' 7.008\"  Body Mass Index:  Body mass index is 30.22 kg/m .  Vitals:  B/P: Data Unavailable, P: Data Unavailable    Initial consult weight was 235 on 22.  Weight change since last seen on 23 is down 21 pounds.   Total loss is 42 pounds.    INTERVAL HISTORY:  Felt Saxenda had waned but formerly had reduced eating by \"smaller portions\". Had side effects at first but now have mostly gone.        2022     9:20 AM   Diet Recall Review with Patient   Do you typically eat breakfast? Yes   If you do eat breakfast, what types of food do you eat? Wheat toast with pranut butter   Do you typically eat lunch? Yes   If you do eat lunch, " what types of food do you typically eat? [place], or protein bar, or yogurr   Do you typically eat supper? Yes   If you do eat supper, what types of food do you typically eat? Protein, salad or vegetable and a starch   Do you typically eat snacks? No   Do you like vegetables?  Yes   Do you drink water? Yes   How many glasses of juice do you drink in a typical day? 0   How many of glasses of milk do you drink in a typical day? 0   If you do drink milk, what type? N/A   How many 8oz glasses of sugar containing drinks such as Anselmo-Aid/sweet tea do you drink in a day? 0   How many cans/bottles of sugar pop/soda/tea/sports drinks do you drink in a day? 0   How many cans/bottles of diet pop/soda/tea or sports drink do you drink in a day? 0   How often do you have a drink of alcohol? 2-4 Times a Month   If you do drink, how many drinks might you have in a day? 3-4     MEDICATIONS:   Current Outpatient Medications   Medication    pramipexole (MIRAPEX) 1 MG tablet    atorvastatin (LIPITOR) 20 MG tablet    enalapril (VASOTEC) 20 MG tablet    HYDROcodone-acetaminophen (NORCO) 5-325 MG tablet    insulin pen needle (B-D U/F) 31G X 5 MM miscellaneous    liraglutide - Weight Management (SAXENDA) 18 MG/3ML pen    Semaglutide-Weight Management (WEGOVY) 1 MG/0.5ML pen    senna-docusate (SENOKOT-S/PERICOLACE) 8.6-50 MG tablet    SUMAtriptan (IMITREX) 50 MG tablet    vibegron (GEMTESA) 75 MG TABS tablet    VITAMIN D, CHOLECALCIFEROL, PO     No current facility-administered medications for this visit.         11/26/2023     9:14 AM   Weight Loss Medication History Reviewed With Patient   Which weight loss medications are you currently taking on a regular basis? Wegovy   Are you having any side effects from the weight loss medication that we have prescribed you? No     ASSESSMENT:   Maintain Wegovy in support of food change - focus on no between meal snacking, aggressive lowering of starches and cheese    FOLLOW-UP:    12  weeks.    Sincerely,  Warren Spivey MD

## 2023-11-27 NOTE — PROGRESS NOTES
Virtual Visit Details    Joined the call at 11/27/2023, 8:15:55 am.  Left the call at 11/27/2023, 8:21:11 am    Originating Location (pt. Location): Home    Distant Location (provider location):  Off-site  Platform used for Video Visit: Nehemiah

## 2023-12-05 DIAGNOSIS — E66.812 OBESITY, CLASS II, BMI 35-39.9: ICD-10-CM

## 2023-12-05 NOTE — TELEPHONE ENCOUNTER
Patient requesting refill of Wegovy 1mg dose. She had appointment with Dr. Spivey on 11/27/23. Plan is shown below. Refills were not sent at time of appointment. Wegovy check in questions were answered. Routed to GLENDY/MD for sign off.     ASSESSMENT:   Maintain Wegovy in support of food change - focus on no between meal snacking, aggressive lowering of starches and cheese     FOLLOW-UP:    12 weeks.

## 2024-01-28 ENCOUNTER — MYC REFILL (OUTPATIENT)
Dept: ENDOCRINOLOGY | Facility: CLINIC | Age: 54
End: 2024-01-28
Payer: COMMERCIAL

## 2024-01-28 DIAGNOSIS — E66.812 OBESITY, CLASS II, BMI 35-39.9: ICD-10-CM

## 2024-04-01 DIAGNOSIS — I10 ESSENTIAL HYPERTENSION, BENIGN: ICD-10-CM

## 2024-04-02 RX ORDER — ENALAPRIL MALEATE 20 MG/1
20 TABLET ORAL DAILY
Qty: 90 TABLET | Refills: 0 | Status: SHIPPED | OUTPATIENT
Start: 2024-04-02 | End: 2024-04-29

## 2024-04-02 NOTE — TELEPHONE ENCOUNTER
Last Clinic Visit: 4/24/2023 [facility] with Dr Colon  Future Clinic Visit: 4/29/2024 Dr Colon    *[facility] Visit [facility]: 6/30/2023 with other Family Medicine provider

## 2024-04-03 DIAGNOSIS — G25.81 RESTLESS LEGS SYNDROME (RLS): ICD-10-CM

## 2024-04-08 ENCOUNTER — VIRTUAL VISIT (OUTPATIENT)
Dept: ENDOCRINOLOGY | Facility: CLINIC | Age: 54
End: 2024-04-08
Payer: COMMERCIAL

## 2024-04-08 VITALS — WEIGHT: 179.5 LBS | HEIGHT: 67 IN | BODY MASS INDEX: 28.17 KG/M2

## 2024-04-08 DIAGNOSIS — E66.812 OBESITY, CLASS II, BMI 35-39.9: Primary | ICD-10-CM

## 2024-04-08 PROCEDURE — 99213 OFFICE O/P EST LOW 20 MIN: CPT | Mod: 95 | Performed by: INTERNAL MEDICINE

## 2024-04-08 ASSESSMENT — PAIN SCALES - GENERAL: PAINLEVEL: NO PAIN (0)

## 2024-04-08 NOTE — PROGRESS NOTES
"    Return Medical Weight Management Note     Adriana Lucia  MRN:  [id number]  :  1970  SIMA:  24    Dear Nadira Colon MD,    I had the pleasure of seeing your patient Adriana Lucia.  She is a 52 year old female who I am continuing to see for treatment of obesity related to:      2022     9:20 AM   --   I have the following health issues associated with obesity High Blood Pressure    High Cholesterol   I have the following symptoms associated with obesity None of the above     CURRENT WEIGHT:   179 lbs 8 oz    Wt Readings from Last 4 Encounters:   24 81.4 kg (179 lb 8 oz)   23 87.5 kg (193 lb)   23 91.2 kg (201 lb)   23 92.1 kg (203 lb)     Height:  5' 7\"  Body Mass Index:  Body mass index is 28.11 kg/m .  Vitals:  B/P: Data Unavailable, P: Data Unavailable    Initial consult weight was 235 on 22.  Weight change since last seen on 23 is down 14 pounds.   Total loss is 56 pounds.    INTERVAL HISTORY:  Felt Saxenda had waned and so has changed to Wegovy 1 and now 1.7, formerly had reduced eating by \"smaller portions\". Had side effects at first but now have mostly gone.        2022     9:20 AM   Diet Recall Review with Patient   Do you typically eat breakfast? Yes   If you do eat breakfast, what types of food do you eat? Wheat toast with pranut butter   Do you typically eat lunch? Yes   If you do eat lunch, what types of food do you typically eat? [place], or protein bar, or yogurr   Do you typically eat supper? Yes   If you do eat supper, what types of food do you typically eat? Protein, salad or vegetable and a starch   Do you typically eat snacks? No   Do you like vegetables?  Yes   Do you drink water? Yes   How many glasses of juice do you drink in a typical day? 0   How many of glasses of milk do you drink in a typical day? 0   If you do drink milk, what type? N/A   How many 8oz glasses of sugar containing drinks such as Anselmo-Aid/sweet tea " do you drink in a day? 0   How many cans/bottles of sugar pop/soda/tea/sports drinks do you drink in a day? 0   How many cans/bottles of diet pop/soda/tea or sports drink do you drink in a day? 0   How often do you have a drink of alcohol? 2-4 Times a Month   If you do drink, how many drinks might you have in a day? 3-4     MEDICATIONS:   Current Outpatient Medications   Medication Sig Dispense Refill    pramipexole (MIRAPEX) 1 MG tablet Take 1 tablet by mouth at 1.5-2 hours before bed. 90 tablet 1    atorvastatin (LIPITOR) 20 MG tablet Take 1 tablet (20 mg) by mouth daily 90 tablet 3    enalapril (VASOTEC) 20 MG tablet Take 1 tablet (20 mg) by mouth daily 90 tablet 0    HYDROcodone-acetaminophen (NORCO) 5-325 MG tablet Take 1-2 tablets by mouth every 4 hours as needed for moderate to severe pain 15 tablet 0    Semaglutide-Weight Management (WEGOVY) 1 MG/0.5ML pen Inject 1 mg Subcutaneous once a week 2 mL 1    Semaglutide-Weight Management (WEGOVY) 1.7 MG/0.75ML pen Inject 1.7 mg Subcutaneous once a week 3 mL 11    senna-docusate (SENOKOT-S/PERICOLACE) 8.6-50 MG tablet Take 1-2 tablets by mouth 2 times daily 15 tablet 0    SUMAtriptan (IMITREX) 50 MG tablet Take 1 tablet (50 mg) by mouth at onset of headache for migraine 12 tablet 4    vibegron (GEMTESA) 75 MG TABS tablet Take 1 tablet (75 mg) by mouth daily 90 tablet 3    VITAMIN D, CHOLECALCIFEROL, PO Take 5,000 Units by mouth every other day       No current facility-administered medications for this visit.         11/26/2023     9:14 AM   Weight Loss Medication History Reviewed With Patient   Which weight loss medications are you currently taking on a regular basis? Wegovy   Are you having any side effects from the weight loss medication that we have prescribed you? No     ASSESSMENT:   Maintain Wegovy 1.7 mg/w in support of food change - focus on no between meal snacking, aggressive lowering of starches and cheese    FOLLOW-UP:    12  weeks.    Sincerely,  Warren Spivey MD

## 2024-04-08 NOTE — PROGRESS NOTES
Virtual Visit Details    Joined the call at 4/8/2024, 8:05:42 am.  Left the call at 4/8/2024, 8:09:13 am.    Originating Location (pt. Location): Home    Distant Location (provider location):  Off-site  Platform used for Video Visit: Nehemiah

## 2024-04-08 NOTE — NURSING NOTE
Is the patient currently in the state of [place]? YES    Visit mode:VIDEO    If the visit is dropped, the patient can be reconnected by: VIDEO VISIT: Text to cell phone:   Telephone Information:   Mobile [phone]       Will anyone else be joining the visit? NO  (If patient encounters technical issues they should call [phone] :[phone])    How would you like to obtain your AVS? MyChart    Are changes needed to the allergy or medication list? Pt stated no changes to allergies and Pt stated no med changes  Please remove and meds marked not taking or flagged for removal.         Reason for visit: RECHECK    Wt/ht other than 24 hrs:    Pain more than one location:  no  Cheryl VANEGASF

## 2024-04-08 NOTE — LETTER
"2024       RE: Adriana Lucia  [address] [facility] [address]     Dear Colleague,    Thank you for referring your patient, Adriana Lucia, to the [facility] WEIGHT MANAGEMENT CLINIC [place] at [facility]. Please see a copy of my visit note below.    Virtual Visit Details    Joined the call at 2024, 8:05:42 am.  Left the call at 2024, 8:09:13 am.    Originating Location (pt. Location): Home    Distant Location (provider location):  Off-site  Platform used for Video Visit: [facility] Medical Weight Management Note     Adriana Lucia  MRN:  [id number]  :  1970  SIMA:  24    Dear Nadira Colon MD,    I had the pleasure of seeing your patient Adriana Lucia.  She is a 52 year old female who I am continuing to see for treatment of obesity related to:      2022     9:20 AM   --   I have the following health issues associated with obesity High Blood Pressure    High Cholesterol   I have the following symptoms associated with obesity None of the above     CURRENT WEIGHT:   179 lbs 8 oz    Wt Readings from Last 4 Encounters:   24 81.4 kg (179 lb 8 oz)   23 87.5 kg (193 lb)   23 91.2 kg (201 lb)   23 92.1 kg (203 lb)     Height:  5' 7\"  Body Mass Index:  Body mass index is 28.11 kg/m .  Vitals:  B/P: Data Unavailable, P: Data Unavailable    Initial consult weight was 235 on 22.  Weight change since last seen on 23 is down 14 pounds.   Total loss is 56 pounds.    INTERVAL HISTORY:  Felt Saxenda had waned and so has changed to Wegovy 1 and now 1.7, formerly had reduced eating by \"smaller portions\". Had side effects at first but now have mostly gone.        2022     9:20 AM   Diet Recall Review with Patient   Do you typically eat breakfast? Yes   If you do eat breakfast, what types of food do you eat? Wheat toast with pranut butter   Do you typically eat " lunch? Yes   If you do eat lunch, what types of food do you typically eat? [place], or protein bar, or yogurr   Do you typically eat supper? Yes   If you do eat supper, what types of food do you typically eat? Protein, salad or vegetable and a starch   Do you typically eat snacks? No   Do you like vegetables?  Yes   Do you drink water? Yes   How many glasses of juice do you drink in a typical day? 0   How many of glasses of milk do you drink in a typical day? 0   If you do drink milk, what type? N/A   How many 8oz glasses of sugar containing drinks such as Anselmo-Aid/sweet tea do you drink in a day? 0   How many cans/bottles of sugar pop/soda/tea/sports drinks do you drink in a day? 0   How many cans/bottles of diet pop/soda/tea or sports drink do you drink in a day? 0   How often do you have a drink of alcohol? 2-4 Times a Month   If you do drink, how many drinks might you have in a day? 3-4     MEDICATIONS:   Current Outpatient Medications   Medication Sig Dispense Refill    pramipexole (MIRAPEX) 1 MG tablet Take 1 tablet by mouth at 1.5-2 hours before bed. 90 tablet 1    atorvastatin (LIPITOR) 20 MG tablet Take 1 tablet (20 mg) by mouth daily 90 tablet 3    enalapril (VASOTEC) 20 MG tablet Take 1 tablet (20 mg) by mouth daily 90 tablet 0    HYDROcodone-acetaminophen (NORCO) 5-325 MG tablet Take 1-2 tablets by mouth every 4 hours as needed for moderate to severe pain 15 tablet 0    Semaglutide-Weight Management (WEGOVY) 1 MG/0.5ML pen Inject 1 mg Subcutaneous once a week 2 mL 1    Semaglutide-Weight Management (WEGOVY) 1.7 MG/0.75ML pen Inject 1.7 mg Subcutaneous once a week 3 mL 11    senna-docusate (SENOKOT-S/PERICOLACE) 8.6-50 MG tablet Take 1-2 tablets by mouth 2 times daily 15 tablet 0    SUMAtriptan (IMITREX) 50 MG tablet Take 1 tablet (50 mg) by mouth at onset of headache for migraine 12 tablet 4    vibegron (GEMTESA) 75 MG TABS tablet Take 1 tablet (75 mg) by mouth daily 90 tablet 3    VITAMIN D,  CHOLECALCIFEROL, PO Take 5,000 Units by mouth every other day       No current facility-administered medications for this visit.         11/26/2023     9:14 AM   Weight Loss Medication History Reviewed With Patient   Which weight loss medications are you currently taking on a regular basis? Wegovy   Are you having any side effects from the weight loss medication that we have prescribed you? No     ASSESSMENT:   Maintain Wegovy 1.7 mg/w in support of food change - focus on no between meal snacking, aggressive lowering of starches and cheese    FOLLOW-UP:    12 weeks.    Sincerely,  Warren Spivey MD

## 2024-04-10 ENCOUNTER — TELEPHONE (OUTPATIENT)
Dept: ENDOCRINOLOGY | Facility: CLINIC | Age: 54
End: 2024-04-10
Payer: COMMERCIAL

## 2024-04-10 RX ORDER — PRAMIPEXOLE DIHYDROCHLORIDE 1 MG/1
TABLET ORAL
Qty: 90 TABLET | Refills: 0 | Status: SHIPPED | OUTPATIENT
Start: 2024-04-10 | End: 2024-04-29

## 2024-04-10 NOTE — TELEPHONE ENCOUNTER
Pramipexole Dihydrochloride Oral Tablet 1 MG   Last Written Prescription Date:  11/24/2023  Last Fill Quantity: 90,   # refills: 1  Last Office Visit :  4/24/2023  Future Office visit:  4/29/2024    Routing refill request to provider for review/approval because:  Second Request  Needing updated ALT on file for this med.  Refer to clinic for review   Recent Labs   Lab Test 07/19/22  1731   ALT 21     Deepti Ceja RN  Central Triage Red Flags/Med Refills

## 2024-04-10 NOTE — TELEPHONE ENCOUNTER
Left Voicemail (1st Attempt) and Sent Mychart (1st Attempt) for the patient to call back and schedule the following:    Appointment type: Return [facility]  Provider: Dr. Spivey  Return date: 6 mo (around Oct 2024)  Specialty phone number: [phone]  Additional appointment(s) needed: NA  Additonal Notes: NA

## 2024-04-22 ENCOUNTER — MYC REFILL (OUTPATIENT)
Dept: UROLOGY | Facility: CLINIC | Age: 54
End: 2024-04-22
Payer: COMMERCIAL

## 2024-04-22 DIAGNOSIS — N32.81 OVERACTIVE BLADDER: ICD-10-CM

## 2024-04-23 SDOH — HEALTH STABILITY: PHYSICAL HEALTH: ON AVERAGE, HOW MANY MINUTES DO YOU ENGAGE IN EXERCISE AT THIS LEVEL?: 20 MIN

## 2024-04-23 SDOH — HEALTH STABILITY: PHYSICAL HEALTH: ON AVERAGE, HOW MANY DAYS PER WEEK DO YOU ENGAGE IN MODERATE TO STRENUOUS EXERCISE (LIKE A BRISK WALK)?: 5 DAYS

## 2024-04-23 ASSESSMENT — ANXIETY QUESTIONNAIRES
6. BECOMING EASILY ANNOYED OR IRRITABLE: SEVERAL DAYS
7. FEELING AFRAID AS IF SOMETHING AWFUL MIGHT HAPPEN: NOT AT ALL
8. IF YOU CHECKED OFF ANY PROBLEMS, HOW DIFFICULT HAVE THESE MADE IT FOR YOU TO DO YOUR WORK, TAKE CARE OF THINGS AT HOME, OR GET ALONG WITH OTHER PEOPLE?: NOT DIFFICULT AT ALL
1. FEELING NERVOUS, ANXIOUS, OR ON EDGE: NOT AT ALL
GAD7 TOTAL SCORE: 1
7. FEELING AFRAID AS IF SOMETHING AWFUL MIGHT HAPPEN: NOT AT ALL
5. BEING SO RESTLESS THAT IT IS HARD TO SIT STILL: NOT AT ALL
2. NOT BEING ABLE TO STOP OR CONTROL WORRYING: NOT AT ALL
IF YOU CHECKED OFF ANY PROBLEMS ON THIS QUESTIONNAIRE, HOW DIFFICULT HAVE THESE PROBLEMS MADE IT FOR YOU TO DO YOUR WORK, TAKE CARE OF THINGS AT HOME, OR GET ALONG WITH OTHER PEOPLE: NOT DIFFICULT AT ALL
4. TROUBLE RELAXING: NOT AT ALL
3. WORRYING TOO MUCH ABOUT DIFFERENT THINGS: NOT AT ALL
GAD7 TOTAL SCORE: 1

## 2024-04-23 ASSESSMENT — SOCIAL DETERMINANTS OF HEALTH (SDOH): HOW OFTEN DO YOU GET TOGETHER WITH FRIENDS OR RELATIVES?: ONCE A WEEK

## 2024-04-29 ENCOUNTER — LAB (OUTPATIENT)
Dept: LAB | Facility: CLINIC | Age: 54
End: 2024-04-29
Attending: INTERNAL MEDICINE
Payer: COMMERCIAL

## 2024-04-29 ENCOUNTER — OFFICE VISIT (OUTPATIENT)
Dept: INTERNAL MEDICINE | Facility: CLINIC | Age: 54
End: 2024-04-29
Attending: INTERNAL MEDICINE
Payer: COMMERCIAL

## 2024-04-29 VITALS
DIASTOLIC BLOOD PRESSURE: 78 MMHG | SYSTOLIC BLOOD PRESSURE: 128 MMHG | WEIGHT: 178 LBS | HEART RATE: 88 BPM | HEIGHT: 67 IN | BODY MASS INDEX: 27.94 KG/M2

## 2024-04-29 DIAGNOSIS — N95.2 ATROPHIC VAGINITIS: ICD-10-CM

## 2024-04-29 DIAGNOSIS — Z00.00 PREVENTATIVE HEALTH CARE: ICD-10-CM

## 2024-04-29 DIAGNOSIS — M25.541 JOINT PAIN IN BOTH HANDS: ICD-10-CM

## 2024-04-29 DIAGNOSIS — M25.542 JOINT PAIN IN BOTH HANDS: ICD-10-CM

## 2024-04-29 DIAGNOSIS — I10 ESSENTIAL HYPERTENSION, BENIGN: ICD-10-CM

## 2024-04-29 DIAGNOSIS — G25.81 RESTLESS LEGS SYNDROME (RLS): ICD-10-CM

## 2024-04-29 DIAGNOSIS — Z00.00 PREVENTATIVE HEALTH CARE: Primary | ICD-10-CM

## 2024-04-29 LAB
ALBUMIN SERPL BCG-MCNC: 4.3 G/DL (ref 3.5–5.2)
ALP SERPL-CCNC: 51 U/L (ref 40–150)
ALT SERPL W P-5'-P-CCNC: 17 U/L (ref 0–50)
ANION GAP SERPL CALCULATED.3IONS-SCNC: 11 MMOL/L (ref 7–15)
AST SERPL W P-5'-P-CCNC: 17 U/L (ref 0–45)
BILIRUB SERPL-MCNC: 0.5 MG/DL
BUN SERPL-MCNC: 11.6 MG/DL (ref 6–20)
CALCIUM SERPL-MCNC: 9 MG/DL (ref 8.6–10)
CHLORIDE SERPL-SCNC: 107 MMOL/L (ref 98–107)
CHOLEST SERPL-MCNC: 156 MG/DL
CREAT SERPL-MCNC: 0.68 MG/DL (ref 0.51–0.95)
DEPRECATED HCO3 PLAS-SCNC: 25 MMOL/L (ref 22–29)
EGFRCR SERPLBLD CKD-EPI 2021: >90 ML/MIN/1.73M2
FASTING STATUS PATIENT QL REPORTED: YES
GLUCOSE SERPL-MCNC: 86 MG/DL (ref 70–99)
HDLC SERPL-MCNC: 46 MG/DL
LDLC SERPL CALC-MCNC: 91 MG/DL
NONHDLC SERPL-MCNC: 110 MG/DL
POTASSIUM SERPL-SCNC: 3.8 MMOL/L (ref 3.4–5.3)
PROT SERPL-MCNC: 7.1 G/DL (ref 6.4–8.3)
SODIUM SERPL-SCNC: 143 MMOL/L (ref 135–145)
TRIGL SERPL-MCNC: 97 MG/DL
TSH SERPL DL<=0.005 MIU/L-ACNC: 2.27 UIU/ML (ref 0.3–4.2)

## 2024-04-29 PROCEDURE — 99213 OFFICE O/P EST LOW 20 MIN: CPT | Mod: 25 | Performed by: INTERNAL MEDICINE

## 2024-04-29 PROCEDURE — 84155 ASSAY OF PROTEIN SERUM: CPT

## 2024-04-29 PROCEDURE — 82374 ASSAY BLOOD CARBON DIOXIDE: CPT

## 2024-04-29 PROCEDURE — 36415 COLL VENOUS BLD VENIPUNCTURE: CPT

## 2024-04-29 PROCEDURE — 99396 PREV VISIT EST AGE 40-64: CPT | Performed by: INTERNAL MEDICINE

## 2024-04-29 PROCEDURE — 84443 ASSAY THYROID STIM HORMONE: CPT

## 2024-04-29 PROCEDURE — 99213 OFFICE O/P EST LOW 20 MIN: CPT | Performed by: INTERNAL MEDICINE

## 2024-04-29 PROCEDURE — 83718 ASSAY OF LIPOPROTEIN: CPT

## 2024-04-29 RX ORDER — ESTRADIOL 0.1 MG/G
2 CREAM VAGINAL
Qty: 42.5 G | Refills: 5 | Status: SHIPPED | OUTPATIENT
Start: 2024-04-29

## 2024-04-29 RX ORDER — ENALAPRIL MALEATE 20 MG/1
20 TABLET ORAL DAILY
Qty: 90 TABLET | Refills: 3 | Status: SHIPPED | OUTPATIENT
Start: 2024-04-29

## 2024-04-29 RX ORDER — PRAMIPEXOLE DIHYDROCHLORIDE 1 MG/1
TABLET ORAL
Qty: 90 TABLET | Refills: 3 | Status: SHIPPED | OUTPATIENT
Start: 2024-04-29

## 2024-04-29 NOTE — PROGRESS NOTES
"Preventive Care Visit  [facility]  Nadira Colon MD, Internal Medicine  Apr 29, 2024      Assessment & Plan     Preventative health care  Discussed preventive healthcare needs with patient.  She is up-to-date on recommended vaccines with exception of hepatitis B.  Recommend checking lipid panel as well as TSH and screening for diabetes.  - Lipid Profile; Future  - TSH with free T4 reflex; Future  - Comprehensive metabolic panel; Future    Essential hypertension, benign  Blood pressure is within acceptable range.  Recommend to continue with current medical therapy without changes.  - enalapril (VASOTEC) 20 MG tablet; Take 1 tablet (20 mg) by mouth daily    Restless legs syndrome (RLS)  Refill for Mirapex was given for restless leg syndrome.  - pramipexole (MIRAPEX) 1 MG tablet; Take 1 tablet by mouth at 1.5 to 2 hours before bed.    Atrophic vaginitis  Reviewed management of vaginal dryness related to atrophic vaginitis.  Recommend a trial of estradiol cream.  - estradiol (ESTRACE) 0.1 MG/GM vaginal cream; Place 2 g vaginally twice a week    Joint pain in both hands  Will obtain x-rays of left hand to determine the etiology of her pain.  Suspect arthritis.  Patient will be advised on test results accordingly.  - XR Hand Left G/E 3 Views; Future        BMI  Estimated body mass index is 27.88 kg/m  as calculated from the following:    Height as of this encounter: 1.702 m (5' 7\").    Weight as of this encounter: 80.7 kg (178 lb).       Counseling  Appropriate preventive services were discussed with this patient, including applicable screening as appropriate for fall prevention, nutrition, physical activity, Tobacco-use cessation, weight loss and cognition.  Checklist reviewing preventive services available has been given to the patient.  Reviewed patient's diet, addressing concerns and/or questions.   She is at risk for psychosocial distress and has been provided with " information to reduce risk.           No follow-ups on file.    Ioana Wright is a 53 year old, presenting for the following:  Physical (Annual exam)         Health Care Directive  Patient does not have a Health Care Directive or Living Will: Discussed advance care planning with patient; however, patient declined at this time.    HPI    Patient is here for the preventive visit. She reports that she had a very stressful year. She also has noticed that she is also dealing with vaginal dryness.           4/23/2024   General Health   How would you rate your overall physical health? (!) FAIR   Feel stress (tense, anxious, or unable to sleep) Only a little   (!) STRESS CONCERN      4/23/2024   Nutrition   Three or more servings of calcium each day? Yes   Diet: Low fat/cholesterol   How many servings of fruit and vegetables per day? (!) 2-3   How many sweetened beverages each day? 0-1         4/23/2024   Exercise   Days per week of moderate/strenous exercise 5 days   Average minutes spent exercising at this level 20 min         4/23/2024   Social Factors   Frequency of gathering with friends or relatives Once a week   Worry food won't last until get money to buy more No   Food not last or not have enough money for food? No   Do you have housing?  Yes   Are you worried about losing your housing? No   Lack of transportation? No   Unable to get utilities (heat,electricity)? No         4/23/2024   Fall Risk   Fallen 2 or more times in the past year? No   Trouble with walking or balance? No          4/23/2024   Dental   Dentist two times every year? Yes         4/23/2024   TB Screening   Were you born outside of the [place]? No         Today's PHQ-2 Score:       4/29/2024     8:41 AM   PHQ-2 ( 1999 [employer])   Q1: Little interest or pleasure in doing things 0   Q2: Feeling down, depressed or hopeless 1   PHQ-2 Score 1   Q1: Little interest or pleasure in doing things Not at all   Q2: Feeling down, depressed or hopeless Several  days   PHQ-2 Score 1           4/23/2024   Substance Use   Alcohol more than 3/day or more than 7/wk No   Do you use any other substances recreationally? (!) CANNABIS PRODUCTS     Social History     Tobacco Use    Smoking status: Never    Smokeless tobacco: Former     Quit date: 8/8/2014   Vaping Use    Vaping status: Never Used   Substance Use Topics    Alcohol use: Yes    Drug use: No             4/23/2024   Breast Cancer Screening   Family history of breast, colon, or ovarian cancer? No / Unknown         7/31/2023   LAST FHS-7 RESULTS   1st degree relative breast or ovarian cancer No   Any relative bilateral breast cancer No   Any male have breast cancer No   Any ONE woman have BOTH breast AND ovarian cancer No   Any woman with breast cancer before 50yrs No   2 or more relatives with breast AND/OR ovarian cancer No   2 or more relatives with breast AND/OR bowel cancer No        Mammogram Screening - Mammogram every 1-2 years updated in Health Maintenance based on mutual decision making        4/23/2024   STI Screening   New sexual partner(s) since last STI/HIV test? No     History of abnormal Pap smear: NO - age 30-65 PAP every 5 years with negative HPV co-testing recommended        Latest Ref Rng & Units 4/24/2023     8:03 AM 9/5/2018    11:12 AM 9/5/2018    11:00 AM   PAP / HPV   PAP  Negative for Intraepithelial Lesion or Malignancy (NILM)      PAP (Historical)   NIL     HPV 16 DNA Negative Negative   Negative    HPV 18 DNA Negative Negative   Negative    Other HR HPV Negative Negative   Negative      ASCVD Risk   The 10-year ASCVD risk score (Aaron NORIEGA, et al., 2019) is: 1.8%    Values used to calculate the score:      Age: 53 years      Sex: Female      Is Non- : No      Diabetic: No      Tobacco smoker: No      Systolic Blood Pressure: 128 mmHg      Is BP treated: Yes      HDL Cholesterol: 49 mg/dL      Total Cholesterol: 163 mg/dL           Reviewed and updated as needed  "this visit by Provider                    Past Medical History:   Diagnosis Date    Chronic hypertension with superimposed preeclampsia 2015    HSV (herpes simplex virus) infection     Hyperlipidemia     Hypertension     Overactive bladder 2021    Added automatically from request for surgery [id number]    Restless legs syndrome (RLS) 2020    S/P  section 2015     Past Surgical History:   Procedure Laterality Date     SECTION N/A 2015    Procedure:  SECTION;  Surgeon: Meredith Clark MD;  Location: [facility] L[facility]D    COLONOSCOPY N/A 2022    Procedure: COLONOSCOPY, WITH POLYPECTOMY;  Surgeon: Madelyn Bishop MD;  Location: [facility] OR    HERNIORRHAPHY UMBILICAL N/A 7/3/2023    Procedure: HERNIORRHAPHY, UMBILICAL, OPEN;  Surgeon: Rei Louis MD;  Location: [facility] OR    IMPLANT STIMULATOR SACRAL NERVE STAGE ONE N/A 2021    Procedure: INSERTION, SACRAL NERVE STIMULATOR, STAGE 1;  Surgeon: Eyad Hawk MD;  Location: [facility] OR    IMPLANT STIMULATOR SACRAL NERVE STAGE TWO N/A 03/15/2021    Procedure: INSERTION, SACRAL NERVE STIMULATOR, STAGE 2 Pleaseschedule 2 weeks after stage 1 implant;  Surgeon: Eyad Hawk MD;  Location: [facility] OR    wisdom teeth                Objective    Exam  /78   Pulse 88   Ht 1.702 m (5' 7\")   Wt 80.7 kg (178 lb)   BMI 27.88 kg/m     Estimated body mass index is 27.88 kg/m  as calculated from the following:    Height as of this encounter: 1.702 m (5' 7\").    Weight as of this encounter: 80.7 kg (178 lb).    Physical Exam  GENERAL: alert and no distress  EYES: Eyes grossly normal to inspection, PERRL and conjunctivae and sclerae normal  RESP: lungs clear to auscultation - no rales, rhonchi or wheezes  CV: regular rate and rhythm, normal S1 S2, no S3 or S4, no murmur, click or rub, no peripheral edema  ABDOMEN: soft, nontender and bowel sounds normal  MS: no gross musculoskeletal defects noted, no " edema  SKIN: no suspicious lesions or rashes  NEURO: Normal strength and tone, mentation intact and speech normal        Signed Electronically by: Nadira Colon MD

## 2024-04-29 NOTE — LETTER
4/29/2024       RE: Adriana Lucia  [address] [facility] [address]     Dear Colleague,    Thank you for referring your patient, Adriana Lucia, to the [facility] [place] at [facility]. Please see a copy of my visit note below.    Chief Complaint   Patient presents with    Physical     Annual exam    Crissy Dozier LPN   Answers submitted by the patient for this visit:  LANDEN-7 (Submitted on 4/23/2024)  LANDEN 7 TOTAL SCORE: 1      Preventive Care Visit  [facility]  Nadira Colon MD, Internal Medicine  Apr 29, 2024      Assessment & Plan    Preventative health care  Discussed preventive healthcare needs with patient.  She is up-to-date on recommended vaccines with exception of hepatitis B.  Recommend checking lipid panel as well as TSH and screening for diabetes.  - Lipid Profile; Future  - TSH with free T4 reflex; Future  - Comprehensive metabolic panel; Future    Essential hypertension, benign  Blood pressure is within acceptable range.  Recommend to continue with current medical therapy without changes.  - enalapril (VASOTEC) 20 MG tablet; Take 1 tablet (20 mg) by mouth daily    Restless legs syndrome (RLS)  Refill for Mirapex was given for restless leg syndrome.  - pramipexole (MIRAPEX) 1 MG tablet; Take 1 tablet by mouth at 1.5 to 2 hours before bed.    Atrophic vaginitis  Reviewed management of vaginal dryness related to atrophic vaginitis.  Recommend a trial of estradiol cream.  - estradiol (ESTRACE) 0.1 MG/GM vaginal cream; Place 2 g vaginally twice a week    Joint pain in both hands  Will obtain x-rays of left hand to determine the etiology of her pain.  Suspect arthritis.  Patient will be advised on test results accordingly.  - XR Hand Left G/E 3 Views; Future        BMI  Estimated body mass index is 27.88 kg/m  as calculated from the following:    Height as of  "this encounter: 1.702 m (5' 7\").    Weight as of this encounter: 80.7 kg (178 lb).       Counseling  Appropriate preventive services were discussed with this patient, including applicable screening as appropriate for fall prevention, nutrition, physical activity, Tobacco-use cessation, weight loss and cognition.  Checklist reviewing preventive services available has been given to the patient.  Reviewed patient's diet, addressing concerns and/or questions.   She is at risk for psychosocial distress and has been provided with information to reduce risk.           No follow-ups on file.    Ioana Wright is a 53 year old, presenting for the following:  Physical (Annual exam)         Health Care Directive  Patient does not have a Health Care Directive or Living Will: Discussed advance care planning with patient; however, patient declined at this time.    HPI    Patient is here for the preventive visit. She reports that she had a very stressful year. She also has noticed that she is also dealing with vaginal dryness.           4/23/2024   General Health   How would you rate your overall physical health? (!) FAIR   Feel stress (tense, anxious, or unable to sleep) Only a little   (!) STRESS CONCERN      4/23/2024   Nutrition   Three or more servings of calcium each day? Yes   Diet: Low fat/cholesterol   How many servings of fruit and vegetables per day? (!) 2-3   How many sweetened beverages each day? 0-1         4/23/2024   Exercise   Days per week of moderate/strenous exercise 5 days   Average minutes spent exercising at this level 20 min         4/23/2024   Social Factors   Frequency of gathering with friends or relatives Once a week   Worry food won't last until get money to buy more No   Food not last or not have enough money for food? No   Do you have housing?  Yes   Are you worried about losing your housing? No   Lack of transportation? No   Unable to get utilities (heat,electricity)? No         4/23/2024   Fall " Risk   Fallen 2 or more times in the past year? No   Trouble with walking or balance? No          4/23/2024   Dental   Dentist two times every year? Yes         4/23/2024   TB Screening   Were you born outside of the [place]? No         Today's PHQ-2 Score:       4/29/2024     8:41 AM   PHQ-2 ( 1999 [employer])   Q1: Little interest or pleasure in doing things 0   Q2: Feeling down, depressed or hopeless 1   PHQ-2 Score 1   Q1: Little interest or pleasure in doing things Not at all   Q2: Feeling down, depressed or hopeless Several days   PHQ-2 Score 1           4/23/2024   Substance Use   Alcohol more than 3/day or more than 7/wk No   Do you use any other substances recreationally? (!) CANNABIS PRODUCTS     Social History     Tobacco Use    Smoking status: Never    Smokeless tobacco: Former     Quit date: 8/8/2014   Vaping Use    Vaping status: Never Used   Substance Use Topics    Alcohol use: Yes    Drug use: No             4/23/2024   Breast Cancer Screening   Family history of breast, colon, or ovarian cancer? No / Unknown         7/31/2023   LAST FHS-7 RESULTS   1st degree relative breast or ovarian cancer No   Any relative bilateral breast cancer No   Any male have breast cancer No   Any ONE woman have BOTH breast AND ovarian cancer No   Any woman with breast cancer before 50yrs No   2 or more relatives with breast AND/OR ovarian cancer No   2 or more relatives with breast AND/OR bowel cancer No        Mammogram Screening - Mammogram every 1-2 years updated in Health Maintenance based on mutual decision making        4/23/2024   STI Screening   New sexual partner(s) since last STI/HIV test? No     History of abnormal Pap smear: NO - age 30-65 PAP every 5 years with negative HPV co-testing recommended        Latest Ref Rng & Units 4/24/2023     8:03 AM 9/5/2018    11:12 AM 9/5/2018    11:00 AM   PAP / HPV   PAP  Negative for Intraepithelial Lesion or Malignancy (NILM)      PAP (Historical)   NIL     HPV 16 DNA Negative  "Negative   Negative    HPV 18 DNA Negative Negative   Negative    Other HR HPV Negative Negative   Negative      ASCVD Risk   The 10-year ASCVD risk score (Aaron NORIEGA, et al., 2019) is: 1.8%    Values used to calculate the score:      Age: 53 years      Sex: Female      Is Non- : No      Diabetic: No      Tobacco smoker: No      Systolic Blood Pressure: 128 mmHg      Is BP treated: Yes      HDL Cholesterol: 49 mg/dL      Total Cholesterol: 163 mg/dL           Reviewed and updated as needed this visit by Provider                    Past Medical History:   Diagnosis Date    Chronic hypertension with superimposed preeclampsia 2015    HSV (herpes simplex virus) infection     Hyperlipidemia     Hypertension     Overactive bladder 2021    Added automatically from request for surgery [id number]    Restless legs syndrome (RLS) 2020    S/P  section 2015     Past Surgical History:   Procedure Laterality Date     SECTION N/A 2015    Procedure:  SECTION;  Surgeon: Meredith Clark MD;  Location: [facility] L[facility]    COLONOSCOPY N/A 2022    Procedure: COLONOSCOPY, WITH POLYPECTOMY;  Surgeon: Madelyn Bishop MD;  Location: [facility] OR    HERNIORRHAPHY UMBILICAL N/A 7/3/2023    Procedure: HERNIORRHAPHY, UMBILICAL, OPEN;  Surgeon: Rei Louis MD;  Location: [facility] OR    IMPLANT STIMULATOR SACRAL NERVE STAGE ONE N/A 2021    Procedure: INSERTION, SACRAL NERVE STIMULATOR, STAGE 1;  Surgeon: Eyad Hawk MD;  Location: [facility] OR    IMPLANT STIMULATOR SACRAL NERVE STAGE TWO N/A 03/15/2021    Procedure: INSERTION, SACRAL NERVE STIMULATOR, STAGE 2 Pleaseschedule 2 weeks after stage 1 implant;  Surgeon: Eyad Hawk MD;  Location: [facility] OR    wisdom teeth                Objective   Exam  /78   Pulse 88   Ht 1.702 m (5' 7\")   Wt 80.7 kg (178 lb)   BMI 27.88 kg/m     Estimated body mass index is 27.88 kg/m  as " "calculated from the following:    Height as of this encounter: 1.702 m (5' 7\").    Weight as of this encounter: 80.7 kg (178 lb).    Physical Exam  GENERAL: alert and no distress  EYES: Eyes grossly normal to inspection, PERRL and conjunctivae and sclerae normal  RESP: lungs clear to auscultation - no rales, rhonchi or wheezes  CV: regular rate and rhythm, normal S1 S2, no S3 or S4, no murmur, click or rub, no peripheral edema  ABDOMEN: soft, nontender and bowel sounds normal  MS: no gross musculoskeletal defects noted, no edema  SKIN: no suspicious lesions or rashes  NEURO: Normal strength and tone, mentation intact and speech normal        Signed Electronically by: Nadira Colon MD    "

## 2024-04-29 NOTE — PATIENT INSTRUCTIONS
Thank you for trusting us with your care!     If you need to contact us for questions about:    Symptoms, Scheduling & Medical Questions: Non-urgent (2-3 day response) Louis message, Urgent (needing response today) [phone] (if after 3:30pm next day response)   Prescriptions: Please call your Pharmacy   Billing: [place] [phone] or [facility]:[phone]

## 2024-04-29 NOTE — PROGRESS NOTES
Chief Complaint   Patient presents with    Physical     Annual exam    Crissy Dozier LPN   Answers submitted by the patient for this visit:  LANDEN-7 (Submitted on 4/23/2024)  LANDEN 7 TOTAL SCORE: 1

## 2024-04-30 ENCOUNTER — ANCILLARY PROCEDURE (OUTPATIENT)
Dept: GENERAL RADIOLOGY | Facility: CLINIC | Age: 54
End: 2024-04-30
Attending: INTERNAL MEDICINE
Payer: COMMERCIAL

## 2024-04-30 DIAGNOSIS — M25.541 JOINT PAIN IN BOTH HANDS: ICD-10-CM

## 2024-04-30 DIAGNOSIS — M25.542 JOINT PAIN IN BOTH HANDS: ICD-10-CM

## 2024-04-30 PROCEDURE — 73130 X-RAY EXAM OF HAND: CPT | Mod: LT | Performed by: RADIOLOGY

## 2024-05-28 ENCOUNTER — TELEPHONE (OUTPATIENT)
Dept: ENDOCRINOLOGY | Facility: CLINIC | Age: 54
End: 2024-05-28
Payer: COMMERCIAL

## 2024-05-28 NOTE — LETTER
"May 29, 2024    To: [facility]    RE: Adriana Lucia  [address] [facility] [address]  : 1970  ID#[id number]    To Whom It May Concern,    I am writing on behalf of my patient, Adriana Lucia to document the medical necessity of Wegovy for the treatment of obesity. This letter provides information about the patient's medical history and diagnosis and a statement summarizing my treatment rationale.     Summary of Patient History and Diagnosis  I had the pleasure of seeing your patient Adriana Lucia. She is a 52 year old female who I am continuing to see for treatment of obesity related to: high blood pressure, high cholesterol.     Initial consult weight was 235 on 22.  Weight change since last seen on 23 is down 14 pounds.   Total loss is 56 pounds.     INTERVAL HISTORY:  Felt Saxenda had waned and so has changed to Wegovy 1mg in 2023 and now 1.7mg, formerly had reduced eating by \"smaller portions\". Had side effects at first but now have mostly gone.     Weight loss medication(s) are indicated due to patient having a BMI of at least 27 kg/m2 with the following comorbidities: high blood pressure and high cholesterol.      Estimated body mass index is 27.88 kg/m  as calculated from the following:    Height as of 24: 1.702 m (5' 7\").    Weight as of 24: 80.7 kg (178 lb).      Current weight:   175lbs            Treatment Rationale  Denial Reason(s):   Patient's weight was 240lbs in 2023 when she first started on GLP1 (Saxenda). She switched to Wegovy 1mg dose in 2023. Her weight at that time was 193lbs. Her current weight is 175lbs. She has lost 18lbs while on Wegovy and 65lbs since starting GLP1 medications. Therefore, she has had success and benefit from this medication. We are requesting approval for her to continue the Wegovy.     Duration  Up to 12 months.      Summary  In summary, Wegovy is medically necessary for this patient s " medical condition. Please call my office at [phone] if I can provide you with any additional information to approve my request. I look forward to receiving your timely response and approval of this request.     Sincerely,    Warren Spivey MD      Office Contact:  Zully Garcia [facility]  Diabetes Weight Management Clinic Liaison     [facility]    [email or web address]  [email or web address]    Phone: [phone]  Fax: [phone]

## 2024-05-28 NOTE — TELEPHONE ENCOUNTER
PA Initiation    Medication: WEGOVY 1.7 MG/0.75ML SC SOAJ  Insurance Company: [employer] Non-Specialty PA's - Phone [phone] Fax [phone]  Pharmacy Filling the Rx:    Filling Pharmacy Phone:    Filling Pharmacy Fax:    Start Date: 5/28/2024    Key:[id number]

## 2024-05-29 NOTE — TELEPHONE ENCOUNTER
Medication Appeal Request    Please initiate an appeal for the requested medication: WEGOVY 1.7 MG/0.75ML SC SOAJ    Has a letter of medical necessity been completed in EPIC?   yes    Any additional lab values/information to include?     Would you like to include any research articles?               If yes please include the hyperlink(s) below or fax to    [phone] for Specialty/Retail               [phone] for Infusion/Clinic Administered.                Include the patients name and MRN on the fax cover sheet.

## 2024-05-29 NOTE — TELEPHONE ENCOUNTER
Wegovy renewal denied, please refer to denial letter below. Please advise on how you would like to proceed. If appealed please provide medical rational.

## 2024-05-29 NOTE — TELEPHONE ENCOUNTER
PRIOR AUTHORIZATION DENIED    Medication: WEGOVY 1.7 MG/0.75ML SC SOAJ  Insurance Company: [employer] Non-Specialty PA's - Phone [phone] Fax [phone]  Denial Date: 5/29/2024  Denial Reason(s):   Appeal Information:

## 2024-05-29 NOTE — TELEPHONE ENCOUNTER
Medication Appeal Initiation    Medication: WEGOVY 1.7 MG/0.75ML SC SOAJ  Appeal Start Date:  5/29/2024  Insurance Company: [employer]  Insurance Phone: [phone]  Insurance Fax: [phone]  Comments:

## 2024-05-30 NOTE — TELEPHONE ENCOUNTER
MEDICATION APPEAL APPROVED    Medication: WEGOVY 1.7 MG/0.75ML SC SOAJ  Authorization Effective Date: 5/30/2024  Authorization Expiration Date: 5/30/2025  Approved Dose/Quantity: 3ml/28 days   Reference #: [id number]   [facility] Insurance Company: [employer]  Expected CoPay: $       CoPay Card Available:    Financial Assistance Needed: no  Filling Pharmacy:    Patient Notified: renewal  Comments:

## 2024-06-17 ENCOUNTER — TRANSFERRED RECORDS (OUTPATIENT)
Dept: HEALTH INFORMATION MANAGEMENT | Facility: CLINIC | Age: 54
End: 2024-06-17
Payer: COMMERCIAL

## 2024-07-08 ENCOUNTER — MYC REFILL (OUTPATIENT)
Dept: INTERNAL MEDICINE | Facility: CLINIC | Age: 54
End: 2024-07-08
Payer: COMMERCIAL

## 2024-07-08 DIAGNOSIS — G43.109 MIGRAINE WITH AURA AND WITHOUT STATUS MIGRAINOSUS, NOT INTRACTABLE: ICD-10-CM

## 2024-07-08 RX ORDER — SUMATRIPTAN 50 MG/1
50 TABLET, FILM COATED ORAL
Qty: 12 TABLET | Refills: 4 | Status: CANCELLED | OUTPATIENT
Start: 2024-07-08

## 2024-07-08 NOTE — TELEPHONE ENCOUNTER
----------------------  Last Office Visit :   4/29/2024  [facility]      Future Office visit:  none  ----------------------      Routing refill request to provider for review/approval because:   Request Needs review        Pass/Fail Protocol Criteria:    Serotonin Agonists Rreghy0007/08/2024 09:54 AM   Protocol Details Serotonin Agonist request needs review.    Blood pressure under 140/90 in past 12 months    Recent (12 mo) or future (30 days) visit within the authorizing provider's specialty    Medication is active on med list    Medication indicated for associated diagnosis    Patient is age 18 or older    No active pregnancy on record    No positive pregnancy test in past 12 months

## 2024-07-10 RX ORDER — SUMATRIPTAN 50 MG/1
TABLET, FILM COATED ORAL
Qty: 12 TABLET | Refills: 0 | Status: SHIPPED | OUTPATIENT
Start: 2024-07-10

## 2024-09-26 ENCOUNTER — ANCILLARY PROCEDURE (OUTPATIENT)
Dept: MAMMOGRAPHY | Facility: CLINIC | Age: 54
End: 2024-09-26
Attending: INTERNAL MEDICINE
Payer: COMMERCIAL

## 2024-09-26 DIAGNOSIS — Z12.31 VISIT FOR SCREENING MAMMOGRAM: ICD-10-CM

## 2024-09-26 PROCEDURE — 77063 BREAST TOMOSYNTHESIS BI: CPT | Performed by: RADIOLOGY

## 2024-09-26 PROCEDURE — 77067 SCR MAMMO BI INCL CAD: CPT | Performed by: RADIOLOGY

## 2024-10-25 ENCOUNTER — TRANSFERRED RECORDS (OUTPATIENT)
Dept: HEALTH INFORMATION MANAGEMENT | Facility: CLINIC | Age: 54
End: 2024-10-25
Payer: COMMERCIAL

## 2024-10-25 ENCOUNTER — VIRTUAL VISIT (OUTPATIENT)
Dept: PHARMACY | Facility: CLINIC | Age: 54
End: 2024-10-25
Attending: PHYSICIAN ASSISTANT
Payer: COMMERCIAL

## 2024-10-25 VITALS — HEIGHT: 67 IN | WEIGHT: 174 LBS | BODY MASS INDEX: 27.31 KG/M2

## 2024-10-25 DIAGNOSIS — I10 CHRONIC HYPERTENSION: ICD-10-CM

## 2024-10-25 DIAGNOSIS — Z78.9 TAKES DIETARY SUPPLEMENTS: ICD-10-CM

## 2024-10-25 DIAGNOSIS — N32.81 OVERACTIVE BLADDER: ICD-10-CM

## 2024-10-25 DIAGNOSIS — E66.01 MORBID OBESITY (H): Primary | ICD-10-CM

## 2024-10-25 DIAGNOSIS — G25.81 RESTLESS LEGS SYNDROME (RLS): ICD-10-CM

## 2024-10-25 DIAGNOSIS — E66.812 OBESITY, CLASS II, BMI 35-39.9: Primary | ICD-10-CM

## 2024-10-25 RX ORDER — PROGESTERONE 200 MG/1
200 CAPSULE ORAL DAILY
COMMUNITY
Start: 2024-10-05

## 2024-10-25 ASSESSMENT — PAIN SCALES - GENERAL: PAINLEVEL_OUTOF10: NO PAIN (0)

## 2024-10-25 NOTE — PATIENT INSTRUCTIONS
"Recommendations from today's MTM visit:                                                    Increase Zepbound to 2.4 mg once weekly. Prescription sent to [facility].   Let Nicolasa DaiD know if insurance coverage changes are coming for 2025.     Follow-up: Dr. Spivey on 2/3/24, PharmD after as needed     It was great speaking with you today.  I value your experience and would be very thankful for your time in providing feedback in our clinic survey. In the next few days, you may receive an email or text message from [facility] with a link to a survey related to your  clinical pharmacist.\"     To schedule another MTM appointment, please call the clinic directly or you may call the MTM scheduling line at [phone] or toll-free at [phone].     My Clinical Pharmacist's contact information:                                                      Please feel free to contact me with any questions or concerns you have.      Saira Arias, PharmD, [facility]  Medication Therapy Management (MTM) Pharmacist   [facility]     "

## 2024-10-25 NOTE — NURSING NOTE
Current patient location: work     Is the patient currently in the state of [place]? YES    Visit mode:VIDEO    If the visit is dropped, the patient can be reconnected by: VIDEO VISIT: Text to cell phone:   Telephone Information:   Mobile [phone]       Will anyone else be joining the visit? NO  (If patient encounters technical issues they should call [phone] :[phone])    Are changes needed to the allergy or medication list? Pt stated no changes to allergies and Pt stated no med changes    Are refills needed on medications prescribed by this physician? NO    Rooming Documentation:  Not applicable      Reason for visit: Medication Therapy Management    Wt other than 24 hrs:    Pain more than one location:  no  Cheryl STEWART

## 2024-10-25 NOTE — PROGRESS NOTES
Medication Therapy Management (MTM) Encounter    ASSESSMENT:                            Medication Adherence/Access: No issues identified.    Weight management   Patient would benefit from continuing pharmacotherapy for weight management. Given tolerating well and class hx class III obesity, recommend optimizing GLP1/GIP therapy as data to support most significant weight loss. Patient also realizing benefit from reduction in food noise, increased satiety and reduction of cravings. Education provided on continued dietary and behavioral modifications with focus on protein and water to optimize effectiveness of Zepbound. Can address an insurance changes if needed.     HRT  Stable.    RLS  Stable.    Overactive bladder  Stable.    Supplements   Stable.     Hypertension   Stable.     PLAN:                            Increase Zepbound to 2.4 mg once weekly. Prescription sent to [facility].   Let Saira Arias, PharmD know if insurance coverage changes are coming for 2025.     Follow-up: Dr. Spivey on 2/3/25, PharmD after as needed     SUBJECTIVE/OBJECTIVE:                          Adriana Lucia is a 54 year old female seen for an initial visit. She was referred to me from Dr. Spivey.      Reason for visit: Wegovy follow-up.    Allergies/ADRs: Reviewed in chart  Past Medical History: Reviewed in chart  Tobacco: She reports that she has never smoked. She quit smokeless tobacco use about 10 years ago.  Alcohol: occasionally     Medication Adherence/Access: no issues reported.    Weight Management   Wegovy 1.7 mg once weekly     Patient reports no current medication side effects. Limited side effects with transition to Wegovy - previously on Saxenda and side effects were worse. Slight increase in nausea with increase from 1 mg to 1.7 mg.   Nutrition/Eating Habits: feels effectiveness has been waning over last couple of months, appetite increased. Able to eat more in a given setting, Increase in food noise.  "    Unsure of any insurance coverage changes for 2025. Will look at open enrollment information.     Preference to send prescription to [facility] pharmacy.     Exercise/Activity: .   Medications Tried/Failed:  Saxenda: side effects    Initial Consult Weight: 235 lbs (9/12/22)     Current weight today: 174 lbs 0 oz  Cumulative Weight Loss: -61 lb, -26% from baseline    Wt Readings from Last 4 Encounters:   10/25/24 174 lb (78.9 kg)   04/29/24 178 lb (80.7 kg)   04/08/24 179 lb 8 oz (81.4 kg)   11/27/23 193 lb (87.5 kg)     Estimated body mass index is 27.25 kg/m  as calculated from the following:    Height as of this encounter: 5' 7\" (1.702 m).    Weight as of this encounter: 174 lb (78.9 kg).      HRT  Testosterone and estrogen pellets.   Progesterone 200 mg daily  Just started therapy. Unable to assess efficacy yet.     RLS  Mirapex 1 mg, 1 tablet before bed  Effective for managing symptoms. No issues at this time.     Overactive bladder  Vibegron 75 mg once daily.   Effective for managing symptoms. No issues at this time.   Cost of medication is affordable.     Supplements   Vitamin D 5000 international unit(s) daily   No reported issues at this time.      Hypertension   Enalapril 20 mg daily  Patient reports no current medication side effects  Patient does not self-monitor blood pressure.           Today's Vitals: Ht 5' 7\" (1.702 m)   Wt 174 lb (78.9 kg)   BMI 27.25 kg/m    ----------------    I spent 15 minutes with this patient today. All changes were made via collaborative practice agreement with Warren Spivey MD. A copy of the visit note was provided to the patient's provider(s).    A summary of these recommendations was sent via clinic portal.    Saira Arias, PharmD, BCACP  Medication Therapy Management (MTM) Pharmacist   [facility]     Telemedicine Visit Details  The patient's medications can be safely assessed via a telemedicine encounter.  Type of " service:  Video Conference via [employer]  Originating Location (pt. Location): Home    Distant Location (provider location):  Off-site  Start Time:  11:04 AM  End Time: 11:19 AM     Medication Therapy Recommendations  Morbid obesity (H)   1 Rationale: Dose too low - Dosage too low - Effectiveness   Recommendation: Increase Dose   Status: Accepted per CPA   Identified Date: 10/25/2024 Completed Date: 10/25/2024

## 2024-10-25 NOTE — Clinical Note
Hi Dr. Spivey,   I met with Adriana today to check in on Wegovy. Overall things are going well, she's noticing increased food noise and larger portions with current 1.7 mg dose so we increased to 2.4 mg. She's scheduled to follow-up with you 2/3/25.   Let me know if you have any questions or concerns,  Saira Arias, PharmD, [facility] Medication Therapy Management (MTM) Pharmacist  [facility]

## 2024-12-02 ENCOUNTER — TRANSFERRED RECORDS (OUTPATIENT)
Dept: HEALTH INFORMATION MANAGEMENT | Facility: CLINIC | Age: 54
End: 2024-12-02
Payer: COMMERCIAL

## 2025-01-13 ENCOUNTER — VIRTUAL VISIT (OUTPATIENT)
Dept: ENDOCRINOLOGY | Facility: CLINIC | Age: 55
End: 2025-01-13
Payer: COMMERCIAL

## 2025-01-13 VITALS — BODY MASS INDEX: 26.84 KG/M2 | HEIGHT: 67 IN | WEIGHT: 171 LBS

## 2025-01-13 DIAGNOSIS — E66.01 MORBID OBESITY (H): ICD-10-CM

## 2025-01-13 PROCEDURE — 98004 SYNCH AUDIO-VIDEO EST SF 10: CPT | Performed by: INTERNAL MEDICINE

## 2025-01-13 ASSESSMENT — PAIN SCALES - GENERAL: PAINLEVEL_OUTOF10: NO PAIN (0)

## 2025-01-13 NOTE — LETTER
"2025       RE: Adriana Lucia  [address] [facility] [address]     Dear Colleague,    Thank you for referring your patient, Adriana Lucia, to the [facility] [place] [facility]. Please see a copy of my visit note below.    Virtual Visit Details    Joined the call at 2025, 8:10:39 am.  Left the call at 2025, 8:14:51 am.    Originating Location (pt. Location): Home    Distant Location (provider location):  Off-site  Platform used for Video Visit: [facility] Medical Weight Management Note     Adriana Lucia  MRN:  [id number]  :  1970  SIMA:  25    Dear Nadira Colon MD,    I had the pleasure of seeing your patient Adriana Lucia.  She is a 52 year old female who I am continuing to see for treatment of obesity related to:      2022     9:20 AM   --   I have the following health issues associated with obesity High Blood Pressure    High Cholesterol   I have the following symptoms associated with obesity None of the above     CURRENT WEIGHT:   171 lbs 0 oz    Wt Readings from Last 4 Encounters:   25 77.6 kg (171 lb)   10/25/24 78.9 kg (174 lb)   24 80.7 kg (178 lb)   24 81.4 kg (179 lb 8 oz)     Height:  5' 7.008\"  Body Mass Index:  Body mass index is 26.78 kg/m .  Vitals:  B/P: Data Unavailable, P: Data Unavailable    Initial consult weight was 235 on 22.  Weight change since last seen on 24 is down 8 pounds.   Total loss is 64 pounds.    INTERVAL HISTORY:  Feels maybe has plateaued now on Wegovy 2.4, formerly had reduced eating by \"smaller portions\". Had side effects at first but now have mostly gone.        2022     9:20 AM   Diet Recall Review with Patient   Do you typically eat breakfast? Yes   If you do eat breakfast, what types of food do you eat? Wheat toast with pranut butter   Do you typically eat lunch? Yes   If " you do eat lunch, what types of food do you typically eat? [place], or protein bar, or yogurr   Do you typically eat supper? Yes   If you do eat supper, what types of food do you typically eat? Protein, salad or vegetable and a starch   Do you typically eat snacks? No   Do you like vegetables?  Yes   Do you drink water? Yes   How many glasses of juice do you drink in a typical day? 0   How many of glasses of milk do you drink in a typical day? 0   If you do drink milk, what type? N/A   How many 8oz glasses of sugar containing drinks such as Anselmo-Aid/sweet tea do you drink in a day? 0   How many cans/bottles of sugar pop/soda/tea/sports drinks do you drink in a day? 0   How many cans/bottles of diet pop/soda/tea or sports drink do you drink in a day? 0   How often do you have a drink of alcohol? 2-4 Times a Month   If you do drink, how many drinks might you have in a day? 3-4     MEDICATIONS:   Current Outpatient Medications   Medication Sig Dispense Refill     atorvastatin (LIPITOR) 20 MG tablet Take 1 tablet (20 mg) by mouth daily 90 tablet 3     enalapril (VASOTEC) 20 MG tablet Take 1 tablet (20 mg) by mouth daily 90 tablet 3     pramipexole (MIRAPEX) 1 MG tablet Take 1 tablet by mouth at 1.5 to 2 hours before bed. 90 tablet 3     progesterone (PROMETRIUM) 200 MG capsule Take 200 mg by mouth daily.       Semaglutide-Weight Management (WEGOVY) 2.4 MG/0.75ML pen Inject 2.4 mg subcutaneously once a week. 3 mL 3     SUMAtriptan (IMITREX) 50 MG tablet Take 1 tablet by mouth at onset of headache for migraine 12 tablet 0     vibegron (GEMTESA) 75 MG TABS tablet Take 1 tablet (75 mg) by mouth daily 90 tablet 3     VITAMIN D, CHOLECALCIFEROL, PO Take 5,000 Units by mouth every other day       No current facility-administered medications for this visit.         11/26/2023     9:14 AM   Weight Loss Medication History Reviewed With Patient   Which weight loss medications are you currently taking on a regular basis? Wegovy    Are you having any side effects from the weight loss medication that we have prescribed you? No     ASSESSMENT:   She is satisfied with current weight. Maintain Wegovy 2.4 mg/w in support of food change - focus on no between meal snacking, aggressive lowering of starches and cheese    FOLLOW-UP:    12 weeks.    Sincerely,  Warren Spivey MD      Again, thank you for allowing me to participate in the care of your patient.      Sincerely,    Warren Spivey MD

## 2025-01-13 NOTE — NURSING NOTE
Current patient location: Patient declined to provide     Is the patient currently in the state of [place]? YES    Visit mode: VIDEO    If the visit is dropped, the patient can be reconnected by:VIDEO VISIT: Text to cell phone:   Telephone Information:   Mobile [phone]       Will anyone else be joining the visit? NO  (If patient encounters technical issues they should call [phone]    Are changes needed to the allergy or medication list? No    Are refills needed on medications prescribed by this physician? NO    Rooming Documentation:  Questionnaire(s) completed    Reason for visit: RECHEMIR VANEGASF

## 2025-01-13 NOTE — PROGRESS NOTES
Virtual Visit Details    Joined the call at 1/13/2025, 8:10:39 am.  Left the call at 1/13/2025, 8:14:51 am.    Originating Location (pt. Location): Home    Distant Location (provider location):  Off-site  Platform used for Video Visit: Nehemiah

## 2025-01-13 NOTE — PROGRESS NOTES
"    Return Medical Weight Management Note     Adriana Lucia  MRN:  [id number]  :  1970  SIMA:  25    Dear Nadira Colon MD,    I had the pleasure of seeing your patient Adriana Lucia.  She is a 52 year old female who I am continuing to see for treatment of obesity related to:      2022     9:20 AM   --   I have the following health issues associated with obesity High Blood Pressure    High Cholesterol   I have the following symptoms associated with obesity None of the above     CURRENT WEIGHT:   171 lbs 0 oz    Wt Readings from Last 4 Encounters:   25 77.6 kg (171 lb)   10/25/24 78.9 kg (174 lb)   24 80.7 kg (178 lb)   24 81.4 kg (179 lb 8 oz)     Height:  5' 7.008\"  Body Mass Index:  Body mass index is 26.78 kg/m .  Vitals:  B/P: Data Unavailable, P: Data Unavailable    Initial consult weight was 235 on 22.  Weight change since last seen on 24 is down 8 pounds.   Total loss is 64 pounds.    INTERVAL HISTORY:  Feels maybe has plateaued now on Wegovy 2.4, formerly had reduced eating by \"smaller portions\". Had side effects at first but now have mostly gone.        2022     9:20 AM   Diet Recall Review with Patient   Do you typically eat breakfast? Yes   If you do eat breakfast, what types of food do you eat? Wheat toast with pranut butter   Do you typically eat lunch? Yes   If you do eat lunch, what types of food do you typically eat? [place], or protein bar, or yogurr   Do you typically eat supper? Yes   If you do eat supper, what types of food do you typically eat? Protein, salad or vegetable and a starch   Do you typically eat snacks? No   Do you like vegetables?  Yes   Do you drink water? Yes   How many glasses of juice do you drink in a typical day? 0   How many of glasses of milk do you drink in a typical day? 0   If you do drink milk, what type? N/A   How many 8oz glasses of sugar containing drinks such as Anselmo-Aid/sweet tea do you drink in a " day? 0   How many cans/bottles of sugar pop/soda/tea/sports drinks do you drink in a day? 0   How many cans/bottles of diet pop/soda/tea or sports drink do you drink in a day? 0   How often do you have a drink of alcohol? 2-4 Times a Month   If you do drink, how many drinks might you have in a day? 3-4     MEDICATIONS:   Current Outpatient Medications   Medication Sig Dispense Refill    atorvastatin (LIPITOR) 20 MG tablet Take 1 tablet (20 mg) by mouth daily 90 tablet 3    enalapril (VASOTEC) 20 MG tablet Take 1 tablet (20 mg) by mouth daily 90 tablet 3    pramipexole (MIRAPEX) 1 MG tablet Take 1 tablet by mouth at 1.5 to 2 hours before bed. 90 tablet 3    progesterone (PROMETRIUM) 200 MG capsule Take 200 mg by mouth daily.      Semaglutide-Weight Management (WEGOVY) 2.4 MG/0.75ML pen Inject 2.4 mg subcutaneously once a week. 3 mL 3    SUMAtriptan (IMITREX) 50 MG tablet Take 1 tablet by mouth at onset of headache for migraine 12 tablet 0    vibegron (GEMTESA) 75 MG TABS tablet Take 1 tablet (75 mg) by mouth daily 90 tablet 3    VITAMIN D, CHOLECALCIFEROL, PO Take 5,000 Units by mouth every other day       No current facility-administered medications for this visit.         11/26/2023     9:14 AM   Weight Loss Medication History Reviewed With Patient   Which weight loss medications are you currently taking on a regular basis? Wegovy   Are you having any side effects from the weight loss medication that we have prescribed you? No     ASSESSMENT:   She is satisfied with current weight. Maintain Wegovy 2.4 mg/w in support of food change - focus on no between meal snacking, aggressive lowering of starches and cheese    FOLLOW-UP:    12 weeks.    Sincerely,  Warren Spivey MD

## 2025-01-22 ENCOUNTER — TELEPHONE (OUTPATIENT)
Dept: ENDOCRINOLOGY | Facility: CLINIC | Age: 55
End: 2025-01-22
Payer: COMMERCIAL

## 2025-01-22 NOTE — TELEPHONE ENCOUNTER
Left Voicemail (1st Attempt) and Sent Bitbrainshart (1st Attempt) for the patient to call back and schedule the following:    Appointment type: Return Weight Management  Appointment mode: In Person or Virtual Visit  Provider: Dr. Warren Spivey  Return date: Approx. 7/13/2025  Specialty phone number: [phone]    Additional Notes:

## 2025-02-03 DIAGNOSIS — E66.01 MORBID OBESITY (H): ICD-10-CM

## 2025-02-04 NOTE — TELEPHONE ENCOUNTER
Adriana is currently tolerating 2.4 mg Wegovy per most recent visit note with Dr. Mayorga and is effective.  Will approve 3 refills per CPA with Dr. Efren Mayorga.  Due for MTM in March will MyChart to schedule.  Last visit with Saira Arias PharmD 10/25/24.     Signed Prescriptions:                        Disp   Refills    Semaglutide-Weight Management (WEGOVY) 2.4*3 mL   2        Sig: Inject 2.4 mg subcutaneously once a week.  Authorizing Provider: EFREN MAYORGA  Ordering User: [facility]     Alison Mcfadden, PharmD    Medication Therapy Management Pharmacist  [facility]

## 2025-02-05 DIAGNOSIS — E66.01 MORBID OBESITY (H): ICD-10-CM

## 2025-02-05 NOTE — TELEPHONE ENCOUNTER
Hello,    It looks like patient's wegovy rx was approved in a previous encounter, however it looks like it never got released. Could this be released to pharmacy?     Thank you,  Jovita Olmedo [facility]  Pharmacy Technician III - Specialty  [facility], [facility].    Phone: [phone]

## 2025-02-06 NOTE — TELEPHONE ENCOUNTER
RX already sent 2/4/2025. Sending message to Pharmacy Liaison to release RX, must be held up in [facility].

## 2025-02-06 NOTE — TELEPHONE ENCOUNTER
RX never reached MS[facility], computer error. Reordering.     Lauren Bloch, PharmD, BCACP   Medication Therapy Management Pharmacist   [facility]

## 2025-03-11 DIAGNOSIS — N32.81 OVERACTIVE BLADDER: ICD-10-CM

## 2025-03-12 ENCOUNTER — VIRTUAL VISIT (OUTPATIENT)
Dept: PHARMACY | Facility: CLINIC | Age: 55
End: 2025-03-12
Attending: INTERNAL MEDICINE
Payer: COMMERCIAL

## 2025-03-12 ENCOUNTER — TELEPHONE (OUTPATIENT)
Dept: ENDOCRINOLOGY | Facility: CLINIC | Age: 55
End: 2025-03-12
Payer: COMMERCIAL

## 2025-03-12 VITALS — WEIGHT: 170 LBS | BODY MASS INDEX: 26.68 KG/M2 | HEIGHT: 67 IN

## 2025-03-12 DIAGNOSIS — E66.01 MORBID OBESITY (H): Primary | ICD-10-CM

## 2025-03-12 NOTE — PROGRESS NOTES
Medication Therapy Management (MTM) Encounter    ASSESSMENT:                            Medication Adherence/Access: See below for considerations.    Weight Management   Adriana would benefit from transitioning therapy with Wegovy to Zepbound for weight management given she is tolerating, has been effective (- 27.6 %), and history of obesity.  Recommend maintaining GLP1/GIP therapy as data to support most significant weight loss therapy available. Adriana is experiencing some benefit from reduction in food noise, cravings, and appetite, and increased satiety. She has been maintaining current weight for a few months, even with optimizing dose of Wegovy to 2.4 mg, and Wegovy has lost its effect for food noise / cravings, therefore will attempt coverage for Zepbound and transition if covered.  Education provided on continued dietary and behavioral modifications with focus on protein intake and strength training exercises to optimize effectiveness of Wegovy.      PLAN:                            Provider Plan:     Transition to Zepbound therapy if covered by insurance     Patient Plan:      Continue Wegovy 2.4 mg weekly for now    Order placed for Zepbound 2.5 mg weekly to [facility] ([phone])  - this will initiate the Prior Authorization, once coverage determined order will release to pharmacy  - will provide updates on Prior Authorization   - review Zepbound information  - consider signing up for Zepbound co-pay card if approved     Protein goal:  20-30 grams protein / meal, minimum 60 gm, ideally 90 gm per day   - try to eat within 60 min of waking up for the day, include protein    - start with protein portion of your  meal, then veggie / fiber, then starch    - try to eat slowly to prevent getting overly full with Wegovy therapy    Water goal:  at least 64 oz per day   - Keep up the great work!      Increase strength training to maintain muscle or reduce muscle loss with weight loss   - Review at  home exercises      Follow-up:  - Next MTM dependent on Zepbound start  -[employer] message with scheduling tool      - Next provider visit:  7/28/25 Dr. Spivey       SUBJECTIVE/OBJECTIVE:                          Adriana Lucia is a 54 year old female seen for an initial visit. She was referred to me from Dr. Spivey.  Previously following with Saira Arias, PharmD     Reason for visit: Wegovy medical weight management.    Allergies/ADRs: Reviewed in chart  Past Medical History: Reviewed in chart  Tobacco: She reports that she has never smoked. She quit smokeless tobacco use about 10 years ago.  Alcohol: not currently using, rare  Caffeine: 1 cup coffee each morning    Medication Adherence/Access: no issues reported.  MEDICATION APPEAL APPROVED     Medication: WEGOVY 1.7 MG/0.75ML SC SOAJ  Authorization Effective Date: 5/30/2024  Authorization Expiration Date: 5/30/2025  Approved Dose/Quantity: 3ml/28 days   Reference #: [id number]   [employer]: [employer] Wegovy co-pay: $ 0 (with discount card)     Weight Management   - Wegovy 2.4 mg once weekly  on Thursday   - med start:  11/2023, dose start: 11/2024    - transition from Saxenda (liraglutide)     No injection site reaction, no nausea, no vomiting, no constipation (baseline: 1 BM / day, now at baseline unless not getting enough water then can have constipation), no diarrhea, some heartburn (only when eats food known to cause symptoms), no headache, and no fatigue. Experiencing appetite suppression / portion reduction (not as effective as it was), and not really reducing food noise / cravings (used to but not anymore).  Did not have significant side effects when started Wegovy or with dose increases, did have side effects with Saxenda (liraglutide) start, nausea / sweats / fatigue for first month.  Does feel slight wearing off of medication effectiveness 2-3 days prior to next dose due.  Fluctuates from 168 - 173 lb but typically 170  "lb at for few months. Originally started therapy to lose weight for hernia surgery, 40 lb in 6 months, which she achieved and had surgery.  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  Met with dietician previously but feels has learned a lot and been sticking with it, not interested in additional visits.     Nutrition/Eating Habits:  portions were biggest issue not food selection, was eating large amounts,   Meals:      - Breakfast:  cottage cheese, granola, yogurt     - Lunch:  apple/orange, half sandwich    - Dinner:  lean meat, veggie, salad    - Snacks: minimal, nuts/dried fruit, apple    - Water per day:  20 Oz mug, at least 3 / day at work, one cup at home (80-90 Oz / day)  Sleep:  about 7 hrs / night, rested   Exercise/Activity: can be on feet at work (sometimes sit half /half), 9 yr old son keeps her busy, walk dog, trying to get back into roller skating  Medications Tried/Failed:  Saxenda: side effects, nausea / sweats     - Last provider visit:  1/13/25 Dr. Spivey    - Next provider visit:  7/28/25 Dr. Spivey      Initial Consult Weight: 235 lb (9/12/22)  Adriana Goal weight: 160- 165 lb      Current weight today: 170 lbs 0 oz  Cumulative Weight Loss: -65 lb, -27.6% from baseline    Wt Readings from Last 4 Encounters:   03/12/25 170 lb (77.1 kg)   01/13/25 171 lb (77.6 kg)   10/25/24 174 lb (78.9 kg)   04/29/24 178 lb (80.7 kg)     Today's Vitals: Ht 5' 7.01\" (1.702 m)   Wt 170 lb (77.1 kg)   BMI 26.62 kg/m    ----------------    I spent 48 minutes with this patient today. All changes were made via collaborative practice agreement with Warren Spivey.     A summary of these recommendations was sent via [employer].    Alison Arzola, PharmD    Medication Therapy Management Pharmacist  [facility]      Telemedicine Visit Details  The patient's medications can be safely assessed via a telemedicine encounter.  Type of service:  Telephone visit  Originating Location " (pt. Location): Home    Distant Location (provider location):  Off-site  Start Time: 1:08 PM  End Time: 1:56 PM     Medication Therapy Recommendations  Morbid obesity (H)   1 Current Medication: Semaglutide-Weight Management (WEGOVY) 2.4 MG/0.75ML pen   Current Medication Sig: Inject 2.4 mg subcutaneously once a week.   Rationale: More effective medication available - Ineffective medication - Effectiveness   Recommendation: Change Medication - Zepbound 2.5 MG/0.5ML Soaj   Status: Accepted per CPA   Identified Date: 3/12/2025 Completed Date: 3/18/2025

## 2025-03-12 NOTE — Clinical Note
Tolerating Wegovy but has not found additional weight loss with optimizing dose to 2.4 mg.  Planned to attempt coverage for Zepbound and transition if approved, has been approved and will transition. With new medication start have messaged to schedule next MTM for May, Dr. Spivey 7/28 - Alison PharmD, MTM

## 2025-03-12 NOTE — PATIENT INSTRUCTIONS
"Recommendations from MTM Pharmacist visit:                                                    MTM (medication therapy management) is a service provided by a clinical pharmacist designed to help you get the most of out of your medicines.  You may be sent a phone or email survey evaluating today's visit.  Please provide feedback you have for the service he received today if you are able.      Continue Wegovy 2.4 mg weekly for now    Order placed for Zepbound 2.5 mg weekly to [facility] ([phone])  - this will initiate the Prior Authorization, once coverage determined order will release to pharmacy  - will provide updates on Prior Authorization   - review Zepbound information (see below)   - consider signing up for Zepbound co-pay card if approved     Protein goal:  20-30 grams protein / meal, minimum 60 gm, ideally 90 gm per day   - try to eat within 60 min of waking up for the day, include protein    - start with protein portion of your  meal, then veggie / fiber, then starch    - try to eat slowly to prevent getting overly full with Wegovy therapy    Water goal:  at least 64 oz per day   - Keep up the great work!      Increase strength training to maintain muscle or reduce muscle loss with weight loss   - Review at home exercises (see below)      Follow-up:  - Next MTM dependent on Zepbound start  -Clavister message with scheduling tool      - Next provider visit:  7/28/25 Dr. Spivey       It was great speaking with you today.  I value your experience and would be very thankful for your time in providing feedback in our clinic survey. In the next few days, you may receive an email or text message from [employer] with a link to a survey related to your  clinical pharmacist.\"     To schedule another MTM appointment, please call the clinic directly ([facility] Phone Number: [phone] (schedules for [facility] and [facility] - providers, " From: Aarti Londono  To: Meaghan Ambrosio PA-C  Sent: 12/24/2018 6:46 AM CST  Subject: Lab Test or Test Related Question    Ramon Harding,    Can you enter orders for my annual labs/mammogram(I think I'm a few mos over due)?    Happy Holidays    Hanna   dietitians, health coaches) or you may call the [facility] scheduling line at [phone] or toll-free at [phone].     My Clinical Pharmacist's contact information:                                                      Please feel free to contact me with any questions or concerns you have.      Alison Arzola, PharmD    Medication Therapy Management Pharmacist   [facility]    Exercise Recommendations set by The American Heart Association:   Get at least 150 minutes per week of moderate-intensity aerobic activity or 75 minutes per week of vigorous aerobic activity, or a combination of both, preferably spread throughout the week.  Add moderate- to high-intensity muscle-strengthening activity (such as resistance or weights) on at least 2 days per week.  Spend less time sitting. Even light-intensity activity can offset some of the risks of being sedentary.  Gain even more benefits by being active at least 300 minutes (5 hours) per week.  Increase amount and intensity gradually over time.    Community Fitness Resources:  Mocavo at [facility]  Do you want the guidance of a  or the moral support of a team? Sign up for a no-cost community class at [facility].  https://Dom[employer]/concepcion/stpaul/    Size-Inclusive Gyms and Fitness Centers:  Fitness spaces that advertise as size-inclusive or have trainers trained in size-inclusive fitness.   [facility] [place]  Balance for Life - [facility] Strength - [place]       Adaptive Fitness Resources  Adaptive Recreation at [facility]: https://www.[facility].org/recreation/adaptive-recreation/#2023-programs     From their website: Join us for an active session of adaptive fitness!  This class is intended for individuals of all ages with a developmental disability. Participants will enjoy staying active through various fitness games and activities, modified  for all abilities.  We will focus on learning different exercises and techniques within a fun group dynamic!      [place] for Resources:  Https://www.SafehisforresClicDataces.org/     From their website: [facility] offers a variety of adaptive recreation programs speci?robert adapted to meet the needs of people with developmental disabilities in [place] and the surrounding areas! The programs are designed to promote physical health, gain con?dence, enhance team building skills and to have fun! [facility] offers a non-competitive environment and programs are designed for a variety of ages.     Adaptive Sports at [facility]:  https://account.Gamerius/services/184#aboutthisservice     From their website: Adaptive sports offer barrier-free opportunities for you to explore new skills or lorenzo your competitive edge in a new sport or one you already enjoy.     Adaptive Recreation in [address]  https://www.[facility]/departments/concepcion-and-recreation/recreation-centers/rmmex-cvakdcuhxu-sshdykvt/adaptive-recreation      At Home Exercise Resources  Rupture Library - Exercises by Muscle Group  https://www.[employer].org/resources/everyone/exercise-library/    Channels with Exercise Modifications:  Coach Dai (strengthening) https://www.Eyeotaube.com/@CoaAncelmo  HASfit (strengthening): https://www.Eyeotaube.com/channel/WBUBS1-OFAKr56FF-h6QOipH  Yoga with Zelinda: https://www.Eyeotaube.com/@yogawithzelinda  Fpr5Xauk (strengthening for any age, functional strength training, exercise for seniors): https://www.Eyeotaube.com/@thq5uevl  Improved Health (low-impact, chair exercise, exercise for seniors): https://www.youLabochemaube.com/@ImprovedHealth  Seated core exercise: https://www.[employer]/blog/10-minute-chair-core-workout/    Size-Inclusive Fitness Routines:   Beginner Workout - Standing (low impact)  https://www.youLabochemaube.com/watch?v=5PtJOBj3OBT   "    Yoga  https://www.Netsizeube.com/watch?v=VdIX8auOH_M&t=36s  https://www.Netsizeube.com/watch?v=zUnjJdJitPw    Pilates  https://www.Netsizeube.com/watch?v=vPNlzrZD6Uk    Full Body Strengthening:  https://www.Netsizeube.com/watch?v=0IoJU1t-ye8   https://www.Netsizeube.com/watch?v=G7c36lWhiiO    Other Fitness Channels:  Dance Workouts: [employer]   https://www.Kngroo.Autosprite/user/TheSamantha    HIIT Workouts: [employer]  https://www.Kngroo.Autosprite/user/Threesixty Campustvfit    Pilates: Blogilates  https://www.Kngroo.Autosprite/user/blogilates    Yoga: Yoga with Miesha   https://www.Kngroo.Autosprite/user/yogawithadriene/featured    Apps:  FitOn - free chon with various exercise videos, instructional videos, challenges and exercise tracking.   Caliber - free and premium options. Track strengthening progress (weights, reps, sets).  Chon also gives instructional videos and recommendations for improving progress.       Handouts Relating to Exercise :    1.     Learning About Being Physically Active     2.      Muscle Conditionin Exercises    3.     Resistance Training with Free Weights: 3 Exercises    4.      Resistance Training with Surgical Tubin Exercises    5.     Stretchin Exercises    6.     Seated Exercises for Arms and Legs: 11 Exercises      Protein:    Quick/Easy Protein Sources:  Hard boiled eggs  Part-skim cheese sticks  Baby Bell cheese rounds  Low-fat/low-sugar Greek yogurt  Low-fat cottage cheese  Lean deli meat (chicken/turkey/ham)  Roasted chickpeas or lentils  Nuts   Turkey meat stick  Protein shake/bar  \"P3\" snack (cheese, nuts, deli meat)  Aldi's \"Protein Bread\"   \"Egglife\" egg white wrap    Tuna/salmon can/packet     Protein Supplements:   Unflavored protein powder: Genepro, Isopure (25-30 gm protein per 1 scoop); Vital Proteins collagen powder (1 tbsp = 5 gm pro)  You may also use flavored protein powder if you prefer.   Protein shots: https://store.CyberArts.Autosprite/collections/protein-shots  Pre-made " protein shakes (no more than 210 Calories, at least 20 grams of protein, and less than 10 grams of sugar):   Premier Protein (160 Calories, 30 g protein)  Boost/Ensure Max (160 calories, 30 gm protein)   Fairlife Core Power (170 calories, 26 gm protein)  Aldi's Elevation Protein Shake (160 calories, 30 gm protein)   Equate Protein Shake (160 calories, 30 gm protein)  Slim Fast Advanced Nutrition (180 Calories, 20 g protein)  Muscle Milk, lactose-free, 17 oz bottle (210 Calories, 30 g protein)  Glucerna Protein Smart (150 megan, 30 gm protein)  Clear Protein Drinks:  BiPro  Premier Protein clear  Yetvlnh3H  M Health Protein 15 Concentrate  Bone broth  Beneprotein protein powder mixed with 4-6 oz of fluid  [id number]  Gatorade Zero with Protein   Vital Proteins collagen powder mixed with clear fluid    Meal Replacement Shake Options:   *Protein Shake Criteria: no more than 210 Calories, at least 20 grams of protein, and less than 10 grams of sugar   Premier Protein (160 Calories, 30 g protein)  Slim Fast Advanced Nutrition (180 Calories, 20 g protein)  Muscle Milk, lactose-free, 17 oz bottle (210 Calories, 30 g protein)  Integrated Supplements, no artificial sugars (110 Calories, 20 g protein)  Boost/Ensure Max (160 calories, 30 gm protein)   Fairlife Protein Shakes (160-230 calories, 26-42 gm protein)  Aldi's Elevation Protein Powder (180 calories, 30 gm protein)   Orgain Protein Shakes (130-160 calories, 20-26 gm protein)     Meal Replacement Bar Options:  Quest Protein Bars (190 Calories, 20 g protein)  Built Bar (170 Calories, 15-20 g protein)  One Protein Bar (210 calories, 20 g protein)  Tk Signature Protein Bar (Costco) (190 Calories, 21 g protein)  Pure Protein Bars (180 Calories, 21 g protein)    Low Calorie Frozen Meal:  Healthy Choice Power Bowls  Lean Cuisine  Smart Ones  Marshall Kimble    ---------------------------------------------------------------  Tips to Increase the Protein in Your  Diet  You may need more protein in your diet to help you heal from an illness, surgery or wound. Extra protein can also help you gain weight. Here are some ideas for adding high-protein foods to your meals.  Meat and fish  Add chopped cooked meat to vegetables, salads, casseroles, soups, sauces and biscuit dough.  Use in omelets, soufflés, quiches, sandwich fillings and chicken or turkey stuffing.  Wrap in pie crust or biscuit dough to make a turnover.  Add to stuffed baked potatoes.  Make a dip with diced meat or flaked fish mixed with sour cream and spices.  Chopped, hard-cooked eggs  Add to salads.  Use for snacks and sandwich filling.  High-protein milk  To make high-protein milk, mix 1 quart whole milk with 1 cup powdered milk.  Add to cream soups, mashed potatoes, scrambled eggs, cereals and dried eggnog mix.  Use as an ingredient in puddings, custards, hot chocolate, milk shakes and pancakes.  Powdered milk  If you don't have any high-protein milk on hand, you can use powdered milk. Add 3 tablespoons to:  gravies, sauces, cream soups, mayonnaise  casseroles, meat patties, meatloaf, tuna salad, deviled ham  scalloped or mashed potatoes, creamed spinach  scrambled eggs, egg salad  cereals  yogurt, milk drinks, ice cream, frozen desserts, puddings, custards.  Add 4 to 6 tablespoons powdered milk to make:  cream sauces  breads, muffins, pancakes, waffles, cookies, cakes  cream pies, frostings, cake fillings  fruit cobblers, bread or rice pudding, gelatin desserts.  For high-protein eggnog, add 3 to 6 tablespoons powdered milk to prepared eggnog.  Hard or soft cheese  Melt on sandwiches, breads, tortillas, hamburgers, hot dogs, other meats, vegetables, eggs and pies.  Grate into soups, chili, sauces, casseroles, vegetables, potatoes, rice, noodles or meatloaf.  Eat with toast or crackers, or melt for mary dip.  Cottage cheese or ricotta cheese  Mix with or scoop on top of fruits and vegetables.  Add to  casseroles, lasagna, spaghetti, noodles and egg dishes (omelets, scrambled eggs, soufflés).  Use in gelatin, pudding-type desserts, cheesecake and pancake batter.  Use to stuff crepes, pasta shells or manicotti.  Fruit yogurt  Blend with fruits for a fruit smoothie.  Use as a dip for fruits and vegetables.  Scoop on top of pancakes or waffles.  Tofu  Blend silken tofu with fruits and juices for a smoothie.  Add chunks of firm tofu to soups and stews, or crumble into meatloaf.  Blend dried onion soup mix into soft or silken tofu for dip.  Use pureed silken tofu for part of the mayonnaise, sour cream, cream cheese or ricotta cheese called for in recipes.  Beans  Use cooked beans or peas in soups, casseroles, pasta, tacos and burritos.  Nuts and seeds  Note: For children under 3, discuss with the child's care team.  Use in casseroles, breads, muffins, pancakes, cookies and waffles.  Sprinkle on fruits, cereals, ice cream, yogurt, vegetables and salads.  Mix with raisins, dried fruits and chocolate chips for a snack.  Nut butters  Note: For children under 3, discuss with the child's care team.  Spread on sandwiches, toast, muffins, crackers, waffles, pancakes and fruit slices.  Use as a dip for raw vegetables.  Blend with milk drinks, or swirl through ice cream, yogurt or hot cereal.  Nutrition supplements (nutrition bars, drinks and powders)  Add powders to milk drinks and desserts.  Mix with ice cream, milk and fruit for a high-protein milk shake.    For informational purposes only. Not to replace the advice of your health care provider. Clinically reviewed by Kathleen Bledsoe RD, JUNG, and the Clinical Nutrition Service Line. Copyright   2005 [employer] [facility]. All rights reserved. [employer] 856303 - REV 04/24.  -----------------------------------------------------------------------------------------------------------------  Learning About High-Protein Foods  What foods are high in protein?     The foods  "you eat contain nutrients, such as vitamins and minerals. Protein is a nutrient. Your body needs the right amount to stay healthy and work as it should. You can use the list below to help you make choices about which foods to eat.  Here are some examples of foods that are high in protein.  Dairy and dairy alternatives  Cheese  Milk  Soy milk  Yogurt (especially Greek)  Meat  Beef  Chicken  Ham  Lamb  Lunch meat  Pork  Sausage  Turkey  Other protein foods  Beans (black, garbanzo, kidney, lima)  Eggs  Hummus  Lentils  Nuts  Peanut butter and other nut butters  Peas  Soybeans  Tofu  Veggie or soy karin (Check the nutrition label for the amount of protein in each serving.)  Seafood  Anchovies  Cod  Crab  Halibut  Mcdonough  Sardines  Shrimp  [place]  [place]  Tuna  Protein supplements  Bars (Check the nutrition label for the amount of protein in each serving.)  Drinks  Powders  Work with your doctor to find out how much of this nutrient you need. Depending on your health, you may need more or less of it in your diet.  Where can you learn more?  Go to https://www.Chaologix.net/patiented  Enter P335 in the search box to learn more about \"Learning About High-Protein Foods.\"  Current as of: September 20, 2023               Content Version: 14.0    [id number] [place].   Care instructions adapted under license by your healthcare professional. If you have questions about a medical condition or this instruction, always ask your healthcare professional. Healthwise, [employer] disclaims any warranty or liability for your use of this information.   "

## 2025-03-12 NOTE — NURSING NOTE
Current patient location:  [place], [place]    Is the patient currently in the state of [place]? YES    Visit mode:TELEPHONE    If the visit is dropped, the patient can be reconnected by: TELEPHONE VISIT: Phone number: [phone]    Will anyone else be joining the visit? NO  (If patient encounters technical issues they should call [phone])    Are changes needed to the allergy or medication list? Pt stated no changes to allergies and Pt stated no med changes    Are refills needed on medications prescribed by this physician? NO    Reason for visit: Medication Therapy Management    Princess Garciae VVF

## 2025-03-12 NOTE — TELEPHONE ENCOUNTER
PA Initiation    Medication: ZEPBOUND 2.5 MG/0.5ML SC SOAJ  Insurance Company: [employer] - Phone [phone] Fax [phone]  Pharmacy Filling the Rx:    Filling Pharmacy Phone:    Filling Pharmacy Fax:    Start Date: 3/12/2025    Key: [id number]

## 2025-03-13 NOTE — TELEPHONE ENCOUNTER
Prior Authorization Approval    Medication: ZEPBOUND 2.5 MG/0.5ML SC SOAJ  Authorization Effective Date: 3/12/2025  Authorization Expiration Date: 3/12/2026  Approved Dose/Quantity: uud  Reference #: Key: [id number]   Insurance Company: [employer] - Phone [phone] Fax [phone]  Expected CoPay: $    CoPay Card Available:      Financial Assistance Needed:    Which Pharmacy is filling the prescription:    Pharmacy Notified: Yes

## 2025-03-17 RX ORDER — VIBEGRON 75 MG/1
75 TABLET, FILM COATED ORAL DAILY
Qty: 90 TABLET | Refills: 3 | Status: SHIPPED | OUTPATIENT
Start: 2025-03-17

## 2025-03-17 NOTE — TELEPHONE ENCOUNTER
vibegron (GEMTESA) 75 MG TABS tablet   90 tablet 3 4/22/2024     Last Office Visit : 3-  Future Office visit:  none    Refill decision: Medication unable to be refilled by RN due to: Medication not included in refill protocol policy   and last visit >18 months    Request from pharmacy:  Requested Prescriptions   Pending Prescriptions Disp Refills    vibegron (GEMTESA) 75 MG TABS tablet [Pharmacy Med Name: Gemtesa Oral Tablet 75 MG] 90 tablet      Sig: Take 1 tablet (75 mg) by mouth daily.       There is no refill protocol information for this order

## 2025-04-04 ENCOUNTER — TRANSFERRED RECORDS (OUTPATIENT)
Dept: HEALTH INFORMATION MANAGEMENT | Facility: CLINIC | Age: 55
End: 2025-04-04
Payer: COMMERCIAL

## 2025-05-05 ENCOUNTER — VIRTUAL VISIT (OUTPATIENT)
Dept: PHARMACY | Facility: CLINIC | Age: 55
End: 2025-05-05
Attending: INTERNAL MEDICINE
Payer: COMMERCIAL

## 2025-05-05 VITALS — BODY MASS INDEX: 26.78 KG/M2 | WEIGHT: 171 LBS

## 2025-05-05 DIAGNOSIS — E66.3 OVERWEIGHT (BMI 25.0-29.9): ICD-10-CM

## 2025-05-05 DIAGNOSIS — E66.01 MORBID OBESITY (H): Primary | ICD-10-CM

## 2025-05-05 NOTE — Clinical Note
Transition from Wegovy to Zepbound went well but not feeling as effective at 5 mg dose, will increase to 7.5 mg weekly.  MTM 5/21 to assess if 10 mg dose needed, Dr. Spivey 7/28 - Alison [place], [place]

## 2025-05-05 NOTE — NURSING NOTE
Is the patient currently in the state of [place]? YES    Location: work    Visit mode:TELEPHONE    If the visit is dropped, the patient can be reconnected by: TELEPHONE VISIT: Phone number:   Telephone Information:   Mobile [phone]       Will anyone else be joining the visit? NO  (If patient encounters technical issues they should call [phone])    Are changes needed to the allergy or medication list? No    Are refills needed on medications prescribed by this physician? NO    Reason for visit: Medication Therapy Management      Miryam Mendez, Virtual Visit Facilitator    QNR Status: NA

## 2025-05-05 NOTE — PROGRESS NOTES
Medication Therapy Management (MTM) Encounter    ASSESSMENT:                            Medication Adherence/Access:   Using co-pay card for Zepbound to bring to $25 co-pay.     Weight Management   Adriana would benefit from increasing therapy with Zepbound to 7.5 mg weekly for weight management given she is tolerating, is yet to be as effective as Wegovy, and hx of obesity.  Recommend maintaining GLP1/GIP therapy as data to support most significant weight loss therapy available. Adriana is mild experiencing benefit from reduction in food noise, cravings, and appetite, and increased satiety.  Education provided on continued dietary and behavioral modifications with focus on protein and water intake along with strengthening exercises to optimize effectiveness of Zepbound.     PLAN:                            Provider Plan:     Increase Zepbound to 7.5 mg weekly     Patient Plan:      Order placed for Zepbound 7.5 mg weekly to [facility] ([phone])  - reach out if having intolerable side effects or issues getting your medication     Protein goal:  20-30 grams protein / meal, minimum 60 gm, ideally 90 gm per day   - try to eat within 60 min of waking up for the day, include protein    - start with protein portion of your  meal, then veggie / fiber, then starch    - try to eat slowly to prevent getting overly full with Wegovy therapy    Water goal:  at least 64 oz per day   - Keep up the great work!      Continue to focus on strengthening exercise to maintain muscle or reduce muscle loss with weight loss    Follow-up:  - Next MTM 5/21/25 at 9 am Alison Arzola, PharmD      - Next provider visit:  7/28/25 Dr. Spivey       SUBJECTIVE/OBJECTIVE:                          Adriana Lucia is a 54 year old female seen for an follow-up  visit. She was referred to me from Dr. Spivey.     Reason for visit: medical weight management     Allergies/ADRs: Reviewed in chart  Past Medical History: Reviewed in  chart  Tobacco: She reports that she has never smoked. She quit smokeless tobacco use about 10 years ago.  Alcohol: not currently using, rare  Caffeine: 1 cup coffee each morning    Medication Adherence/Access: no issues reported.  Prior Authorization Approval     Medication: ZEPBOUND 2.5 MG/0.5ML SC SOAJ  Authorization Effective Date: 3/12/2025  Authorization Expiration Date: 3/12/2026  Approved Dose/Quantity: uud  Reference #: Key: [id number]   Insurance Company: [employer]             - Zepbound co-pay $25, with co-pay card (co-pay is $30 from insurance)      Weight Management    - med start:  11/2023, dose start: 11/2024    - transition from Saxenda (liraglutide)   - Zepbound 5 mg weekly on Thursday     - med start:  3/2025, dose start: 4/2025, wt at start: 170 lb (estimate)    - out of pens, need dose for Thursday     No injection site reaction, very slight nausea, no vomiting, no constipation (baseline: 1 BM / day, now at baseline no straining), no diarrhea, no heartburn, no headache, and no worsening fatigue.     Not feeling as effective as Wegovy was with food noise / cravings.  Experiencing appetite suppression / portion reduction (some, a little bit of this), and reduction with food noise / cravings (helps but not as well as Wegovy 2.4 mg).  Maybe feeling more energy.     Does  feel slight wearing off of medication effectiveness 2 days prior to next dose due.     Nutrition/Eating Habits:    - 05/05/25 Changes from last visit: still working on being mindful of portions and reducing junk food    - 3/12/25 Meals:  portions were biggest issue not food selection, was eating large amounts,    - Breakfast:  cottage cheese, granola, yogurt    - Lunch:  apple/orange, half sandwich   - Dinner:  lean meat, veggie, salad   - Snacks: minimal, nuts/dried fruit, apple   - Hydration:  20 Oz mug, at least 3 / day at work, one cup at home (80-90 Oz / day)  Sleep:  about 7 hrs / night, rested   Exercise/Activity: can be on  feet at work (sometimes sit half /half), 9 yr old son keeps her busy, walk dog, trying to get back into roller skating   - 05/05/25 got a bike, plans to spend more time outdoors with activities   Medications Tried/Failed:  Saxenda: side effects, nausea / sweats     - Last provider visit:  1/13/25 Dr. Spivey    - Next provider visit:  7/28/25 Dr. Spivey      Initial Consult Weight: 235 lb (9/12/22)  Adriana Goal weight: 160- 165 lb      Current weight today: 171 lbs 0 oz  Cumulative Weight Loss: -64 lb, - 27.2 % from baseline    Wt Readings from Last 4 Encounters:   05/05/25 171 lb (77.6 kg)   03/12/25 170 lb (77.1 kg)   01/13/25 171 lb (77.6 kg)   10/25/24 174 lb (78.9 kg)     Today's Vitals: Wt 171 lb (77.6 kg)   BMI 26.78 kg/m    ----------------    I spent 22 minutes with this patient today. All changes were made via collaborative practice agreement with Warren Spivey.     A summary of these recommendations was sent via [employer].    Alison Arzola, PharmD    Medication Therapy Management Pharmacist  [facility]      Telemedicine Visit Details  The patient's medications can be safely assessed via a telemedicine encounter.  Type of service:  Telephone visit  Originating Location (pt. Location): Home    Distant Location (provider location):  Off-site  Start Time: 1:05 PM  End Time: 1:27 PM     Medication Therapy Recommendations  Morbid obesity (H)   1 Current Medication: tirzepatide-Weight Management (ZEPBOUND) 5 MG/0.5ML prefilled pen   Current Medication Sig: Inject 0.5 mLs (5 mg) subcutaneously every 7 days. To start after completing 2.5 mg once weekly for 4 weeks.   Rationale: Dose too low - Dosage too low - Effectiveness   Recommendation: Increase Dose - Zepbound 7.5 MG/0.5ML Soaj   Status: Accepted per CPA   Identified Date: 5/5/2025 Completed Date: 5/5/2025

## 2025-05-05 NOTE — PATIENT INSTRUCTIONS
"Recommendations from MTM Pharmacist visit:                                                    MTM (medication therapy management) is a service provided by a clinical pharmacist designed to help you get the most of out of your medicines.  You may be sent a phone or email survey evaluating today's visit.  Please provide feedback you have for the service he received today if you are able.      Order placed for Zepbound 7.5 mg weekly to [facility] ([phone])  - reach out if having intolerable side effects or issues getting your medication     Protein goal:  20-30 grams protein / meal, minimum 60 gm, ideally 90 gm per day   - try to eat within 60 min of waking up for the day, include protein    - start with protein portion of your  meal, then veggie / fiber, then starch    - try to eat slowly to prevent getting overly full with Wegovy therapy    Water goal:  at least 64 oz per day   - Keep up the great work!      Continue to focus on strengthening exercise to maintain muscle or reduce muscle loss with weight loss       Follow-up:  - Next MTM 5/21/25 at 9 am Alison Arzola, PharmD      - Next provider visit:  7/28/25 Dr. Spivey       It was great speaking with you today.  I value your experience and would be very thankful for your time in providing feedback in our clinic survey. In the next few days, you may receive an email or text message from [employer] with a link to a survey related to your  clinical pharmacist.\"     To schedule another MTM appointment, please call the clinic directly ([facility] Phone Number: [phone] (schedules for [facility] and [facility] - providers, dietitians, health coaches) or you may call the MTM scheduling line at [phone] or toll-free at [phone].     My Clinical Pharmacist's contact information:                                                      Please feel free to contact me with any questions or " concerns you have.      Alison Arzola, PharmD    Medication Therapy Management Pharmacist   [facility]

## 2025-05-21 ENCOUNTER — VIRTUAL VISIT (OUTPATIENT)
Dept: PHARMACY | Facility: CLINIC | Age: 55
End: 2025-05-21
Attending: INTERNAL MEDICINE
Payer: COMMERCIAL

## 2025-05-21 VITALS — WEIGHT: 169 LBS | BODY MASS INDEX: 26.53 KG/M2 | HEIGHT: 67 IN

## 2025-05-21 DIAGNOSIS — E66.3 OVERWEIGHT (BMI 25.0-29.9): Primary | ICD-10-CM

## 2025-05-21 ASSESSMENT — PAIN SCALES - GENERAL: PAINLEVEL_OUTOF10: NO PAIN (0)

## 2025-05-21 NOTE — NURSING NOTE
Current patient location: [address] [place] [id number]    Is the patient currently in the state of [place]? YES    Visit mode: TELEPHONE    If the visit is dropped, the patient can be reconnected by:TELEPHONE VISIT: Phone number:   Telephone Information:   Mobile [phone]       Will anyone else be joining the visit? NO  (If patient encounters technical issues they should call [phone]    Are changes needed to the allergy or medication list? No    Are refills needed on medications prescribed by this physician? NO    Rooming Documentation:  Questionnaire(s) not pre-assigned    Reason for visit: Medication Therapy Management    Demetria STEWART

## 2025-05-21 NOTE — PROGRESS NOTES
Medication Therapy Management (MTM) Encounter    ASSESSMENT:                            Medication Adherence/Access:   Using co-pay card for Zepbound to bring to $25 co-pay, prefers 90 day orders.     Weight Management   Adriana would benefit from increasing therapy with Zepbound to 10 mg weekly for weight management given she is tolerating, is yet to be as effective as Wegovy, and hx of obesity.  Recommend maintaining GLP1/GIP therapy as data to support most significant weight loss therapy available. Adriana is mild experiencing benefit from reduction in food noise, cravings, and appetite, and increased satiety.  She is nearing her goal and optimistic 10 mg will achieve goal and allow for maintenance at goal weight.  Education provided on continued dietary and behavioral modifications with focus on protein and water intake along with strengthening exercises to optimize effectiveness of Zepbound.     PLAN:                            Provider Plan:     Increase Zepbound to 10 mg weekly     Patient Plan:      Order placed for Zepbound 10 mg weekly to [facility] ([phone])  - reach out if having intolerable side effects or issues getting your medication   - look into downloading [employer] bonilla to manage your medications    Protein goal:  20-30 grams protein / meal, minimum 60 gm, ideally 90 gm per day   - Keep up the great work!     Water goal:  at least 64 oz per day   - Keep up the great work!      Continue to focus on strengthening exercise to maintain muscle or reduce muscle loss with weight loss    Follow-up:  - Next MTM 10/2/25 9 am Alison Arzola, PharmD      - Next provider visit:  7/28/25 Dr. Spivey       SUBJECTIVE/OBJECTIVE:                          Adriana Lucia is a 54 year old female seen for an follow-up  visit. She was referred to me from Dr. Spivey.     Reason for visit: medical weight management     Allergies/ADRs: Reviewed in chart  Past Medical History: Reviewed in  chart  Tobacco: She reports that she has never smoked. She quit smokeless tobacco use about 10 years ago.  Alcohol: not currently using, rare  Caffeine: 1 cup coffee each morning    Medication Adherence/Access: no issues reported.  Prior Authorization Approval     Medication: ZEPBOUND 2.5 MG/0.5ML SC SOAJ  Authorization Effective Date: 3/12/2025  Authorization Expiration Date: 3/12/2026  Approved Dose/Quantity: uud  Reference #: Key: [id number]   Insurance Company: [employer]             - Zepbound co-pay $25, with co-pay card (co-pay is $30 from insurance)    - prefers 90 days     Weight Management    - med start:  11/2023, dose start: 11/2024    - transition from Saxenda (liraglutide)   - Zepbound 7.5 mg weekly on Thursday     - med start:  3/2025, dose start: 5/2025, wt at start: 170 lb (estimate)    - one pen left      No injection site reaction, no nausea, no vomiting, no constipation (baseline: 1 BM / day, now at baseline no straining, actually improved), no diarrhea, no heartburn, no headache, and no fatigue.     Experiencing mild appetite suppression / portion reduction (did not notice more efficacy from 5 mg dose), and reduction with food noise / cravings (not more effective than 5 mg).  Slightly more energy.  Does not feel she will need to go to higher doses than 10 mg, feels this will be the dose to accomplish goal and maintenance weight.     Does feel slight wearing off of medication effectiveness 1-2 days prior to next dose due.     Nutrition/Eating Habits:    - 05/21/25 Changes from last visit: has protein rich snacks on hand  - 05/05/25 Changes from last visit: still working on being mindful of portions and reducing junk food    - 3/12/25 Meals:  portions were biggest issue not food selection, was eating large amounts,    - Breakfast:  cottage cheese, granola, yogurt    - Lunch:  apple/orange, half sandwich   - Dinner:  lean meat, veggie, salad   - Snacks: minimal, nuts/dried fruit, apple   -  "Hydration:  20 Oz mug, at least 3 / day at work, one cup at home (80-90 Oz / day)  Sleep:  about 7 hrs / night, rested   Exercise/Activity: can be on feet at work (sometimes sit half /half), 9 yr old son keeps her busy, walk dog, trying to get back into roller skating   - 05/05/25 got a bike, plans to spend more time outdoors with activities   Medications Tried/Failed:  Saxenda: side effects, nausea / sweats     - Last provider visit:  1/13/25 Dr. Spivey    - Next provider visit:  7/28/25 Dr. Spivey      Initial Consult Weight: 235 lb (9/12/22)  Adriana Goal weight: 160- 165 lb      Current weight today: 169 lbs 0 oz  Cumulative Weight Loss: - 66 lb, - 28 % from baseline    Wt Readings from Last 4 Encounters:   05/21/25 169 lb (76.7 kg)   05/05/25 171 lb (77.6 kg)   03/12/25 170 lb (77.1 kg)   01/13/25 171 lb (77.6 kg)     Today's Vitals: Ht 5' 7\" (1.702 m)   Wt 169 lb (76.7 kg)   BMI 26.47 kg/m    ----------------    I spent 13 minutes with this patient today. All changes were made via collaborative practice agreement with Warren Spivey.     A summary of these recommendations was sent via [employer].    Alison Arzola, PharmD    Medication Therapy Management Pharmacist  [facility]      Telemedicine Visit Details  The patient's medications can be safely assessed via a telemedicine encounter.  Type of service:  Telephone visit  Originating Location (pt. Location): Home    Distant Location (provider location):  Off-site  Start Time: 9:02 AM  End Time: 9:15 AM     Medication Therapy Recommendations  Overweight (BMI 25.0-29.9)   1 Current Medication: tirzepatide-Weight Management (ZEPBOUND) 7.5 MG/0.5ML prefilled pen   Current Medication Sig: Inject 0.5 mLs (7.5 mg) subcutaneously every 7 days.   Rationale: Dose too low - Dosage too low - Effectiveness   Recommendation: Increase Dose - Zepbound 10 MG/0.5ML Soaj   Status: Accepted per CPA   Identified Date: 5/21/2025 Completed " Date: 5/21/2025

## 2025-05-21 NOTE — Clinical Note
Doing well on Zepbound but yet to reach Wegovy equivalent effective dose, will increase to 10 mg, optimistic this will allow to reach and maintenance goal weight. Dr. Spivey 7/28, MTM 10/2 - Alison Dowling, MTM

## 2025-05-21 NOTE — PATIENT INSTRUCTIONS
"Recommendations from MTM Pharmacist visit:                                                    MTM (medication therapy management) is a service provided by a clinical pharmacist designed to help you get the most of out of your medicines.  You may be sent a phone or email survey evaluating today's visit.  Please provide feedback you have for the service he received today if you are able.      Order placed for Zepbound 10 mg weekly to [facility] ([phone])  - reach out if having intolerable side effects or issues getting your medication   - look into downloading [employer] bonilla to manage your medications    Protein goal:  20-30 grams protein / meal, minimum 60 gm, ideally 90 gm per day   - Keep up the great work!     Water goal:  at least 64 oz per day   - Keep up the great work!      Continue to focus on strengthening exercise to maintain muscle or reduce muscle loss with weight loss    Follow-up:  - Next MTM 10/2/25 9 am Alison Arzola, Nitesh      - Next provider visit:  7/28/25 Dr. Spivey       It was great speaking with you today.  I value your experience and would be very thankful for your time in providing feedback in our clinic survey. In the next few days, you may receive an email or text message from [facility] [employer] with a link to a survey related to your  clinical pharmacist.\"     To schedule another MTM appointment, please call the clinic directly ([facility] Phone Number: [phone] (schedules for [facility] and [facility] - providers, dietitians, health coaches) or you may call the MTM scheduling line at [phone] or toll-free at [phone].     My Clinical Pharmacist's contact information:                                                      Please feel free to contact me with any questions or concerns you have.      Alison Arzola, PharmD    Medication Therapy Management Pharmacist   [facility]

## 2025-06-15 ENCOUNTER — HEALTH MAINTENANCE LETTER (OUTPATIENT)
Age: 55
End: 2025-06-15

## (undated) DEVICE — SUCTION MANIFOLD NEPTUNE 2 SYS 1 PORT 702-025-000

## (undated) DEVICE — SU MONOCRYL 4-0 PS-2 27" UND Y426H

## (undated) DEVICE — Device

## (undated) DEVICE — ADH SKIN CLOSURE PREMIERPRO EXOFIN 1.0ML 3470

## (undated) DEVICE — PACK MINOR CUSTOM ASC

## (undated) DEVICE — GLOVE RADIATION RESISTANT SZ 7.5  95-395

## (undated) DEVICE — GLOVE BIOGEL PI MICRO SZ 7.5 48575

## (undated) DEVICE — KIT ENDO TURNOVER/PROCEDURE CARRY-ON 101822

## (undated) DEVICE — DRAPE IOBAN INCISE 23X17" 6650EZ

## (undated) DEVICE — TAPE MEDIPORE 4"X2YD 2864

## (undated) DEVICE — SUCTION TIP YANKAUER STR K87

## (undated) DEVICE — DRSG PRIMAPORE 02X3" 7133

## (undated) DEVICE — SPECIMEN CONTAINER 3OZ W/FORMALIN 59901

## (undated) DEVICE — DRSG GAUZE 4X4" 3033

## (undated) DEVICE — LINEN ORTHO PACK 5446

## (undated) DEVICE — SU PROLENE 2-0 SHDA 36" 8523H

## (undated) DEVICE — PROGRAMMER MEDT SMART HANDSET W/COMMUNICATOR TH90G01

## (undated) DEVICE — PREP CHLORAPREP 26ML TINTED ORANGE  260815

## (undated) DEVICE — GOWN IMPERVIOUS 2XL BLUE

## (undated) DEVICE — SOL NACL 0.9% IRRIG 500ML BOTTLE 2F7123

## (undated) DEVICE — SOL WATER IRRIG 1000ML BOTTLE 2F7114

## (undated) DEVICE — BLADE KNIFE SURG 10 371110

## (undated) DEVICE — DRAPE LAP W/ARMBOARD 29410

## (undated) DEVICE — TUBING SUCTION 12"X1/4" N612

## (undated) DEVICE — ENDO FORCEP SPIKED SERRATED SHAFT JUMBO 239CM G56998

## (undated) DEVICE — BASIN EMESIS STERILE  SSK9005A

## (undated) DEVICE — LINEN TOWEL PACK X5 5464

## (undated) DEVICE — SOL WATER IRRIG 500ML BOTTLE 2F7113

## (undated) DEVICE — SYR EAR BULB 3OZ 0035830

## (undated) DEVICE — DRSG TELFA 3X8" 1238

## (undated) DEVICE — RECHARGER BELT

## (undated) DEVICE — SU VICRYL 2-0 CT-2 27" UND J269H

## (undated) DEVICE — GLOVE PROTEXIS MICRO 7.5  2D73PM75

## (undated) DEVICE — STIMULATOR EXTERNAL VERIFY MEDTRONIC INTERSTIM 353101

## (undated) DEVICE — ESU GROUND PAD ADULT W/CORD E7507

## (undated) DEVICE — SU VICRYL 2-0 SH 27" UND J417H

## (undated) DEVICE — SU VICRYL 0 CT-2 27" J334H

## (undated) DEVICE — DRSG STERI STRIP 1/2X4" R1547

## (undated) DEVICE — DRAPE C-ARM W/STRAPS 42X72" 07-CA104

## (undated) RX ORDER — ONDANSETRON 2 MG/ML
INJECTION INTRAMUSCULAR; INTRAVENOUS
Status: DISPENSED
Start: 2021-03-01

## (undated) RX ORDER — CEFAZOLIN SODIUM 1 G/50ML
SOLUTION INTRAVENOUS
Status: DISPENSED
Start: 2021-03-01

## (undated) RX ORDER — PROPOFOL 10 MG/ML
INJECTION, EMULSION INTRAVENOUS
Status: DISPENSED
Start: 2023-07-03

## (undated) RX ORDER — PROPOFOL 10 MG/ML
INJECTION, EMULSION INTRAVENOUS
Status: DISPENSED
Start: 2021-03-01

## (undated) RX ORDER — FENTANYL CITRATE 50 UG/ML
INJECTION, SOLUTION INTRAMUSCULAR; INTRAVENOUS
Status: DISPENSED
Start: 2021-03-01

## (undated) RX ORDER — HYDROMORPHONE HYDROCHLORIDE 1 MG/ML
INJECTION, SOLUTION INTRAMUSCULAR; INTRAVENOUS; SUBCUTANEOUS
Status: DISPENSED
Start: 2023-07-03

## (undated) RX ORDER — BUPIVACAINE HYDROCHLORIDE 2.5 MG/ML
INJECTION, SOLUTION EPIDURAL; INFILTRATION; INTRACAUDAL
Status: DISPENSED
Start: 2021-03-15

## (undated) RX ORDER — BUPIVACAINE HYDROCHLORIDE 2.5 MG/ML
INJECTION, SOLUTION INFILTRATION; PERINEURAL
Status: DISPENSED
Start: 2021-03-01

## (undated) RX ORDER — BUPIVACAINE HYDROCHLORIDE 2.5 MG/ML
INJECTION, SOLUTION EPIDURAL; INFILTRATION; INTRACAUDAL
Status: DISPENSED
Start: 2023-07-03

## (undated) RX ORDER — CEFAZOLIN SODIUM 1 G/3ML
INJECTION, POWDER, FOR SOLUTION INTRAMUSCULAR; INTRAVENOUS
Status: DISPENSED
Start: 2021-03-15

## (undated) RX ORDER — CEFAZOLIN SODIUM 1 G/3ML
INJECTION, POWDER, FOR SOLUTION INTRAMUSCULAR; INTRAVENOUS
Status: DISPENSED
Start: 2021-03-01

## (undated) RX ORDER — ONDANSETRON 2 MG/ML
INJECTION INTRAMUSCULAR; INTRAVENOUS
Status: DISPENSED
Start: 2023-07-03

## (undated) RX ORDER — BETAMETHASONE SODIUM PHOSPHATE AND BETAMETHASONE ACETATE 3; 3 MG/ML; MG/ML
INJECTION, SUSPENSION INTRA-ARTICULAR; INTRALESIONAL; INTRAMUSCULAR; SOFT TISSUE
Status: DISPENSED
Start: 2021-03-01

## (undated) RX ORDER — GABAPENTIN 300 MG/1
CAPSULE ORAL
Status: DISPENSED
Start: 2021-03-01

## (undated) RX ORDER — LIDOCAINE HYDROCHLORIDE 20 MG/ML
INJECTION, SOLUTION EPIDURAL; INFILTRATION; INTRACAUDAL; PERINEURAL
Status: DISPENSED
Start: 2021-03-01

## (undated) RX ORDER — OXYCODONE HYDROCHLORIDE 5 MG/1
TABLET ORAL
Status: DISPENSED
Start: 2023-07-03

## (undated) RX ORDER — CEFAZOLIN SODIUM 2 G/50ML
SOLUTION INTRAVENOUS
Status: DISPENSED
Start: 2023-07-03

## (undated) RX ORDER — ACETAMINOPHEN 325 MG/1
TABLET ORAL
Status: DISPENSED
Start: 2023-07-03

## (undated) RX ORDER — ACETAMINOPHEN 325 MG/1
TABLET ORAL
Status: DISPENSED
Start: 2021-03-01